# Patient Record
Sex: MALE | Race: WHITE | NOT HISPANIC OR LATINO | Employment: OTHER | URBAN - METROPOLITAN AREA
[De-identification: names, ages, dates, MRNs, and addresses within clinical notes are randomized per-mention and may not be internally consistent; named-entity substitution may affect disease eponyms.]

---

## 2017-01-03 ENCOUNTER — COMMUNICATION - HEALTHEAST (OUTPATIENT)
Dept: FAMILY MEDICINE | Facility: CLINIC | Age: 77
End: 2017-01-03

## 2017-01-11 ENCOUNTER — OFFICE VISIT - HEALTHEAST (OUTPATIENT)
Dept: FAMILY MEDICINE | Facility: CLINIC | Age: 77
End: 2017-01-11

## 2017-01-11 DIAGNOSIS — F02.818 LEWY BODY DEMENTIA WITH BEHAVIORAL DISTURBANCE (H): ICD-10-CM

## 2017-01-11 DIAGNOSIS — I25.10 CORONARY ARTERY DISEASE: ICD-10-CM

## 2017-01-11 DIAGNOSIS — G31.83 LEWY BODY DEMENTIA WITH BEHAVIORAL DISTURBANCE (H): ICD-10-CM

## 2017-01-11 DIAGNOSIS — M51.379 DEGENERATION OF LUMBAR OR LUMBOSACRAL INTERVERTEBRAL DISC: ICD-10-CM

## 2017-01-11 ASSESSMENT — MIFFLIN-ST. JEOR: SCORE: 1725.5

## 2017-02-07 ENCOUNTER — COMMUNICATION - HEALTHEAST (OUTPATIENT)
Dept: SCHEDULING | Facility: CLINIC | Age: 77
End: 2017-02-07

## 2017-02-07 DIAGNOSIS — M51.379 DEGENERATION OF LUMBAR OR LUMBOSACRAL INTERVERTEBRAL DISC: ICD-10-CM

## 2017-02-08 ENCOUNTER — COMMUNICATION - HEALTHEAST (OUTPATIENT)
Dept: FAMILY MEDICINE | Facility: CLINIC | Age: 77
End: 2017-02-08

## 2017-02-08 DIAGNOSIS — E11.9 TYPE 2 DIABETES MELLITUS (H): ICD-10-CM

## 2017-02-15 ENCOUNTER — COMMUNICATION - HEALTHEAST (OUTPATIENT)
Dept: NEUROLOGY | Facility: CLINIC | Age: 77
End: 2017-02-15

## 2017-02-15 DIAGNOSIS — G31.83 LEWY BODY DEMENTIA WITHOUT BEHAVIORAL DISTURBANCE (H): ICD-10-CM

## 2017-02-15 DIAGNOSIS — F02.80 LEWY BODY DEMENTIA WITHOUT BEHAVIORAL DISTURBANCE (H): ICD-10-CM

## 2017-03-06 ENCOUNTER — RECORDS - HEALTHEAST (OUTPATIENT)
Dept: ADMINISTRATIVE | Facility: OTHER | Age: 77
End: 2017-03-06

## 2017-03-07 ENCOUNTER — COMMUNICATION - HEALTHEAST (OUTPATIENT)
Dept: FAMILY MEDICINE | Facility: CLINIC | Age: 77
End: 2017-03-07

## 2017-03-07 DIAGNOSIS — M51.379 DEGENERATION OF LUMBAR OR LUMBOSACRAL INTERVERTEBRAL DISC: ICD-10-CM

## 2017-03-19 ENCOUNTER — COMMUNICATION - HEALTHEAST (OUTPATIENT)
Dept: FAMILY MEDICINE | Facility: CLINIC | Age: 77
End: 2017-03-19

## 2017-03-21 ENCOUNTER — COMMUNICATION - HEALTHEAST (OUTPATIENT)
Dept: FAMILY MEDICINE | Facility: CLINIC | Age: 77
End: 2017-03-21

## 2017-03-21 DIAGNOSIS — E11.9 DM2 (DIABETES MELLITUS, TYPE 2) (H): ICD-10-CM

## 2017-04-15 ENCOUNTER — COMMUNICATION - HEALTHEAST (OUTPATIENT)
Dept: FAMILY MEDICINE | Facility: CLINIC | Age: 77
End: 2017-04-15

## 2017-04-15 DIAGNOSIS — M79.18 MYOFASCIAL MUSCLE PAIN: ICD-10-CM

## 2017-04-15 DIAGNOSIS — M43.10 SPONDYLOLISTHESIS: ICD-10-CM

## 2017-04-15 DIAGNOSIS — M54.50 LUMBAR SPINE PAIN: ICD-10-CM

## 2017-04-15 DIAGNOSIS — R25.2 CRAMP OF BOTH LOWER EXTREMITIES: ICD-10-CM

## 2017-04-15 DIAGNOSIS — M48.061 FORAMINAL STENOSIS OF LUMBAR REGION: ICD-10-CM

## 2017-04-18 ENCOUNTER — COMMUNICATION - HEALTHEAST (OUTPATIENT)
Dept: FAMILY MEDICINE | Facility: CLINIC | Age: 77
End: 2017-04-18

## 2017-04-18 DIAGNOSIS — M51.379 DEGENERATION OF LUMBAR OR LUMBOSACRAL INTERVERTEBRAL DISC: ICD-10-CM

## 2017-05-15 ENCOUNTER — COMMUNICATION - HEALTHEAST (OUTPATIENT)
Dept: FAMILY MEDICINE | Facility: CLINIC | Age: 77
End: 2017-05-15

## 2017-05-15 DIAGNOSIS — E11.9 DM2 (DIABETES MELLITUS, TYPE 2) (H): ICD-10-CM

## 2017-05-20 ENCOUNTER — COMMUNICATION - HEALTHEAST (OUTPATIENT)
Dept: FAMILY MEDICINE | Facility: CLINIC | Age: 77
End: 2017-05-20

## 2017-05-20 DIAGNOSIS — N40.0 BPH (BENIGN PROSTATIC HYPERPLASIA): ICD-10-CM

## 2017-05-23 ENCOUNTER — COMMUNICATION - HEALTHEAST (OUTPATIENT)
Dept: FAMILY MEDICINE | Facility: CLINIC | Age: 77
End: 2017-05-23

## 2017-05-23 DIAGNOSIS — M51.379 DEGENERATION OF LUMBAR OR LUMBOSACRAL INTERVERTEBRAL DISC: ICD-10-CM

## 2017-05-27 ENCOUNTER — COMMUNICATION - HEALTHEAST (OUTPATIENT)
Dept: FAMILY MEDICINE | Facility: CLINIC | Age: 77
End: 2017-05-27

## 2017-05-27 DIAGNOSIS — E11.9 TYPE 2 DIABETES MELLITUS (H): ICD-10-CM

## 2017-05-31 ENCOUNTER — COMMUNICATION - HEALTHEAST (OUTPATIENT)
Dept: CARDIOLOGY | Facility: CLINIC | Age: 77
End: 2017-05-31

## 2017-05-31 DIAGNOSIS — E78.5 HYPERLIPEMIA: ICD-10-CM

## 2017-06-12 ENCOUNTER — COMMUNICATION - HEALTHEAST (OUTPATIENT)
Dept: NEUROLOGY | Facility: CLINIC | Age: 77
End: 2017-06-12

## 2017-06-12 DIAGNOSIS — F32.A DEPRESSION: ICD-10-CM

## 2017-06-19 ENCOUNTER — OFFICE VISIT - HEALTHEAST (OUTPATIENT)
Dept: FAMILY MEDICINE | Facility: CLINIC | Age: 77
End: 2017-06-19

## 2017-06-19 DIAGNOSIS — F03.90 DEMENTIA (H): ICD-10-CM

## 2017-06-19 DIAGNOSIS — F33.9 MAJOR DEPRESSIVE DISORDER, RECURRENT EPISODE (H): ICD-10-CM

## 2017-06-19 DIAGNOSIS — E11.9 TYPE II DIABETES MELLITUS (H): ICD-10-CM

## 2017-06-19 DIAGNOSIS — I25.10 CORONARY ARTERY DISEASE: ICD-10-CM

## 2017-06-19 DIAGNOSIS — M51.379 DEGENERATION OF LUMBAR OR LUMBOSACRAL INTERVERTEBRAL DISC: ICD-10-CM

## 2017-06-19 DIAGNOSIS — G47.00 INSOMNIA: ICD-10-CM

## 2017-06-19 LAB — HBA1C MFR BLD: 7 % (ref 3.5–6)

## 2017-06-19 ASSESSMENT — MIFFLIN-ST. JEOR: SCORE: 1707.36

## 2017-07-07 ENCOUNTER — COMMUNICATION - HEALTHEAST (OUTPATIENT)
Dept: FAMILY MEDICINE | Facility: CLINIC | Age: 77
End: 2017-07-07

## 2017-07-07 DIAGNOSIS — E11.9 TYPE 2 DIABETES MELLITUS (H): ICD-10-CM

## 2017-07-10 ENCOUNTER — COMMUNICATION - HEALTHEAST (OUTPATIENT)
Dept: CARDIOLOGY | Facility: CLINIC | Age: 77
End: 2017-07-10

## 2017-07-10 DIAGNOSIS — Z95.2 HEART VALVE REPLACED: ICD-10-CM

## 2017-07-17 ENCOUNTER — COMMUNICATION - HEALTHEAST (OUTPATIENT)
Dept: CARDIOLOGY | Facility: CLINIC | Age: 77
End: 2017-07-17

## 2017-07-17 DIAGNOSIS — Z95.2 S/P AVR: ICD-10-CM

## 2017-07-20 ENCOUNTER — COMMUNICATION - HEALTHEAST (OUTPATIENT)
Dept: FAMILY MEDICINE | Facility: CLINIC | Age: 77
End: 2017-07-20

## 2017-07-20 DIAGNOSIS — M51.379 DEGENERATION OF LUMBAR OR LUMBOSACRAL INTERVERTEBRAL DISC: ICD-10-CM

## 2017-07-21 ENCOUNTER — COMMUNICATION - HEALTHEAST (OUTPATIENT)
Dept: FAMILY MEDICINE | Facility: CLINIC | Age: 77
End: 2017-07-21

## 2017-07-21 DIAGNOSIS — E11.9 DM2 (DIABETES MELLITUS, TYPE 2) (H): ICD-10-CM

## 2017-08-24 ENCOUNTER — COMMUNICATION - HEALTHEAST (OUTPATIENT)
Dept: FAMILY MEDICINE | Facility: CLINIC | Age: 77
End: 2017-08-24

## 2017-08-24 DIAGNOSIS — E11.9 TYPE 2 DIABETES MELLITUS (H): ICD-10-CM

## 2017-08-28 ENCOUNTER — COMMUNICATION - HEALTHEAST (OUTPATIENT)
Dept: FAMILY MEDICINE | Facility: CLINIC | Age: 77
End: 2017-08-28

## 2017-08-28 ENCOUNTER — COMMUNICATION - HEALTHEAST (OUTPATIENT)
Dept: CARDIOLOGY | Facility: CLINIC | Age: 77
End: 2017-08-28

## 2017-08-28 DIAGNOSIS — M43.10 SPONDYLOLISTHESIS: ICD-10-CM

## 2017-08-28 DIAGNOSIS — M54.50 LUMBAR SPINE PAIN: ICD-10-CM

## 2017-08-28 DIAGNOSIS — E78.5 HYPERLIPEMIA: ICD-10-CM

## 2017-08-28 DIAGNOSIS — R25.2 CRAMP OF BOTH LOWER EXTREMITIES: ICD-10-CM

## 2017-08-28 DIAGNOSIS — M79.18 MYOFASCIAL MUSCLE PAIN: ICD-10-CM

## 2017-08-28 DIAGNOSIS — M48.061 FORAMINAL STENOSIS OF LUMBAR REGION: ICD-10-CM

## 2017-09-13 ENCOUNTER — COMMUNICATION - HEALTHEAST (OUTPATIENT)
Dept: FAMILY MEDICINE | Facility: CLINIC | Age: 77
End: 2017-09-13

## 2017-09-13 DIAGNOSIS — M51.379 DEGENERATION OF LUMBAR OR LUMBOSACRAL INTERVERTEBRAL DISC: ICD-10-CM

## 2017-09-27 ENCOUNTER — OFFICE VISIT - HEALTHEAST (OUTPATIENT)
Dept: CARDIOLOGY | Facility: CLINIC | Age: 77
End: 2017-09-27

## 2017-09-27 DIAGNOSIS — E78.2 MIXED HYPERLIPIDEMIA: ICD-10-CM

## 2017-09-27 DIAGNOSIS — Z95.2 HEART VALVE REPLACED: ICD-10-CM

## 2017-09-27 ASSESSMENT — MIFFLIN-ST. JEOR: SCORE: 1669.93

## 2017-11-01 ENCOUNTER — OFFICE VISIT - HEALTHEAST (OUTPATIENT)
Dept: FAMILY MEDICINE | Facility: CLINIC | Age: 77
End: 2017-11-01

## 2017-11-01 DIAGNOSIS — E11.9 TYPE II DIABETES MELLITUS (H): ICD-10-CM

## 2017-11-01 DIAGNOSIS — Z00.00 ROUTINE GENERAL MEDICAL EXAMINATION AT A HEALTH CARE FACILITY: ICD-10-CM

## 2017-11-01 DIAGNOSIS — L91.8 SKIN TAG: ICD-10-CM

## 2017-11-01 LAB — HBA1C MFR BLD: 6 % (ref 3.5–6)

## 2017-11-01 ASSESSMENT — MIFFLIN-ST. JEOR: SCORE: 1662

## 2017-12-01 ENCOUNTER — OFFICE VISIT - HEALTHEAST (OUTPATIENT)
Dept: FAMILY MEDICINE | Facility: CLINIC | Age: 77
End: 2017-12-01

## 2017-12-01 DIAGNOSIS — J01.90 SINUSITIS, ACUTE: ICD-10-CM

## 2017-12-09 ENCOUNTER — COMMUNICATION - HEALTHEAST (OUTPATIENT)
Dept: NEUROLOGY | Facility: CLINIC | Age: 77
End: 2017-12-09

## 2017-12-09 DIAGNOSIS — G31.83 LEWY BODY DEMENTIA (H): ICD-10-CM

## 2017-12-09 DIAGNOSIS — F02.80 LEWY BODY DEMENTIA (H): ICD-10-CM

## 2017-12-11 ENCOUNTER — COMMUNICATION - HEALTHEAST (OUTPATIENT)
Dept: NEUROLOGY | Facility: CLINIC | Age: 77
End: 2017-12-11

## 2017-12-11 DIAGNOSIS — F02.80 LEWY BODY DEMENTIA (H): ICD-10-CM

## 2017-12-11 DIAGNOSIS — G31.83 LEWY BODY DEMENTIA (H): ICD-10-CM

## 2017-12-19 ENCOUNTER — COMMUNICATION - HEALTHEAST (OUTPATIENT)
Dept: FAMILY MEDICINE | Facility: CLINIC | Age: 77
End: 2017-12-19

## 2017-12-20 ENCOUNTER — COMMUNICATION - HEALTHEAST (OUTPATIENT)
Dept: FAMILY MEDICINE | Facility: CLINIC | Age: 77
End: 2017-12-20

## 2017-12-20 DIAGNOSIS — F33.9 MAJOR DEPRESSIVE DISORDER, RECURRENT EPISODE (H): ICD-10-CM

## 2017-12-27 ENCOUNTER — AMBULATORY - HEALTHEAST (OUTPATIENT)
Dept: FAMILY MEDICINE | Facility: CLINIC | Age: 77
End: 2017-12-27

## 2017-12-27 DIAGNOSIS — F33.1 MODERATE EPISODE OF RECURRENT MAJOR DEPRESSIVE DISORDER (H): ICD-10-CM

## 2018-01-08 ENCOUNTER — COMMUNICATION - HEALTHEAST (OUTPATIENT)
Dept: NEUROLOGY | Facility: CLINIC | Age: 78
End: 2018-01-08

## 2018-01-08 DIAGNOSIS — G31.83 LEWY BODY DEMENTIA WITHOUT BEHAVIORAL DISTURBANCE (H): ICD-10-CM

## 2018-01-08 DIAGNOSIS — F02.80 LEWY BODY DEMENTIA WITHOUT BEHAVIORAL DISTURBANCE (H): ICD-10-CM

## 2018-01-15 ENCOUNTER — COMMUNICATION - HEALTHEAST (OUTPATIENT)
Dept: FAMILY MEDICINE | Facility: CLINIC | Age: 78
End: 2018-01-15

## 2018-01-15 DIAGNOSIS — M43.10 SPONDYLOLISTHESIS: ICD-10-CM

## 2018-01-15 DIAGNOSIS — M48.061 FORAMINAL STENOSIS OF LUMBAR REGION: ICD-10-CM

## 2018-01-15 DIAGNOSIS — E11.9 DM2 (DIABETES MELLITUS, TYPE 2) (H): ICD-10-CM

## 2018-01-15 DIAGNOSIS — M79.18 MYOFASCIAL MUSCLE PAIN: ICD-10-CM

## 2018-01-15 DIAGNOSIS — M54.50 LUMBAR SPINE PAIN: ICD-10-CM

## 2018-01-15 DIAGNOSIS — R25.2 CRAMP OF BOTH LOWER EXTREMITIES: ICD-10-CM

## 2018-01-30 ENCOUNTER — COMMUNICATION - HEALTHEAST (OUTPATIENT)
Dept: FAMILY MEDICINE | Facility: CLINIC | Age: 78
End: 2018-01-30

## 2018-02-14 ENCOUNTER — COMMUNICATION - HEALTHEAST (OUTPATIENT)
Dept: FAMILY MEDICINE | Facility: CLINIC | Age: 78
End: 2018-02-14

## 2018-02-14 DIAGNOSIS — N40.0 BPH (BENIGN PROSTATIC HYPERPLASIA): ICD-10-CM

## 2018-03-07 ENCOUNTER — COMMUNICATION - HEALTHEAST (OUTPATIENT)
Dept: FAMILY MEDICINE | Facility: CLINIC | Age: 78
End: 2018-03-07

## 2018-03-07 DIAGNOSIS — K21.9 ESOPHAGEAL REFLUX: ICD-10-CM

## 2018-03-13 ENCOUNTER — COMMUNICATION - HEALTHEAST (OUTPATIENT)
Dept: FAMILY MEDICINE | Facility: CLINIC | Age: 78
End: 2018-03-13

## 2018-03-21 ENCOUNTER — COMMUNICATION - HEALTHEAST (OUTPATIENT)
Dept: FAMILY MEDICINE | Facility: CLINIC | Age: 78
End: 2018-03-21

## 2018-03-22 ENCOUNTER — OFFICE VISIT - HEALTHEAST (OUTPATIENT)
Dept: FAMILY MEDICINE | Facility: CLINIC | Age: 78
End: 2018-03-22

## 2018-03-22 DIAGNOSIS — J31.0 RHINITIS: ICD-10-CM

## 2018-03-22 DIAGNOSIS — N40.0 BPH (BENIGN PROSTATIC HYPERPLASIA): ICD-10-CM

## 2018-03-22 ASSESSMENT — MIFFLIN-ST. JEOR: SCORE: 1711.9

## 2018-04-05 ENCOUNTER — OFFICE VISIT - HEALTHEAST (OUTPATIENT)
Dept: FAMILY MEDICINE | Facility: CLINIC | Age: 78
End: 2018-04-05

## 2018-04-05 ENCOUNTER — COMMUNICATION - HEALTHEAST (OUTPATIENT)
Dept: FAMILY MEDICINE | Facility: CLINIC | Age: 78
End: 2018-04-05

## 2018-04-05 DIAGNOSIS — R53.83 FATIGUE: ICD-10-CM

## 2018-04-05 DIAGNOSIS — G47.00 INSOMNIA: ICD-10-CM

## 2018-04-05 DIAGNOSIS — N18.30 CHRONIC KIDNEY DISEASE, STAGE III (MODERATE) (H): ICD-10-CM

## 2018-04-05 DIAGNOSIS — R32 URINARY INCONTINENCE: ICD-10-CM

## 2018-04-05 DIAGNOSIS — R41.3 MEMORY LOSS: ICD-10-CM

## 2018-04-05 DIAGNOSIS — E11.9 TYPE II DIABETES MELLITUS (H): ICD-10-CM

## 2018-04-05 LAB
ALBUMIN SERPL-MCNC: 3.5 G/DL (ref 3.5–5)
ALBUMIN UR-MCNC: NEGATIVE MG/DL
ALP SERPL-CCNC: 54 U/L (ref 45–120)
ALT SERPL W P-5'-P-CCNC: 23 U/L (ref 0–45)
ANION GAP SERPL CALCULATED.3IONS-SCNC: 11 MMOL/L (ref 5–18)
APPEARANCE UR: CLEAR
AST SERPL W P-5'-P-CCNC: 24 U/L (ref 0–40)
BACTERIA #/AREA URNS HPF: ABNORMAL HPF
BILIRUB SERPL-MCNC: 0.5 MG/DL (ref 0–1)
BILIRUB UR QL STRIP: NEGATIVE
BUN SERPL-MCNC: 20 MG/DL (ref 8–28)
CALCIUM SERPL-MCNC: 9.8 MG/DL (ref 8.5–10.5)
CHLORIDE BLD-SCNC: 103 MMOL/L (ref 98–107)
CO2 SERPL-SCNC: 25 MMOL/L (ref 22–31)
COLOR UR AUTO: YELLOW
CREAT SERPL-MCNC: 1.35 MG/DL (ref 0.7–1.3)
ERYTHROCYTE [DISTWIDTH] IN BLOOD BY AUTOMATED COUNT: 11.8 % (ref 11–14.5)
GFR SERPL CREATININE-BSD FRML MDRD: 51 ML/MIN/1.73M2
GLUCOSE BLD-MCNC: 136 MG/DL (ref 70–125)
GLUCOSE UR STRIP-MCNC: NEGATIVE MG/DL
HBA1C MFR BLD: 6.8 % (ref 3.5–6)
HCT VFR BLD AUTO: 48.2 % (ref 40–54)
HGB BLD-MCNC: 16.6 G/DL (ref 14–18)
HGB UR QL STRIP: NEGATIVE
KETONES UR STRIP-MCNC: NEGATIVE MG/DL
LEUKOCYTE ESTERASE UR QL STRIP: NEGATIVE
MCH RBC QN AUTO: 32.9 PG (ref 27–34)
MCHC RBC AUTO-ENTMCNC: 34.4 G/DL (ref 32–36)
MCV RBC AUTO: 96 FL (ref 80–100)
NITRATE UR QL: NEGATIVE
PH UR STRIP: 5 [PH] (ref 5–8)
PLATELET # BLD AUTO: 278 THOU/UL (ref 140–440)
PMV BLD AUTO: 7 FL (ref 7–10)
POTASSIUM BLD-SCNC: 5 MMOL/L (ref 3.5–5)
PROT SERPL-MCNC: 7.1 G/DL (ref 6–8)
RBC # BLD AUTO: 5.03 MILL/UL (ref 4.4–6.2)
RBC #/AREA URNS AUTO: ABNORMAL HPF
SODIUM SERPL-SCNC: 139 MMOL/L (ref 136–145)
SP GR UR STRIP: >=1.03 (ref 1–1.03)
SQUAMOUS #/AREA URNS AUTO: ABNORMAL LPF
UROBILINOGEN UR STRIP-ACNC: ABNORMAL
WBC #/AREA URNS AUTO: ABNORMAL HPF
WBC: 4.9 THOU/UL (ref 4–11)

## 2018-04-05 ASSESSMENT — MIFFLIN-ST. JEOR: SCORE: 1697.16

## 2018-04-16 ENCOUNTER — COMMUNICATION - HEALTHEAST (OUTPATIENT)
Dept: FAMILY MEDICINE | Facility: CLINIC | Age: 78
End: 2018-04-16

## 2018-04-16 DIAGNOSIS — F02.80 LEWY BODY DEMENTIA WITHOUT BEHAVIORAL DISTURBANCE (H): ICD-10-CM

## 2018-04-16 DIAGNOSIS — G31.83 LEWY BODY DEMENTIA WITHOUT BEHAVIORAL DISTURBANCE (H): ICD-10-CM

## 2018-05-09 ENCOUNTER — COMMUNICATION - HEALTHEAST (OUTPATIENT)
Dept: FAMILY MEDICINE | Facility: CLINIC | Age: 78
End: 2018-05-09

## 2018-05-09 DIAGNOSIS — M48.061 FORAMINAL STENOSIS OF LUMBAR REGION: ICD-10-CM

## 2018-05-09 DIAGNOSIS — M54.50 LUMBAR SPINE PAIN: ICD-10-CM

## 2018-05-09 DIAGNOSIS — R25.2 CRAMP OF BOTH LOWER EXTREMITIES: ICD-10-CM

## 2018-05-09 DIAGNOSIS — M79.18 MYOFASCIAL MUSCLE PAIN: ICD-10-CM

## 2018-05-09 DIAGNOSIS — M43.10 SPONDYLOLISTHESIS: ICD-10-CM

## 2018-05-22 ENCOUNTER — COMMUNICATION - HEALTHEAST (OUTPATIENT)
Dept: FAMILY MEDICINE | Facility: CLINIC | Age: 78
End: 2018-05-22

## 2018-06-07 ENCOUNTER — COMMUNICATION - HEALTHEAST (OUTPATIENT)
Dept: FAMILY MEDICINE | Facility: CLINIC | Age: 78
End: 2018-06-07

## 2018-06-07 DIAGNOSIS — K21.9 ESOPHAGEAL REFLUX: ICD-10-CM

## 2018-06-11 ENCOUNTER — OFFICE VISIT - HEALTHEAST (OUTPATIENT)
Dept: FAMILY MEDICINE | Facility: CLINIC | Age: 78
End: 2018-06-11

## 2018-06-11 DIAGNOSIS — M51.379 DEGENERATION OF LUMBAR OR LUMBOSACRAL INTERVERTEBRAL DISC: ICD-10-CM

## 2018-06-11 DIAGNOSIS — M54.41 CHRONIC RIGHT-SIDED LOW BACK PAIN WITH RIGHT-SIDED SCIATICA: ICD-10-CM

## 2018-06-11 DIAGNOSIS — G89.29 CHRONIC RIGHT-SIDED LOW BACK PAIN WITH RIGHT-SIDED SCIATICA: ICD-10-CM

## 2018-06-11 ASSESSMENT — MIFFLIN-ST. JEOR: SCORE: 1707.36

## 2018-06-25 ENCOUNTER — RECORDS - HEALTHEAST (OUTPATIENT)
Dept: ADMINISTRATIVE | Facility: OTHER | Age: 78
End: 2018-06-25

## 2018-07-02 ENCOUNTER — COMMUNICATION - HEALTHEAST (OUTPATIENT)
Dept: FAMILY MEDICINE | Facility: CLINIC | Age: 78
End: 2018-07-02

## 2018-07-02 DIAGNOSIS — G47.00 INSOMNIA: ICD-10-CM

## 2018-07-07 ENCOUNTER — COMMUNICATION - HEALTHEAST (OUTPATIENT)
Dept: FAMILY MEDICINE | Facility: CLINIC | Age: 78
End: 2018-07-07

## 2018-07-07 DIAGNOSIS — F33.1 MODERATE EPISODE OF RECURRENT MAJOR DEPRESSIVE DISORDER (H): ICD-10-CM

## 2018-07-11 ENCOUNTER — COMMUNICATION - HEALTHEAST (OUTPATIENT)
Dept: FAMILY MEDICINE | Facility: CLINIC | Age: 78
End: 2018-07-11

## 2018-07-11 ENCOUNTER — COMMUNICATION - HEALTHEAST (OUTPATIENT)
Dept: PHARMACY | Facility: CLINIC | Age: 78
End: 2018-07-11

## 2018-07-16 ENCOUNTER — COMMUNICATION - HEALTHEAST (OUTPATIENT)
Dept: FAMILY MEDICINE | Facility: CLINIC | Age: 78
End: 2018-07-16

## 2018-07-16 ENCOUNTER — OFFICE VISIT - HEALTHEAST (OUTPATIENT)
Dept: FAMILY MEDICINE | Facility: CLINIC | Age: 78
End: 2018-07-16

## 2018-07-16 DIAGNOSIS — M15.0 PRIMARY OSTEOARTHRITIS INVOLVING MULTIPLE JOINTS: ICD-10-CM

## 2018-07-16 DIAGNOSIS — M51.379 DEGENERATION OF LUMBAR OR LUMBOSACRAL INTERVERTEBRAL DISC: ICD-10-CM

## 2018-07-16 DIAGNOSIS — E11.9 DM2 (DIABETES MELLITUS, TYPE 2) (H): ICD-10-CM

## 2018-07-16 LAB
ANION GAP SERPL CALCULATED.3IONS-SCNC: 10 MMOL/L (ref 5–18)
BUN SERPL-MCNC: 32 MG/DL (ref 8–28)
CALCIUM SERPL-MCNC: 10 MG/DL (ref 8.5–10.5)
CHLORIDE BLD-SCNC: 108 MMOL/L (ref 98–107)
CO2 SERPL-SCNC: 24 MMOL/L (ref 22–31)
CREAT SERPL-MCNC: 1.47 MG/DL (ref 0.7–1.3)
GFR SERPL CREATININE-BSD FRML MDRD: 46 ML/MIN/1.73M2
GLUCOSE BLD-MCNC: 99 MG/DL (ref 70–125)
POTASSIUM BLD-SCNC: 4.7 MMOL/L (ref 3.5–5)
SODIUM SERPL-SCNC: 142 MMOL/L (ref 136–145)

## 2018-07-16 ASSESSMENT — MIFFLIN-ST. JEOR: SCORE: 1716.44

## 2018-07-17 ENCOUNTER — AMBULATORY - HEALTHEAST (OUTPATIENT)
Dept: FAMILY MEDICINE | Facility: CLINIC | Age: 78
End: 2018-07-17

## 2018-07-17 ENCOUNTER — COMMUNICATION - HEALTHEAST (OUTPATIENT)
Dept: FAMILY MEDICINE | Facility: CLINIC | Age: 78
End: 2018-07-17

## 2018-08-09 ENCOUNTER — COMMUNICATION - HEALTHEAST (OUTPATIENT)
Dept: FAMILY MEDICINE | Facility: CLINIC | Age: 78
End: 2018-08-09

## 2018-08-19 ENCOUNTER — COMMUNICATION - HEALTHEAST (OUTPATIENT)
Dept: FAMILY MEDICINE | Facility: CLINIC | Age: 78
End: 2018-08-19

## 2018-09-06 ENCOUNTER — COMMUNICATION - HEALTHEAST (OUTPATIENT)
Dept: FAMILY MEDICINE | Facility: CLINIC | Age: 78
End: 2018-09-06

## 2018-09-06 DIAGNOSIS — E11.9 TYPE 2 DIABETES MELLITUS (H): ICD-10-CM

## 2018-09-11 ENCOUNTER — COMMUNICATION - HEALTHEAST (OUTPATIENT)
Dept: FAMILY MEDICINE | Facility: CLINIC | Age: 78
End: 2018-09-11

## 2018-09-18 ENCOUNTER — COMMUNICATION - HEALTHEAST (OUTPATIENT)
Dept: FAMILY MEDICINE | Facility: CLINIC | Age: 78
End: 2018-09-18

## 2018-09-18 DIAGNOSIS — M43.10 SPONDYLOLISTHESIS: ICD-10-CM

## 2018-09-18 DIAGNOSIS — M54.50 LUMBAR SPINE PAIN: ICD-10-CM

## 2018-09-18 DIAGNOSIS — M48.061 FORAMINAL STENOSIS OF LUMBAR REGION: ICD-10-CM

## 2018-09-18 DIAGNOSIS — M79.18 MYOFASCIAL MUSCLE PAIN: ICD-10-CM

## 2018-09-18 DIAGNOSIS — R25.2 CRAMP OF BOTH LOWER EXTREMITIES: ICD-10-CM

## 2018-09-29 ENCOUNTER — COMMUNICATION - HEALTHEAST (OUTPATIENT)
Dept: CARDIOLOGY | Facility: CLINIC | Age: 78
End: 2018-09-29

## 2018-09-29 DIAGNOSIS — Z95.2 HEART VALVE REPLACED: ICD-10-CM

## 2018-10-01 ENCOUNTER — OFFICE VISIT - HEALTHEAST (OUTPATIENT)
Dept: CARDIOLOGY | Facility: CLINIC | Age: 78
End: 2018-10-01

## 2018-10-01 DIAGNOSIS — Z95.2 S/P AVR: ICD-10-CM

## 2018-10-03 ENCOUNTER — COMMUNICATION - HEALTHEAST (OUTPATIENT)
Dept: FAMILY MEDICINE | Facility: CLINIC | Age: 78
End: 2018-10-03

## 2018-10-03 DIAGNOSIS — R25.2 CRAMP OF BOTH LOWER EXTREMITIES: ICD-10-CM

## 2018-10-03 DIAGNOSIS — M43.10 SPONDYLOLISTHESIS: ICD-10-CM

## 2018-10-03 DIAGNOSIS — M54.50 LUMBAR SPINE PAIN: ICD-10-CM

## 2018-10-03 DIAGNOSIS — M79.18 MYOFASCIAL MUSCLE PAIN: ICD-10-CM

## 2018-10-03 DIAGNOSIS — M48.061 FORAMINAL STENOSIS OF LUMBAR REGION: ICD-10-CM

## 2018-10-08 ENCOUNTER — COMMUNICATION - HEALTHEAST (OUTPATIENT)
Dept: FAMILY MEDICINE | Facility: CLINIC | Age: 78
End: 2018-10-08

## 2018-10-12 ENCOUNTER — HOSPITAL ENCOUNTER (OUTPATIENT)
Dept: CARDIOLOGY | Facility: HOSPITAL | Age: 78
Discharge: HOME | End: 2018-10-12
Attending: INTERNAL MEDICINE

## 2018-10-12 LAB
AORTIC ROOT: 3.4 CM
AORTIC VALVE MEAN VELOCITY: 126 CM/S
AV DIMENSIONLESS INDEX VTI: 0.7
AV MEAN GRADIENT: 8 MMHG
AV PEAK GRADIENT: 15.4 MMHG
AV VALVE AREA: 2.1 CM2
BSA FOR ECHO PROCEDURE: 2.22 M2
CV BLOOD PRESSURE: NORMAL MMHG
CV ECHO HEIGHT: 71 IN
CV ECHO WEIGHT: 218 LBS
DOP CALC AO PEAK VEL: 196 CM/S
DOP CALC AO VTI: 32.2 CM
DOP CALC LVOT AREA: 3.14 CM2
DOP CALC LVOT DIAMETER: 2 CM
DOP CALC LVOT STROKE VOLUME: 67.8 CM3
DOP CALC MV VTI: 28.8 CM
DOP CALCLVOT PEAK VEL VTI: 21.6 CM
EJECTION FRACTION: 64 % (ref 55–75)
FRACTIONAL SHORTENING: 48.8 % (ref 28–44)
INTERVENTRICULAR SEPTUM IN END DIASTOLE: 1.7 CM (ref 0.6–1)
IVS/PW RATIO: 1.3
LA AREA 1: 18.5 CM2
LA AREA 2: 12.6 CM2
LEFT ATRIUM LENGTH: 4.19 CM
LEFT ATRIUM SIZE: 3.6 CM
LEFT ATRIUM VOLUME INDEX: 21.3 ML/M2
LEFT ATRIUM VOLUME: 47.3 ML
LEFT VENTRICLE CARDIAC INDEX: 2 L/MIN/M2
LEFT VENTRICLE CARDIAC OUTPUT: 4.5 L/MIN
LEFT VENTRICLE DIASTOLIC VOLUME INDEX: 48.6 CM3/M2 (ref 34–74)
LEFT VENTRICLE DIASTOLIC VOLUME: 108 CM3 (ref 62–150)
LEFT VENTRICLE HEART RATE: 66 BPM
LEFT VENTRICLE MASS INDEX: 108.6 G/M2
LEFT VENTRICLE SYSTOLIC VOLUME INDEX: 17.6 CM3/M2 (ref 11–31)
LEFT VENTRICLE SYSTOLIC VOLUME: 39 CM3 (ref 21–61)
LEFT VENTRICULAR INTERNAL DIMENSION IN DIASTOLE: 4.1 CM (ref 4.2–5.8)
LEFT VENTRICULAR INTERNAL DIMENSION IN SYSTOLE: 2.1 CM (ref 2.5–4)
LEFT VENTRICULAR MASS: 241 G
LEFT VENTRICULAR OUTFLOW TRACT MEAN GRADIENT: 2 MMHG
LEFT VENTRICULAR OUTFLOW TRACT MEAN VELOCITY: 67.4 CM/S
LEFT VENTRICULAR POSTERIOR WALL IN END DIASTOLE: 1.3 CM (ref 0.6–1)
LV STROKE VOLUME INDEX: 30.6 ML/M2
MITRAL VALVE DECELERATION SLOPE: 4190 MM/S2
MITRAL VALVE E/A RATIO: 0.7
MITRAL VALVE MEAN INFLOW VELOCITY: 76.3 CM/S
MITRAL VALVE PEAK VELOCITY: 117 CM/S
MITRAL VALVE PRESSURE HALF-TIME: 72 MS
MV AREA VTI: 2.36 CM2
MV AVERAGE E/E' RATIO: 12 CM/S
MV DECELERATION TIME: 278 MS
MV E'TISSUE VEL-LAT: 8.58 CM/S
MV E'TISSUE VEL-MED: 5.56 CM/S
MV LATERAL E/E' RATIO: 9.9
MV MEAN GRADIENT: 3 MMHG
MV MEDIAL E/E' RATIO: 15.3
MV PEAK A VELOCITY: 122 CM/S
MV PEAK E VELOCITY: 85 CM/S
MV PEAK GRADIENT: 5.5 MMHG
MV VALVE AREA BY CONTINUITY EQUATION: 2.4 CM2
MV VALVE AREA PRESSURE 1/2 METHOD: 3.1 CM2
NUC REST DIASTOLIC VOLUME INDEX: 3488 LBS
NUC REST SYSTOLIC VOLUME INDEX: 71 IN
PR MAX PG: 2 MMHG
PR PEAK VELOCITY: 70.4 CM/S

## 2018-10-12 ASSESSMENT — MIFFLIN-ST. JEOR: SCORE: 1720.97

## 2018-10-16 ENCOUNTER — OFFICE VISIT - HEALTHEAST (OUTPATIENT)
Dept: FAMILY MEDICINE | Facility: CLINIC | Age: 78
End: 2018-10-16

## 2018-10-16 DIAGNOSIS — N40.1 BENIGN PROSTATIC HYPERPLASIA WITH POST-VOID DRIBBLING: ICD-10-CM

## 2018-10-16 DIAGNOSIS — F03.90 DEMENTIA (H): ICD-10-CM

## 2018-10-16 DIAGNOSIS — N39.43 BENIGN PROSTATIC HYPERPLASIA WITH POST-VOID DRIBBLING: ICD-10-CM

## 2018-10-16 LAB
ALBUMIN UR-MCNC: NEGATIVE MG/DL
APPEARANCE UR: CLEAR
BACTERIA #/AREA URNS HPF: ABNORMAL HPF
BILIRUB UR QL STRIP: NEGATIVE
CAOX CRY #/AREA URNS HPF: PRESENT /[HPF]
COLOR UR AUTO: YELLOW
GLUCOSE UR STRIP-MCNC: NEGATIVE MG/DL
HGB UR QL STRIP: NEGATIVE
KETONES UR STRIP-MCNC: ABNORMAL MG/DL
LEUKOCYTE ESTERASE UR QL STRIP: ABNORMAL
NITRATE UR QL: NEGATIVE
PH UR STRIP: 5 [PH] (ref 5–8)
RBC #/AREA URNS AUTO: ABNORMAL HPF
SP GR UR STRIP: >=1.03 (ref 1–1.03)
SPERM #/AREA URNS HPF: PRESENT /[HPF]
SQUAMOUS #/AREA URNS AUTO: ABNORMAL LPF
UROBILINOGEN UR STRIP-ACNC: ABNORMAL
WBC #/AREA URNS AUTO: ABNORMAL HPF

## 2018-10-16 ASSESSMENT — MIFFLIN-ST. JEOR: SCORE: 1716.44

## 2018-10-17 LAB — BACTERIA SPEC CULT: NO GROWTH

## 2018-10-27 ENCOUNTER — COMMUNICATION - HEALTHEAST (OUTPATIENT)
Dept: FAMILY MEDICINE | Facility: CLINIC | Age: 78
End: 2018-10-27

## 2018-10-27 DIAGNOSIS — G31.83 LEWY BODY DEMENTIA WITHOUT BEHAVIORAL DISTURBANCE (H): ICD-10-CM

## 2018-10-27 DIAGNOSIS — F02.80 LEWY BODY DEMENTIA WITHOUT BEHAVIORAL DISTURBANCE (H): ICD-10-CM

## 2018-11-09 ENCOUNTER — COMMUNICATION - HEALTHEAST (OUTPATIENT)
Dept: FAMILY MEDICINE | Facility: CLINIC | Age: 78
End: 2018-11-09

## 2018-11-10 ENCOUNTER — COMMUNICATION - HEALTHEAST (OUTPATIENT)
Dept: NEUROLOGY | Facility: CLINIC | Age: 78
End: 2018-11-10

## 2018-11-10 ENCOUNTER — COMMUNICATION - HEALTHEAST (OUTPATIENT)
Dept: FAMILY MEDICINE | Facility: CLINIC | Age: 78
End: 2018-11-10

## 2018-11-10 DIAGNOSIS — F02.80 LEWY BODY DEMENTIA (H): ICD-10-CM

## 2018-11-10 DIAGNOSIS — G31.83 LEWY BODY DEMENTIA (H): ICD-10-CM

## 2018-12-10 ENCOUNTER — COMMUNICATION - HEALTHEAST (OUTPATIENT)
Dept: FAMILY MEDICINE | Facility: CLINIC | Age: 78
End: 2018-12-10

## 2019-01-09 ENCOUNTER — COMMUNICATION - HEALTHEAST (OUTPATIENT)
Dept: NEUROLOGY | Facility: CLINIC | Age: 79
End: 2019-01-09

## 2019-01-09 DIAGNOSIS — G31.83 LEWY BODY DEMENTIA (H): ICD-10-CM

## 2019-01-09 DIAGNOSIS — F02.80 LEWY BODY DEMENTIA (H): ICD-10-CM

## 2019-01-16 ENCOUNTER — COMMUNICATION - HEALTHEAST (OUTPATIENT)
Dept: FAMILY MEDICINE | Facility: CLINIC | Age: 79
End: 2019-01-16

## 2019-01-16 DIAGNOSIS — R25.2 CRAMP OF BOTH LOWER EXTREMITIES: ICD-10-CM

## 2019-01-16 DIAGNOSIS — M54.50 LUMBAR SPINE PAIN: ICD-10-CM

## 2019-01-16 DIAGNOSIS — M48.061 FORAMINAL STENOSIS OF LUMBAR REGION: ICD-10-CM

## 2019-01-16 DIAGNOSIS — M79.18 MYOFASCIAL MUSCLE PAIN: ICD-10-CM

## 2019-01-16 DIAGNOSIS — M43.10 SPONDYLOLISTHESIS: ICD-10-CM

## 2019-01-18 ENCOUNTER — COMMUNICATION - HEALTHEAST (OUTPATIENT)
Dept: CARDIOLOGY | Facility: CLINIC | Age: 79
End: 2019-01-18

## 2019-01-18 DIAGNOSIS — E78.2 MIXED HYPERLIPIDEMIA: ICD-10-CM

## 2019-01-20 ENCOUNTER — COMMUNICATION - HEALTHEAST (OUTPATIENT)
Dept: FAMILY MEDICINE | Facility: CLINIC | Age: 79
End: 2019-01-20

## 2019-01-20 DIAGNOSIS — G31.83 LEWY BODY DEMENTIA (H): ICD-10-CM

## 2019-01-20 DIAGNOSIS — F02.80 LEWY BODY DEMENTIA (H): ICD-10-CM

## 2019-02-13 ENCOUNTER — OFFICE VISIT - HEALTHEAST (OUTPATIENT)
Dept: FAMILY MEDICINE | Facility: CLINIC | Age: 79
End: 2019-02-13

## 2019-02-13 ENCOUNTER — HOSPITAL ENCOUNTER (OUTPATIENT)
Dept: LAB | Age: 79
Setting detail: SPECIMEN
Discharge: HOME | End: 2019-02-13

## 2019-02-13 DIAGNOSIS — R19.7 DIARRHEA, UNSPECIFIED TYPE: ICD-10-CM

## 2019-02-13 DIAGNOSIS — Z79.4 TYPE 2 DIABETES MELLITUS WITH STAGE 3 CHRONIC KIDNEY DISEASE, WITH LONG-TERM CURRENT USE OF INSULIN (H): ICD-10-CM

## 2019-02-13 DIAGNOSIS — N18.30 TYPE 2 DIABETES MELLITUS WITH STAGE 3 CHRONIC KIDNEY DISEASE, WITH LONG-TERM CURRENT USE OF INSULIN (H): ICD-10-CM

## 2019-02-13 DIAGNOSIS — E11.22 TYPE 2 DIABETES MELLITUS WITH STAGE 3 CHRONIC KIDNEY DISEASE, WITH LONG-TERM CURRENT USE OF INSULIN (H): ICD-10-CM

## 2019-02-13 DIAGNOSIS — N18.30 CHRONIC KIDNEY DISEASE, STAGE III (MODERATE) (H): ICD-10-CM

## 2019-02-13 LAB
ALBUMIN SERPL-MCNC: 4.1 G/DL (ref 3.5–5)
ALP SERPL-CCNC: 42 U/L (ref 45–120)
ALT SERPL W P-5'-P-CCNC: 19 U/L (ref 0–45)
ANION GAP SERPL CALCULATED.3IONS-SCNC: 9 MMOL/L (ref 5–18)
AST SERPL W P-5'-P-CCNC: 27 U/L (ref 0–40)
BILIRUB SERPL-MCNC: 0.8 MG/DL (ref 0–1)
BUN SERPL-MCNC: 18 MG/DL (ref 8–28)
CALCIUM SERPL-MCNC: 10.4 MG/DL (ref 8.5–10.5)
CHLORIDE BLD-SCNC: 103 MMOL/L (ref 98–107)
CO2 SERPL-SCNC: 27 MMOL/L (ref 22–31)
CREAT SERPL-MCNC: 1.54 MG/DL (ref 0.7–1.3)
GFR SERPL CREATININE-BSD FRML MDRD: 44 ML/MIN/1.73M2
GLUCOSE BLD-MCNC: 104 MG/DL (ref 70–125)
HBA1C MFR BLD: 6.6 % (ref 3.5–6)
POTASSIUM BLD-SCNC: 4.9 MMOL/L (ref 3.5–5)
PROT SERPL-MCNC: 7.4 G/DL (ref 6–8)
SODIUM SERPL-SCNC: 139 MMOL/L (ref 136–145)

## 2019-02-13 ASSESSMENT — MIFFLIN-ST. JEOR: SCORE: 1720.97

## 2019-02-14 ENCOUNTER — AMBULATORY - HEALTHEAST (OUTPATIENT)
Dept: FAMILY MEDICINE | Facility: CLINIC | Age: 79
End: 2019-02-14

## 2019-03-11 ENCOUNTER — COMMUNICATION - HEALTHEAST (OUTPATIENT)
Dept: FAMILY MEDICINE | Facility: CLINIC | Age: 79
End: 2019-03-11

## 2019-03-11 DIAGNOSIS — K21.9 ESOPHAGEAL REFLUX: ICD-10-CM

## 2019-03-20 ENCOUNTER — COMMUNICATION - HEALTHEAST (OUTPATIENT)
Dept: FAMILY MEDICINE | Facility: CLINIC | Age: 79
End: 2019-03-20

## 2019-03-20 DIAGNOSIS — G31.83 LEWY BODY DEMENTIA (H): ICD-10-CM

## 2019-03-20 DIAGNOSIS — F02.80 LEWY BODY DEMENTIA (H): ICD-10-CM

## 2019-04-18 ENCOUNTER — COMMUNICATION - HEALTHEAST (OUTPATIENT)
Dept: FAMILY MEDICINE | Facility: CLINIC | Age: 79
End: 2019-04-18

## 2019-04-18 DIAGNOSIS — E11.9 DM2 (DIABETES MELLITUS, TYPE 2) (H): ICD-10-CM

## 2019-05-10 ENCOUNTER — COMMUNICATION - HEALTHEAST (OUTPATIENT)
Dept: FAMILY MEDICINE | Facility: CLINIC | Age: 79
End: 2019-05-10

## 2019-05-10 DIAGNOSIS — N40.0 BPH (BENIGN PROSTATIC HYPERPLASIA): ICD-10-CM

## 2019-05-21 ENCOUNTER — AMBULATORY - HEALTHEAST (OUTPATIENT)
Dept: FAMILY MEDICINE | Facility: CLINIC | Age: 79
End: 2019-05-21

## 2019-05-21 DIAGNOSIS — G47.00 INSOMNIA: ICD-10-CM

## 2019-06-05 ENCOUNTER — COMMUNICATION - HEALTHEAST (OUTPATIENT)
Dept: FAMILY MEDICINE | Facility: CLINIC | Age: 79
End: 2019-06-05

## 2019-06-05 DIAGNOSIS — G47.00 INSOMNIA: ICD-10-CM

## 2019-06-29 ENCOUNTER — COMMUNICATION - HEALTHEAST (OUTPATIENT)
Dept: FAMILY MEDICINE | Facility: CLINIC | Age: 79
End: 2019-06-29

## 2019-06-29 DIAGNOSIS — F33.1 MODERATE EPISODE OF RECURRENT MAJOR DEPRESSIVE DISORDER (H): ICD-10-CM

## 2019-07-03 ENCOUNTER — COMMUNICATION - HEALTHEAST (OUTPATIENT)
Dept: FAMILY MEDICINE | Facility: CLINIC | Age: 79
End: 2019-07-03

## 2019-09-17 ENCOUNTER — COMMUNICATION - HEALTHEAST (OUTPATIENT)
Dept: PHARMACY | Facility: CLINIC | Age: 79
End: 2019-09-17

## 2019-09-23 ENCOUNTER — COMMUNICATION - HEALTHEAST (OUTPATIENT)
Dept: FAMILY MEDICINE | Facility: CLINIC | Age: 79
End: 2019-09-23

## 2019-09-23 DIAGNOSIS — E11.9 TYPE 2 DIABETES MELLITUS (H): ICD-10-CM

## 2019-09-24 ENCOUNTER — COMMUNICATION - HEALTHEAST (OUTPATIENT)
Dept: FAMILY MEDICINE | Facility: CLINIC | Age: 79
End: 2019-09-24

## 2019-09-24 DIAGNOSIS — G31.83 LEWY BODY DEMENTIA (H): ICD-10-CM

## 2019-09-24 DIAGNOSIS — F02.80 LEWY BODY DEMENTIA (H): ICD-10-CM

## 2019-10-04 ENCOUNTER — COMMUNICATION - HEALTHEAST (OUTPATIENT)
Dept: CARDIOLOGY | Facility: CLINIC | Age: 79
End: 2019-10-04

## 2019-10-04 DIAGNOSIS — Z95.2 HEART VALVE REPLACED: ICD-10-CM

## 2019-10-17 ENCOUNTER — COMMUNICATION - HEALTHEAST (OUTPATIENT)
Dept: CARDIOLOGY | Facility: CLINIC | Age: 79
End: 2019-10-17

## 2019-10-17 ENCOUNTER — COMMUNICATION - HEALTHEAST (OUTPATIENT)
Dept: FAMILY MEDICINE | Facility: CLINIC | Age: 79
End: 2019-10-17

## 2019-10-17 DIAGNOSIS — E78.2 MIXED HYPERLIPIDEMIA: ICD-10-CM

## 2019-10-17 DIAGNOSIS — N40.1 BENIGN PROSTATIC HYPERPLASIA WITH POST-VOID DRIBBLING: ICD-10-CM

## 2019-10-17 DIAGNOSIS — N39.43 BENIGN PROSTATIC HYPERPLASIA WITH POST-VOID DRIBBLING: ICD-10-CM

## 2019-10-21 ENCOUNTER — AMBULATORY - HEALTHEAST (OUTPATIENT)
Dept: PHARMACY | Facility: CLINIC | Age: 79
End: 2019-10-21

## 2019-10-21 DIAGNOSIS — N40.1 BENIGN PROSTATIC HYPERPLASIA WITH POST-VOID DRIBBLING: ICD-10-CM

## 2019-10-21 DIAGNOSIS — I10 ESSENTIAL HYPERTENSION, BENIGN: ICD-10-CM

## 2019-10-21 DIAGNOSIS — E11.22 TYPE 2 DIABETES MELLITUS WITH STAGE 3 CHRONIC KIDNEY DISEASE, WITH LONG-TERM CURRENT USE OF INSULIN (H): ICD-10-CM

## 2019-10-21 DIAGNOSIS — F33.9 EPISODE OF RECURRENT MAJOR DEPRESSIVE DISORDER, UNSPECIFIED DEPRESSION EPISODE SEVERITY (H): ICD-10-CM

## 2019-10-21 DIAGNOSIS — N18.30 TYPE 2 DIABETES MELLITUS WITH STAGE 3 CHRONIC KIDNEY DISEASE, WITH LONG-TERM CURRENT USE OF INSULIN (H): ICD-10-CM

## 2019-10-21 DIAGNOSIS — J30.1 SEASONAL ALLERGIC RHINITIS DUE TO POLLEN: ICD-10-CM

## 2019-10-21 DIAGNOSIS — E78.49 OTHER HYPERLIPIDEMIA: ICD-10-CM

## 2019-10-21 DIAGNOSIS — Z79.4 TYPE 2 DIABETES MELLITUS WITH STAGE 3 CHRONIC KIDNEY DISEASE, WITH LONG-TERM CURRENT USE OF INSULIN (H): ICD-10-CM

## 2019-10-21 DIAGNOSIS — K59.00 CONSTIPATION, UNSPECIFIED CONSTIPATION TYPE: ICD-10-CM

## 2019-10-21 DIAGNOSIS — F03.90 DEMENTIA WITHOUT BEHAVIORAL DISTURBANCE, UNSPECIFIED DEMENTIA TYPE: ICD-10-CM

## 2019-10-21 DIAGNOSIS — Z71.89 ENCOUNTER FOR HERB AND VITAMIN SUPPLEMENT MANAGEMENT: ICD-10-CM

## 2019-10-21 DIAGNOSIS — M15.0 PRIMARY OSTEOARTHRITIS INVOLVING MULTIPLE JOINTS: ICD-10-CM

## 2019-10-21 DIAGNOSIS — I35.9 AORTIC VALVE DISORDER: ICD-10-CM

## 2019-10-21 DIAGNOSIS — G47.00 INSOMNIA, UNSPECIFIED TYPE: ICD-10-CM

## 2019-10-21 DIAGNOSIS — K21.9 GASTROESOPHAGEAL REFLUX DISEASE, ESOPHAGITIS PRESENCE NOT SPECIFIED: ICD-10-CM

## 2019-10-21 DIAGNOSIS — N39.43 BENIGN PROSTATIC HYPERPLASIA WITH POST-VOID DRIBBLING: ICD-10-CM

## 2019-10-29 ENCOUNTER — COMMUNICATION - HEALTHEAST (OUTPATIENT)
Dept: FAMILY MEDICINE | Facility: CLINIC | Age: 79
End: 2019-10-29

## 2019-10-29 DIAGNOSIS — E11.9 TYPE 2 DIABETES MELLITUS WITHOUT COMPLICATION, WITHOUT LONG-TERM CURRENT USE OF INSULIN (H): ICD-10-CM

## 2019-11-01 ENCOUNTER — COMMUNICATION - HEALTHEAST (OUTPATIENT)
Dept: FAMILY MEDICINE | Facility: CLINIC | Age: 79
End: 2019-11-01

## 2019-11-01 DIAGNOSIS — F02.80 LEWY BODY DEMENTIA WITHOUT BEHAVIORAL DISTURBANCE (H): ICD-10-CM

## 2019-11-01 DIAGNOSIS — G31.83 LEWY BODY DEMENTIA WITHOUT BEHAVIORAL DISTURBANCE (H): ICD-10-CM

## 2019-11-04 ENCOUNTER — OFFICE VISIT - HEALTHEAST (OUTPATIENT)
Dept: FAMILY MEDICINE | Facility: CLINIC | Age: 79
End: 2019-11-04

## 2019-11-04 DIAGNOSIS — F33.0 MILD EPISODE OF RECURRENT MAJOR DEPRESSIVE DISORDER (H): ICD-10-CM

## 2019-11-04 DIAGNOSIS — I73.9 PERIPHERAL VASCULAR DISEASE, UNSPECIFIED (H): ICD-10-CM

## 2019-11-04 DIAGNOSIS — Z00.00 ROUTINE GENERAL MEDICAL EXAMINATION AT A HEALTH CARE FACILITY: ICD-10-CM

## 2019-11-04 DIAGNOSIS — E11.9 DIABETES MELLITUS, TYPE 2 (H): ICD-10-CM

## 2019-11-04 LAB
CREAT UR-MCNC: 236.5 MG/DL
HBA1C MFR BLD: 6.7 % (ref 3.5–6)
MICROALBUMIN UR-MCNC: 5.13 MG/DL (ref 0–1.99)
MICROALBUMIN/CREAT UR: 21.7 MG/G

## 2019-11-04 ASSESSMENT — ANXIETY QUESTIONNAIRES
1. FEELING NERVOUS, ANXIOUS, OR ON EDGE: SEVERAL DAYS
2. NOT BEING ABLE TO STOP OR CONTROL WORRYING: SEVERAL DAYS

## 2019-11-04 ASSESSMENT — PATIENT HEALTH QUESTIONNAIRE - PHQ9: SUM OF ALL RESPONSES TO PHQ QUESTIONS 1-9: 5

## 2019-11-04 ASSESSMENT — MIFFLIN-ST. JEOR: SCORE: 1660.3

## 2019-12-22 ENCOUNTER — COMMUNICATION - HEALTHEAST (OUTPATIENT)
Dept: FAMILY MEDICINE | Facility: CLINIC | Age: 79
End: 2019-12-22

## 2019-12-22 DIAGNOSIS — E11.9 TYPE 2 DIABETES MELLITUS (H): ICD-10-CM

## 2019-12-24 RX ORDER — BLOOD SUGAR DIAGNOSTIC
STRIP MISCELLANEOUS
Qty: 100 STRIP | Refills: 3 | Status: SHIPPED | OUTPATIENT
Start: 2019-12-24 | End: 2022-08-17

## 2020-01-06 ENCOUNTER — COMMUNICATION - HEALTHEAST (OUTPATIENT)
Dept: CARDIOLOGY | Facility: CLINIC | Age: 80
End: 2020-01-06

## 2020-01-06 DIAGNOSIS — Z95.2 HEART VALVE REPLACED: ICD-10-CM

## 2020-01-14 ENCOUNTER — OFFICE VISIT - HEALTHEAST (OUTPATIENT)
Dept: CARDIOLOGY | Facility: CLINIC | Age: 80
End: 2020-01-14

## 2020-01-14 ENCOUNTER — COMMUNICATION - HEALTHEAST (OUTPATIENT)
Dept: CARDIOLOGY | Facility: CLINIC | Age: 80
End: 2020-01-14

## 2020-01-14 DIAGNOSIS — E78.5 DYSLIPIDEMIA: ICD-10-CM

## 2020-01-14 DIAGNOSIS — E78.2 MIXED HYPERLIPIDEMIA: ICD-10-CM

## 2020-01-14 DIAGNOSIS — N18.30 CHRONIC KIDNEY DISEASE, STAGE III (MODERATE) (H): ICD-10-CM

## 2020-01-14 LAB
ALBUMIN SERPL-MCNC: 3.9 G/DL (ref 3.5–5)
ALP SERPL-CCNC: 52 U/L (ref 45–120)
ALT SERPL W P-5'-P-CCNC: 18 U/L (ref 0–45)
ANION GAP SERPL CALCULATED.3IONS-SCNC: 10 MMOL/L (ref 5–18)
AST SERPL W P-5'-P-CCNC: 21 U/L (ref 0–40)
BILIRUB SERPL-MCNC: 0.5 MG/DL (ref 0–1)
BUN SERPL-MCNC: 16 MG/DL (ref 8–28)
CALCIUM SERPL-MCNC: 10.1 MG/DL (ref 8.5–10.5)
CHLORIDE BLD-SCNC: 106 MMOL/L (ref 98–107)
CO2 SERPL-SCNC: 23 MMOL/L (ref 22–31)
CREAT SERPL-MCNC: 1.19 MG/DL (ref 0.7–1.3)
GFR SERPL CREATININE-BSD FRML MDRD: 59 ML/MIN/1.73M2
GLUCOSE BLD-MCNC: 151 MG/DL (ref 70–125)
LDLC SERPL CALC-MCNC: 126 MG/DL
POTASSIUM BLD-SCNC: 4.4 MMOL/L (ref 3.5–5)
PROT SERPL-MCNC: 7.2 G/DL (ref 6–8)
SODIUM SERPL-SCNC: 139 MMOL/L (ref 136–145)

## 2020-01-14 ASSESSMENT — MIFFLIN-ST. JEOR: SCORE: 1662

## 2020-01-16 ENCOUNTER — AMBULATORY - HEALTHEAST (OUTPATIENT)
Dept: CARDIOLOGY | Facility: CLINIC | Age: 80
End: 2020-01-16

## 2020-01-16 DIAGNOSIS — E78.49 OTHER HYPERLIPIDEMIA: ICD-10-CM

## 2020-01-16 DIAGNOSIS — E78.5 DYSLIPIDEMIA: ICD-10-CM

## 2020-01-17 ENCOUNTER — COMMUNICATION - HEALTHEAST (OUTPATIENT)
Dept: FAMILY MEDICINE | Facility: CLINIC | Age: 80
End: 2020-01-17

## 2020-01-17 DIAGNOSIS — E11.9 DM2 (DIABETES MELLITUS, TYPE 2) (H): ICD-10-CM

## 2020-01-24 ENCOUNTER — AMBULATORY - HEALTHEAST (OUTPATIENT)
Dept: CARDIOLOGY | Facility: CLINIC | Age: 80
End: 2020-01-24

## 2020-01-24 DIAGNOSIS — E78.49 OTHER HYPERLIPIDEMIA: ICD-10-CM

## 2020-03-31 ENCOUNTER — COMMUNICATION - HEALTHEAST (OUTPATIENT)
Dept: CARDIOLOGY | Facility: CLINIC | Age: 80
End: 2020-03-31

## 2020-03-31 DIAGNOSIS — Z95.2 HEART VALVE REPLACED: ICD-10-CM

## 2020-04-01 ENCOUNTER — COMMUNICATION - HEALTHEAST (OUTPATIENT)
Dept: FAMILY MEDICINE | Facility: CLINIC | Age: 80
End: 2020-04-01

## 2020-04-01 DIAGNOSIS — F33.1 MODERATE EPISODE OF RECURRENT MAJOR DEPRESSIVE DISORDER (H): ICD-10-CM

## 2020-04-09 ENCOUNTER — COMMUNICATION - HEALTHEAST (OUTPATIENT)
Dept: CARDIOLOGY | Facility: CLINIC | Age: 80
End: 2020-04-09

## 2020-04-09 DIAGNOSIS — E78.2 MIXED HYPERLIPIDEMIA: ICD-10-CM

## 2020-04-16 ENCOUNTER — COMMUNICATION - HEALTHEAST (OUTPATIENT)
Dept: FAMILY MEDICINE | Facility: CLINIC | Age: 80
End: 2020-04-16

## 2020-04-16 DIAGNOSIS — K21.9 ESOPHAGEAL REFLUX: ICD-10-CM

## 2020-04-20 ENCOUNTER — COMMUNICATION - HEALTHEAST (OUTPATIENT)
Dept: FAMILY MEDICINE | Facility: CLINIC | Age: 80
End: 2020-04-20

## 2020-04-24 ENCOUNTER — COMMUNICATION - HEALTHEAST (OUTPATIENT)
Dept: FAMILY MEDICINE | Facility: CLINIC | Age: 80
End: 2020-04-24

## 2020-04-24 DIAGNOSIS — N40.0 BPH (BENIGN PROSTATIC HYPERPLASIA): ICD-10-CM

## 2020-05-05 ENCOUNTER — COMMUNICATION - HEALTHEAST (OUTPATIENT)
Dept: PHARMACY | Facility: CLINIC | Age: 80
End: 2020-05-05

## 2020-05-12 ENCOUNTER — COMMUNICATION - HEALTHEAST (OUTPATIENT)
Dept: FAMILY MEDICINE | Facility: CLINIC | Age: 80
End: 2020-05-12

## 2020-05-12 DIAGNOSIS — G47.00 INSOMNIA: ICD-10-CM

## 2020-09-20 ENCOUNTER — COMMUNICATION - HEALTHEAST (OUTPATIENT)
Dept: FAMILY MEDICINE | Facility: CLINIC | Age: 80
End: 2020-09-20

## 2020-09-20 DIAGNOSIS — F02.80 LEWY BODY DEMENTIA (H): ICD-10-CM

## 2020-09-20 DIAGNOSIS — G31.83 LEWY BODY DEMENTIA (H): ICD-10-CM

## 2020-09-23 ENCOUNTER — COMMUNICATION - HEALTHEAST (OUTPATIENT)
Dept: CARDIOLOGY | Facility: CLINIC | Age: 80
End: 2020-09-23

## 2020-09-23 DIAGNOSIS — Z95.2 HEART VALVE REPLACED: ICD-10-CM

## 2020-09-23 RX ORDER — METOPROLOL SUCCINATE 25 MG/1
TABLET, EXTENDED RELEASE ORAL
Qty: 90 TABLET | Refills: 1 | Status: SHIPPED | OUTPATIENT
Start: 2020-09-23 | End: 2021-07-23

## 2020-10-13 ENCOUNTER — OFFICE VISIT - HEALTHEAST (OUTPATIENT)
Dept: FAMILY MEDICINE | Facility: CLINIC | Age: 80
End: 2020-10-13

## 2020-10-13 DIAGNOSIS — N18.30 TYPE 2 DIABETES MELLITUS WITH STAGE 3 CHRONIC KIDNEY DISEASE, WITH LONG-TERM CURRENT USE OF INSULIN, UNSPECIFIED WHETHER STAGE 3A OR 3B CKD (H): ICD-10-CM

## 2020-10-13 DIAGNOSIS — G31.83 LEWY BODY DEMENTIA (H): ICD-10-CM

## 2020-10-13 DIAGNOSIS — M79.2 NEUROPATHIC PAIN: ICD-10-CM

## 2020-10-13 DIAGNOSIS — F33.0 MILD EPISODE OF RECURRENT MAJOR DEPRESSIVE DISORDER (H): ICD-10-CM

## 2020-10-13 DIAGNOSIS — E11.22 TYPE 2 DIABETES MELLITUS WITH STAGE 3 CHRONIC KIDNEY DISEASE, WITH LONG-TERM CURRENT USE OF INSULIN, UNSPECIFIED WHETHER STAGE 3A OR 3B CKD (H): ICD-10-CM

## 2020-10-13 DIAGNOSIS — H91.93 BILATERAL HEARING LOSS, UNSPECIFIED HEARING LOSS TYPE: ICD-10-CM

## 2020-10-13 DIAGNOSIS — I73.9 PERIPHERAL VASCULAR DISEASE, UNSPECIFIED (H): ICD-10-CM

## 2020-10-13 DIAGNOSIS — F02.80 LEWY BODY DEMENTIA (H): ICD-10-CM

## 2020-10-13 DIAGNOSIS — Z79.4 TYPE 2 DIABETES MELLITUS WITH STAGE 3 CHRONIC KIDNEY DISEASE, WITH LONG-TERM CURRENT USE OF INSULIN, UNSPECIFIED WHETHER STAGE 3A OR 3B CKD (H): ICD-10-CM

## 2020-10-13 DIAGNOSIS — Z12.5 SCREENING FOR PROSTATE CANCER: ICD-10-CM

## 2020-10-13 LAB
HBA1C MFR BLD: 6.7 %
LDLC SERPL CALC-MCNC: 122 MG/DL
PSA SERPL-MCNC: 2 NG/ML (ref 0–6.5)

## 2020-10-13 RX ORDER — PREGABALIN 50 MG/1
50 CAPSULE ORAL AT BEDTIME
Qty: 90 CAPSULE | Refills: 3 | Status: SHIPPED | OUTPATIENT
Start: 2020-10-13 | End: 2021-11-29

## 2020-10-13 RX ORDER — DONEPEZIL HYDROCHLORIDE 5 MG/1
TABLET, FILM COATED ORAL
Qty: 90 TABLET | Refills: 3 | Status: SHIPPED | OUTPATIENT
Start: 2020-10-13 | End: 2022-02-07

## 2020-10-13 ASSESSMENT — MIFFLIN-ST. JEOR: SCORE: 1620.92

## 2020-10-14 ENCOUNTER — COMMUNICATION - HEALTHEAST (OUTPATIENT)
Dept: FAMILY MEDICINE | Facility: CLINIC | Age: 80
End: 2020-10-14

## 2020-10-22 ENCOUNTER — COMMUNICATION - HEALTHEAST (OUTPATIENT)
Dept: FAMILY MEDICINE | Facility: CLINIC | Age: 80
End: 2020-10-22

## 2020-10-22 DIAGNOSIS — E11.9 TYPE 2 DIABETES MELLITUS WITHOUT COMPLICATION, WITHOUT LONG-TERM CURRENT USE OF INSULIN (H): ICD-10-CM

## 2020-10-29 ENCOUNTER — COMMUNICATION - HEALTHEAST (OUTPATIENT)
Dept: FAMILY MEDICINE | Facility: CLINIC | Age: 80
End: 2020-10-29

## 2020-10-29 DIAGNOSIS — G47.00 INSOMNIA: ICD-10-CM

## 2020-11-02 RX ORDER — TRAZODONE HYDROCHLORIDE 100 MG/1
TABLET ORAL
Qty: 90 TABLET | Refills: 3 | Status: SHIPPED | OUTPATIENT
Start: 2020-11-02 | End: 2021-11-02

## 2020-11-09 ENCOUNTER — COMMUNICATION - HEALTHEAST (OUTPATIENT)
Dept: CARDIOLOGY | Facility: CLINIC | Age: 80
End: 2020-11-09

## 2020-11-09 DIAGNOSIS — E78.5 DYSLIPIDEMIA: ICD-10-CM

## 2020-11-10 ENCOUNTER — OFFICE VISIT - HEALTHEAST (OUTPATIENT)
Dept: FAMILY MEDICINE | Facility: CLINIC | Age: 80
End: 2020-11-10

## 2020-11-10 DIAGNOSIS — Z79.4 TYPE 2 DIABETES MELLITUS WITH STAGE 3 CHRONIC KIDNEY DISEASE, WITH LONG-TERM CURRENT USE OF INSULIN, UNSPECIFIED WHETHER STAGE 3A OR 3B CKD (H): ICD-10-CM

## 2020-11-10 DIAGNOSIS — N18.30 TYPE 2 DIABETES MELLITUS WITH STAGE 3 CHRONIC KIDNEY DISEASE, WITH LONG-TERM CURRENT USE OF INSULIN, UNSPECIFIED WHETHER STAGE 3A OR 3B CKD (H): ICD-10-CM

## 2020-11-10 DIAGNOSIS — E11.22 TYPE 2 DIABETES MELLITUS WITH STAGE 3 CHRONIC KIDNEY DISEASE, WITH LONG-TERM CURRENT USE OF INSULIN, UNSPECIFIED WHETHER STAGE 3A OR 3B CKD (H): ICD-10-CM

## 2020-11-10 DIAGNOSIS — M15.0 PRIMARY OSTEOARTHRITIS INVOLVING MULTIPLE JOINTS: ICD-10-CM

## 2020-11-10 ASSESSMENT — PATIENT HEALTH QUESTIONNAIRE - PHQ9: SUM OF ALL RESPONSES TO PHQ QUESTIONS 1-9: 9

## 2020-11-10 ASSESSMENT — MIFFLIN-ST. JEOR: SCORE: 1614.37

## 2020-12-17 ENCOUNTER — COMMUNICATION - HEALTHEAST (OUTPATIENT)
Dept: FAMILY MEDICINE | Facility: CLINIC | Age: 80
End: 2020-12-17

## 2020-12-17 DIAGNOSIS — F33.1 MODERATE EPISODE OF RECURRENT MAJOR DEPRESSIVE DISORDER (H): ICD-10-CM

## 2020-12-18 RX ORDER — FLUOXETINE 10 MG/1
CAPSULE ORAL
Qty: 90 CAPSULE | Refills: 3 | Status: SHIPPED | OUTPATIENT
Start: 2020-12-18 | End: 2022-01-19

## 2021-01-02 ENCOUNTER — COMMUNICATION - HEALTHEAST (OUTPATIENT)
Dept: FAMILY MEDICINE | Facility: CLINIC | Age: 81
End: 2021-01-02

## 2021-01-02 DIAGNOSIS — N39.43 BENIGN PROSTATIC HYPERPLASIA WITH POST-VOID DRIBBLING: ICD-10-CM

## 2021-01-02 DIAGNOSIS — N40.1 BENIGN PROSTATIC HYPERPLASIA WITH POST-VOID DRIBBLING: ICD-10-CM

## 2021-01-04 RX ORDER — FINASTERIDE 5 MG/1
TABLET, FILM COATED ORAL
Qty: 90 TABLET | Refills: 3 | Status: SHIPPED | OUTPATIENT
Start: 2021-01-04 | End: 2021-11-11

## 2021-01-17 ENCOUNTER — COMMUNICATION - HEALTHEAST (OUTPATIENT)
Dept: FAMILY MEDICINE | Facility: CLINIC | Age: 81
End: 2021-01-17

## 2021-01-17 DIAGNOSIS — E11.9 TYPE 2 DIABETES MELLITUS WITHOUT COMPLICATION, WITHOUT LONG-TERM CURRENT USE OF INSULIN (H): ICD-10-CM

## 2021-01-17 DIAGNOSIS — N40.0 BPH (BENIGN PROSTATIC HYPERPLASIA): ICD-10-CM

## 2021-01-18 ENCOUNTER — COMMUNICATION - HEALTHEAST (OUTPATIENT)
Dept: CARDIOLOGY | Facility: CLINIC | Age: 81
End: 2021-01-18

## 2021-01-18 DIAGNOSIS — E78.2 MIXED HYPERLIPIDEMIA: ICD-10-CM

## 2021-01-18 RX ORDER — TAMSULOSIN HYDROCHLORIDE 0.4 MG/1
CAPSULE ORAL
Qty: 90 CAPSULE | Refills: 2 | Status: SHIPPED | OUTPATIENT
Start: 2021-01-18 | End: 2022-02-07

## 2021-01-22 ENCOUNTER — AMBULATORY - HEALTHEAST (OUTPATIENT)
Dept: OTHER | Facility: CLINIC | Age: 81
End: 2021-01-22

## 2021-02-16 ENCOUNTER — COMMUNICATION - HEALTHEAST (OUTPATIENT)
Dept: FAMILY MEDICINE | Facility: CLINIC | Age: 81
End: 2021-02-16

## 2021-02-17 ENCOUNTER — COMMUNICATION - HEALTHEAST (OUTPATIENT)
Dept: FAMILY MEDICINE | Facility: CLINIC | Age: 81
End: 2021-02-17

## 2021-02-17 DIAGNOSIS — E11.9 DM2 (DIABETES MELLITUS, TYPE 2) (H): ICD-10-CM

## 2021-02-26 ENCOUNTER — RECORDS - HEALTHEAST (OUTPATIENT)
Dept: ADMINISTRATIVE | Facility: OTHER | Age: 81
End: 2021-02-26

## 2021-03-02 ENCOUNTER — AMBULATORY - HEALTHEAST (OUTPATIENT)
Dept: NURSING | Facility: CLINIC | Age: 81
End: 2021-03-02

## 2021-03-08 ENCOUNTER — OFFICE VISIT - HEALTHEAST (OUTPATIENT)
Dept: FAMILY MEDICINE | Facility: CLINIC | Age: 81
End: 2021-03-08

## 2021-03-08 DIAGNOSIS — I25.10 CORONARY ARTERY DISEASE INVOLVING NATIVE HEART WITHOUT ANGINA PECTORIS, UNSPECIFIED VESSEL OR LESION TYPE: ICD-10-CM

## 2021-03-08 DIAGNOSIS — H91.93 BILATERAL HEARING LOSS, UNSPECIFIED HEARING LOSS TYPE: ICD-10-CM

## 2021-03-08 DIAGNOSIS — Z79.4 TYPE 2 DIABETES MELLITUS WITH STAGE 3 CHRONIC KIDNEY DISEASE, WITH LONG-TERM CURRENT USE OF INSULIN, UNSPECIFIED WHETHER STAGE 3A OR 3B CKD (H): ICD-10-CM

## 2021-03-08 DIAGNOSIS — G31.83 LEWY BODY DEMENTIA WITH BEHAVIORAL DISTURBANCE (H): ICD-10-CM

## 2021-03-08 DIAGNOSIS — N18.30 TYPE 2 DIABETES MELLITUS WITH STAGE 3 CHRONIC KIDNEY DISEASE, WITH LONG-TERM CURRENT USE OF INSULIN, UNSPECIFIED WHETHER STAGE 3A OR 3B CKD (H): ICD-10-CM

## 2021-03-08 DIAGNOSIS — F02.818 LEWY BODY DEMENTIA WITH BEHAVIORAL DISTURBANCE (H): ICD-10-CM

## 2021-03-08 DIAGNOSIS — E11.22 TYPE 2 DIABETES MELLITUS WITH STAGE 3 CHRONIC KIDNEY DISEASE, WITH LONG-TERM CURRENT USE OF INSULIN, UNSPECIFIED WHETHER STAGE 3A OR 3B CKD (H): ICD-10-CM

## 2021-03-08 DIAGNOSIS — Z00.00 ROUTINE GENERAL MEDICAL EXAMINATION AT A HEALTH CARE FACILITY: ICD-10-CM

## 2021-03-08 LAB
ANION GAP SERPL CALCULATED.3IONS-SCNC: 8 MMOL/L (ref 5–18)
BUN SERPL-MCNC: 22 MG/DL (ref 8–28)
CALCIUM SERPL-MCNC: 9 MG/DL (ref 8.5–10.5)
CHLORIDE BLD-SCNC: 104 MMOL/L (ref 98–107)
CO2 SERPL-SCNC: 27 MMOL/L (ref 22–31)
CREAT SERPL-MCNC: 1.43 MG/DL (ref 0.7–1.3)
GFR SERPL CREATININE-BSD FRML MDRD: 48 ML/MIN/1.73M2
GLUCOSE BLD-MCNC: 161 MG/DL (ref 70–125)
HBA1C MFR BLD: 6.8 %
POTASSIUM BLD-SCNC: 4 MMOL/L (ref 3.5–5)
SODIUM SERPL-SCNC: 139 MMOL/L (ref 136–145)

## 2021-03-08 ASSESSMENT — MIFFLIN-ST. JEOR: SCORE: 1627.98

## 2021-03-08 ASSESSMENT — PATIENT HEALTH QUESTIONNAIRE - PHQ9: SUM OF ALL RESPONSES TO PHQ QUESTIONS 1-9: 11

## 2021-03-23 ENCOUNTER — AMBULATORY - HEALTHEAST (OUTPATIENT)
Dept: NURSING | Facility: CLINIC | Age: 81
End: 2021-03-23

## 2021-03-24 ENCOUNTER — OFFICE VISIT - HEALTHEAST (OUTPATIENT)
Dept: AUDIOLOGY | Facility: CLINIC | Age: 81
End: 2021-03-24

## 2021-03-24 ENCOUNTER — OFFICE VISIT - HEALTHEAST (OUTPATIENT)
Dept: OTOLARYNGOLOGY | Facility: CLINIC | Age: 81
End: 2021-03-24

## 2021-03-24 DIAGNOSIS — H90.3 SENSORINEURAL HEARING LOSS, BILATERAL: ICD-10-CM

## 2021-03-24 DIAGNOSIS — H90.3 SENSORINEURAL HEARING LOSS (SNHL) OF BOTH EARS: ICD-10-CM

## 2021-03-24 DIAGNOSIS — H61.23 BILATERAL IMPACTED CERUMEN: ICD-10-CM

## 2021-03-25 ENCOUNTER — COMMUNICATION - HEALTHEAST (OUTPATIENT)
Dept: CARDIOLOGY | Facility: CLINIC | Age: 81
End: 2021-03-25

## 2021-03-26 ENCOUNTER — OFFICE VISIT - HEALTHEAST (OUTPATIENT)
Dept: CARDIOLOGY | Facility: CLINIC | Age: 81
End: 2021-03-26

## 2021-03-26 DIAGNOSIS — Z95.2 S/P AVR: ICD-10-CM

## 2021-04-13 ENCOUNTER — COMMUNICATION - HEALTHEAST (OUTPATIENT)
Dept: FAMILY MEDICINE | Facility: CLINIC | Age: 81
End: 2021-04-13

## 2021-04-13 DIAGNOSIS — E11.9 TYPE 2 DIABETES MELLITUS WITHOUT COMPLICATION, WITHOUT LONG-TERM CURRENT USE OF INSULIN (H): ICD-10-CM

## 2021-04-14 RX ORDER — GLIPIZIDE 2.5 MG/1
TABLET, EXTENDED RELEASE ORAL
Qty: 90 TABLET | Refills: 3 | Status: SHIPPED | OUTPATIENT
Start: 2021-04-14 | End: 2022-05-17

## 2021-04-20 ENCOUNTER — HOSPITAL ENCOUNTER (OUTPATIENT)
Dept: CARDIOLOGY | Facility: HOSPITAL | Age: 81
Discharge: HOME | End: 2021-04-20
Attending: INTERNAL MEDICINE

## 2021-04-20 LAB
AORTIC VALVE MEAN VELOCITY: 128 CM/S
AV DIMENSIONLESS INDEX VTI: 0.5
AV MEAN GRADIENT: 7 MMHG
AV PEAK GRADIENT: 13.1 MMHG
AV VALVE AREA: 1.7 CM2
AV VELOCITY RATIO: 0.6
BSA FOR ECHO PROCEDURE: 2.11 M2
CV BLOOD PRESSURE: ABNORMAL MMHG
CV ECHO HEIGHT: 70 IN
CV ECHO WEIGHT: 200 LBS
DOP CALC AO PEAK VEL: 181 CM/S
DOP CALC AO VTI: 42.1 CM
DOP CALC LVOT AREA: 3.14 CM2
DOP CALC LVOT DIAMETER: 2 CM
DOP CALC LVOT PEAK VEL: 109 CM/S
DOP CALC LVOT STROKE VOLUME: 71.3 CM3
DOP CALC MV VTI: 49.5 CM
DOP CALCLVOT PEAK VEL VTI: 22.7 CM
EJECTION FRACTION: 63 % (ref 55–75)
FRACTIONAL SHORTENING: 33.3 % (ref 28–44)
INTERVENTRICULAR SEPTUM IN END DIASTOLE: 1.8 CM (ref 0.6–1)
IVS/PW RATIO: 1.1
LA AREA 1: 20.1 CM2
LA AREA 2: 21.5 CM2
LEFT ATRIUM LENGTH: 5.49 CM
LEFT ATRIUM SIZE: 3.4 CM
LEFT ATRIUM VOLUME INDEX: 31.7 ML/M2
LEFT ATRIUM VOLUME: 66.9 ML
LEFT VENTRICLE CARDIAC INDEX: 2.2 L/MIN/M2
LEFT VENTRICLE CARDIAC OUTPUT: 4.6 L/MIN
LEFT VENTRICLE DIASTOLIC VOLUME INDEX: 46.9 CM3/M2 (ref 34–74)
LEFT VENTRICLE DIASTOLIC VOLUME: 99 CM3 (ref 62–150)
LEFT VENTRICLE HEART RATE: 65 BPM
LEFT VENTRICLE MASS INDEX: 93 G/M2
LEFT VENTRICLE SYSTOLIC VOLUME INDEX: 17.5 CM3/M2 (ref 11–31)
LEFT VENTRICLE SYSTOLIC VOLUME: 37 CM3 (ref 21–61)
LEFT VENTRICULAR INTERNAL DIMENSION IN DIASTOLE: 3 CM (ref 4.2–5.8)
LEFT VENTRICULAR INTERNAL DIMENSION IN SYSTOLE: 2 CM (ref 2.5–4)
LEFT VENTRICULAR MASS: 196.2 G
LEFT VENTRICULAR OUTFLOW TRACT MEAN GRADIENT: 2 MMHG
LEFT VENTRICULAR OUTFLOW TRACT MEAN VELOCITY: 70.9 CM/S
LEFT VENTRICULAR OUTFLOW TRACT PEAK GRADIENT: 5 MMHG
LEFT VENTRICULAR POSTERIOR WALL IN END DIASTOLE: 1.6 CM (ref 0.6–1)
LV STROKE VOLUME INDEX: 33.8 ML/M2
MITRAL VALVE E/A RATIO: 0.9
MITRAL VALVE MEAN INFLOW VELOCITY: 73 CM/S
MITRAL VALVE PEAK VELOCITY: 119 CM/S
MV AREA VTI: 1.44 CM2
MV AVERAGE E/E' RATIO: 20.3 CM/S
MV DECELERATION TIME: 363 MS
MV E'TISSUE VEL-LAT: 7.02 CM/S
MV E'TISSUE VEL-MED: 3.51 CM/S
MV LATERAL E/E' RATIO: 15.2
MV MEAN GRADIENT: 2 MMHG
MV MEDIAL E/E' RATIO: 30.5
MV PEAK A VELOCITY: 118 CM/S
MV PEAK E VELOCITY: 107 CM/S
MV PEAK GRADIENT: 5.7 MMHG
MV VALVE AREA BY CONTINUITY EQUATION: 1.4 CM2
NUC REST DIASTOLIC VOLUME INDEX: 3200 LBS
NUC REST SYSTOLIC VOLUME INDEX: 70 IN
TRICUSPID VALVE ANULAR PLANE SYSTOLIC EXCURSION: 0.9 CM

## 2021-04-20 ASSESSMENT — MIFFLIN-ST. JEOR: SCORE: 1623.44

## 2021-04-27 ENCOUNTER — COMMUNICATION - HEALTHEAST (OUTPATIENT)
Dept: CARDIOLOGY | Facility: CLINIC | Age: 81
End: 2021-04-27

## 2021-04-27 ENCOUNTER — AMBULATORY - HEALTHEAST (OUTPATIENT)
Dept: CARDIOLOGY | Facility: CLINIC | Age: 81
End: 2021-04-27

## 2021-04-27 DIAGNOSIS — G47.33 OSA (OBSTRUCTIVE SLEEP APNEA): ICD-10-CM

## 2021-05-06 ENCOUNTER — OFFICE VISIT - HEALTHEAST (OUTPATIENT)
Dept: AUDIOLOGY | Facility: CLINIC | Age: 81
End: 2021-05-06

## 2021-05-06 DIAGNOSIS — H90.3 SENSORINEURAL HEARING LOSS, BILATERAL: ICD-10-CM

## 2021-05-25 ENCOUNTER — RECORDS - HEALTHEAST (OUTPATIENT)
Dept: ADMINISTRATIVE | Facility: CLINIC | Age: 81
End: 2021-05-25

## 2021-05-26 ENCOUNTER — RECORDS - HEALTHEAST (OUTPATIENT)
Dept: ADMINISTRATIVE | Facility: CLINIC | Age: 81
End: 2021-05-26

## 2021-05-26 ASSESSMENT — PATIENT HEALTH QUESTIONNAIRE - PHQ9: SUM OF ALL RESPONSES TO PHQ QUESTIONS 1-9: 5

## 2021-05-27 ASSESSMENT — PATIENT HEALTH QUESTIONNAIRE - PHQ9
SUM OF ALL RESPONSES TO PHQ QUESTIONS 1-9: 11
SUM OF ALL RESPONSES TO PHQ QUESTIONS 1-9: 9

## 2021-05-27 NOTE — TELEPHONE ENCOUNTER
Refill Request  Did you contact pharmacy: No  Medication name:   Requested Prescriptions     Pending Prescriptions Disp Refills     metFORMIN (GLUCOPHAGE) 1000 MG tablet 180 tablet 2     Sig: Take 1 tablet (1,000 mg total) by mouth 2 (two) times a day with meals.     Who prescribed the medication: Anastasiia Hu MD  Pharmacy Name and Location: [facility] [id number] [facility] - [place], [place]   Is patient out of medication: Yes  Patient notified refills processed in 72 hours:  no  Okay to leave a detailed message: no

## 2021-05-27 NOTE — TELEPHONE ENCOUNTER
RN cannot approve Refill Request    RN can NOT refill this medication overdue for office visits and/or labs.    Gene Aceves, [facility] Triage/Med Refill 4/18/2019    Requested Prescriptions   Pending Prescriptions Disp Refills     metFORMIN (GLUCOPHAGE) 1000 MG tablet 180 tablet 2     Sig: Take 1 tablet (1,000 mg total) by mouth 2 (two) times a day with meals.       Metformin Refill Protocol Failed - 4/18/2019  1:51 PM        Failed - Microalbumin in last year      Microalbumin, Random Urine   Date Value Ref Range Status   09/06/2013 1.15 <2.00 mg/dL Final     Comment:     <2.0 mg/dL . . . . . . . .  Normal                  3.0-30.0 mg/dL  . . . . .  Microalbuminuria                 >30.0 mg/dL  . . . . . . .  Clinical Proteinuria                  Passed - Blood pressure in last 12 months     BP Readings from Last 1 Encounters:   02/13/19 138/70             Passed - LFT or AST or ALT in last 12 months     Albumin   Date Value Ref Range Status   02/13/2019 4.1 3.5 - 5.0 g/dL Final     Bilirubin, Total   Date Value Ref Range Status   02/13/2019 0.8 0.0 - 1.0 mg/dL Final     Alkaline Phosphatase   Date Value Ref Range Status   02/13/2019 42 (L) 45 - 120 U/L Final     AST   Date Value Ref Range Status   02/13/2019 27 0 - 40 U/L Final     ALT   Date Value Ref Range Status   02/13/2019 19 0 - 45 U/L Final     Protein, Total   Date Value Ref Range Status   02/13/2019 7.4 6.0 - 8.0 g/dL Final                Passed - GFR or Serum Creatinine in last 6 months     GFR MDRD Non Af Amer   Date Value Ref Range Status   02/13/2019 44 (L) >60 mL/min/1.73m2 Final     GFR MDRD Af Amer   Date Value Ref Range Status   02/13/2019 53 (L) >60 mL/min/1.73m2 Final             Passed - Visit with PCP or prescribing provider visit in last 6 months or next 3 months     Last office visit with prescriber/PCP: 2/13/2019 OR same dept: 2/13/2019 Francisco Javier Rojas MD OR same specialty: 2/13/2019 Francisco Javier Rojas MD Last physical: Visit date  not found Last MTM visit: Visit date not found         Next appt within 3 mo: Visit date not found  Next physical within 3 mo: Visit date not found  Prescriber OR PCP: Francisco Javier Rojas MD  Last diagnosis associated with med order: 1. DM2 (diabetes mellitus, type 2) (H)  - metFORMIN (GLUCOPHAGE) 1000 MG tablet; Take 1 tablet (1,000 mg total) by mouth 2 (two) times a day with meals.  Dispense: 180 tablet; Refill: 2     If protocol passes may refill for 12 months if within 3 months of last provider visit (or a total of 15 months).           Passed - A1C in last 6 months     Hemoglobin A1c   Date Value Ref Range Status   02/13/2019 6.6 (H) 3.5 - 6.0 % Final

## 2021-05-28 NOTE — TELEPHONE ENCOUNTER
Refill Approved    Rx renewed per Medication Renewal Policy. Medication was last renewed on 3/23/18.    Lucy Zamudio, [facility] Connection Triage/Med Refill 5/10/2019     Requested Prescriptions   Pending Prescriptions Disp Refills     tamsulosin (FLOMAX) 0.4 mg cap 90 capsule 2     Sig: Take 1 capsule (0.4 mg total) by mouth daily.       Alfuzosin/Tamsulosin/Silodosin Refill Protocol  Passed - 5/10/2019 11:57 AM        Passed - PCP or prescribing provider visit in past 12 months       Last office visit with prescriber/PCP: 2/13/2019 Francisco Javier Rojas MD OR same dept: 2/13/2019 Francisco Javier Rojas MD OR same specialty: 2/13/2019 Francisco Javier Rojas MD  Last physical: 11/1/2017 Last MTM visit: Visit date not found   Next visit within 3 mo: Visit date not found  Next physical within 3 mo: Visit date not found  Prescriber OR PCP: Francisco Javier Rojas MD  Last diagnosis associated with med order: 1. BPH (benign prostatic hyperplasia)  - tamsulosin (FLOMAX) 0.4 mg cap; Take 1 capsule (0.4 mg total) by mouth daily.  Dispense: 90 capsule; Refill: 2    If protocol passes may refill for 12 months if within 3 months of last provider visit (or a total of 15 months).

## 2021-05-29 ENCOUNTER — RECORDS - HEALTHEAST (OUTPATIENT)
Dept: ADMINISTRATIVE | Facility: CLINIC | Age: 81
End: 2021-05-29

## 2021-05-29 NOTE — PROGRESS NOTES
Patient's wife reports that he has not been sleeping very well with his current 75 mg dose of trazodone.  They would like to increase the dose.  Reviewed the risks and benefits, increased to 100 mg at bedtime, follow-up if problems.

## 2021-05-30 ENCOUNTER — RECORDS - HEALTHEAST (OUTPATIENT)
Dept: ADMINISTRATIVE | Facility: CLINIC | Age: 81
End: 2021-05-30

## 2021-05-30 VITALS — HEIGHT: 72 IN | BODY MASS INDEX: 29.42 KG/M2 | WEIGHT: 217.25 LBS

## 2021-05-30 NOTE — TELEPHONE ENCOUNTER
Refill Approved    Rx renewed per Medication Renewal Policy. Medication was last renewed on 7/8/18.    Judi Raymundo, [facility] Connection Triage/Med Refill 6/29/2019     Requested Prescriptions   Pending Prescriptions Disp Refills     FLUoxetine (PROZAC) 10 MG capsule [Pharmacy Med Name: FLUOXETINE HCL 10 MG CAPSULE] 90 capsule 3     Sig: TAKE 1 CAPSULE BY MOUTH EVERY DAY       SSRI Refill Protocol  Passed - 6/29/2019  8:36 AM        Passed - PCP or prescribing provider visit in last year     Last office visit with prescriber/PCP: Visit date not found OR same dept: 2/13/2019 Francisco Javier Rojas MD OR same specialty: 2/13/2019 Francisco Javier Rojas MD  Last physical: Visit date not found Last MTM visit: Visit date not found   Next visit within 3 mo: Visit date not found  Next physical within 3 mo: Visit date not found  Prescriber OR PCP: Frieda Eisenberg MD  Last diagnosis associated with med order: 1. Moderate episode of recurrent major depressive disorder (H)  - FLUoxetine (PROZAC) 10 MG capsule [Pharmacy Med Name: FLUOXETINE HCL 10 MG CAPSULE]; TAKE 1 CAPSULE BY MOUTH EVERY DAY  Dispense: 90 capsule; Refill: 3    If protocol passes may refill for 12 months if within 3 months of last provider visit (or a total of 15 months).

## 2021-05-31 ENCOUNTER — RECORDS - HEALTHEAST (OUTPATIENT)
Dept: ADMINISTRATIVE | Facility: CLINIC | Age: 81
End: 2021-05-31

## 2021-05-31 VITALS — BODY MASS INDEX: 28.88 KG/M2 | HEIGHT: 72 IN | WEIGHT: 213.25 LBS

## 2021-05-31 VITALS — BODY MASS INDEX: 27.77 KG/M2 | WEIGHT: 205 LBS | HEIGHT: 72 IN

## 2021-05-31 VITALS — BODY MASS INDEX: 28.7 KG/M2 | HEIGHT: 71 IN | WEIGHT: 205 LBS

## 2021-05-31 VITALS — BODY MASS INDEX: 29.43 KG/M2 | WEIGHT: 211 LBS

## 2021-06-01 VITALS — HEIGHT: 71 IN | WEIGHT: 212.75 LBS | BODY MASS INDEX: 29.78 KG/M2

## 2021-06-01 VITALS — WEIGHT: 217 LBS | BODY MASS INDEX: 30.38 KG/M2 | HEIGHT: 71 IN

## 2021-06-01 VITALS — HEIGHT: 71 IN | WEIGHT: 216 LBS | BODY MASS INDEX: 30.24 KG/M2

## 2021-06-01 VITALS — WEIGHT: 215 LBS | HEIGHT: 71 IN | BODY MASS INDEX: 30.1 KG/M2

## 2021-06-01 NOTE — TELEPHONE ENCOUNTER
RN cannot approve Refill Request    RN can NOT refill this medication Protocol failed and RN gave 3 month refill.         Judi Raymundo, [facility] Triage/Med Refill 9/23/2019    Requested Prescriptions   Pending Prescriptions Disp Refills     ACCU-CHEK DAISHA PLUS TEST STRP strips [Pharmacy Med Name: ACCU-CHEK DAISHA PLUS TEST STRP] 50 strip 7     Sig: USE TO TEST BLOOD SUGAR ONCE A DAY AS DIRECTED       Diabetic Supplies Refill Protocol Failed - 9/23/2019  2:04 AM        Failed - Visit with PCP or prescribing provider visit in last 6 months     Last office visit with prescriber/PCP: 2/13/2019 Francisco Javier Rojas MD OR same dept: 2/13/2019 Francisco Javier Rojas MD OR same specialty: 2/13/2019 Francisco Javier Rojas MD  Last physical: 11/1/2017 Last MTM visit: Visit date not found   Next visit within 3 mo: Visit date not found  Next physical within 3 mo: Visit date not found  Prescriber OR PCP: Francisco Javier Rojas MD  Last diagnosis associated with med order: 1. Type 2 diabetes mellitus (H)  - ACCU-CHEK DAISHA PLUS TEST STRP strips [Pharmacy Med Name: ACCU-CHEK DAISHA PLUS TEST STRP]; USE TO TEST BLOOD SUGAR ONCE A DAY AS DIRECTED  Dispense: 50 strip; Refill: 7    If protocol passes may refill for 12 months if within 3 months of last provider visit (or a total of 15 months).             Failed - A1C in last 6 months     Hemoglobin A1c   Date Value Ref Range Status   02/13/2019 6.6 (H) 3.5 - 6.0 % Final

## 2021-06-01 NOTE — TELEPHONE ENCOUNTER
Unable to reach today x 2 attempts for MTM visit.     Janette Honeycutt, PharmD  Medication Therapy Management Pharmacist  [facility]

## 2021-06-01 NOTE — TELEPHONE ENCOUNTER
RN cannot approve Refill Request    RN can NOT refill this medication Protocol failed and NO refill given        Judi Raymundo, [facility] Triage/Med Refill 9/24/2019    Requested Prescriptions   Pending Prescriptions Disp Refills     donepezil (ARICEPT) 5 MG tablet [Pharmacy Med Name: DONEPEZIL HCL 5 MG TABLET] 90 tablet 3     Sig: TAKE 1 TABLET BY MOUTH EVERYDAY AT BEDTIME. DUE FOR AN APPOINTMENT.       Cholinesterase Inhibitor/Anti-Alzheimer Agent Refill Protocol Failed - 9/24/2019  1:52 AM        Failed - Visit with PCP or prescribing provider visit in last 6 months or next 3 months     Last office visit with prescriber/PCP: Visit date not found OR same dept: 2/13/2019 Francisco Javier Rojas MD OR same specialty: 2/13/2019 Francisco Javier Rojas MD Last physical: Visit date not found Last MTM visit: Visit date not found     Next appt within 3 mo: Visit date not found  Next physical within 3 mo: Visit date not found  Prescriber OR PCP: Francisco Javier Rojas MD  Last diagnosis associated with med order: 1. Lewy body dementia  - donepezil (ARICEPT) 5 MG tablet [Pharmacy Med Name: DONEPEZIL HCL 5 MG TABLET]; TAKE 1 TABLET BY MOUTH EVERYDAY AT BEDTIME. DUE FOR AN APPOINTMENT.  Dispense: 90 tablet; Refill: 1    If protocol passes may refill for 6 months if within 3 months of last provider visit (or a total of 9 months).

## 2021-06-02 ENCOUNTER — RECORDS - HEALTHEAST (OUTPATIENT)
Dept: ADMINISTRATIVE | Facility: CLINIC | Age: 81
End: 2021-06-02

## 2021-06-02 VITALS — WEIGHT: 218 LBS | HEIGHT: 71 IN | BODY MASS INDEX: 30.52 KG/M2

## 2021-06-02 VITALS — HEIGHT: 71 IN | WEIGHT: 217 LBS | BODY MASS INDEX: 30.38 KG/M2

## 2021-06-02 VITALS — WEIGHT: 218 LBS | BODY MASS INDEX: 30.4 KG/M2

## 2021-06-02 VITALS — HEIGHT: 71 IN | BODY MASS INDEX: 30.52 KG/M2 | WEIGHT: 218 LBS

## 2021-06-02 NOTE — TELEPHONE ENCOUNTER
RN cannot approve Refill Request    RN can NOT refill this medication PCP messaged that patient is overdue for Office Visit. Last office visit: 2/13/2019 Francisc oJavier Rojas MD Last Physical: 11/1/2017 Last MTM visit: Visit date not found Last visit same specialty: 2/13/2019 Francisco Javier Rojas MD.  Next visit within 3 mo: Visit date not found  Next physical within 3 mo: Visit date not found      Janeen Reynolds, [facility] Connection Triage/Med Refill 10/29/2019    Requested Prescriptions   Pending Prescriptions Disp Refills     glipiZIDE (GLUCOTROL XL) 2.5 MG 24 hr tablet [Pharmacy Med Name: GLIPIZIDE ER 2.5 MG TABLET] 90 tablet 3     Sig: TAKE 1 TABLET (2.5 MG TOTAL) BY MOUTH DAILY.       Oral Hypoglycemics Refill Protocol Failed - 10/29/2019  1:44 AM        Failed - Visit with PCP or prescribing provider visit in last 6 months       Last office visit with prescriber/PCP: Visit date not found OR same dept: 2/13/2019 Francisco Javier Rojas MD OR same specialty: 2/13/2019 Francisco Javier Rojas MD Last physical: Visit date not found Last MTM visit: Visit date not found         Next appt within 3 mo: Visit date not found  Next physical within 3 mo: Visit date not found  Prescriber OR PCP: Francisco Javier Rojas MD  Last diagnosis associated with med order: There are no diagnoses linked to this encounter.   If protocol passes may refill for 12 months if within 3 months of last provider visit (or a total of 15 months).           Failed - A1C in last 6 months     Hemoglobin A1c   Date Value Ref Range Status   02/13/2019 6.6 (H) 3.5 - 6.0 % Final               Failed - Microalbumin in last year      Microalbumin, Random Urine   Date Value Ref Range Status   09/06/2013 1.15 <2.00 mg/dL Final     Comment:     <2.0 mg/dL . . . . . . . .  Normal                  3.0-30.0 mg/dL  . . . . .  Microalbuminuria                 >30.0 mg/dL  . . . . . . .  Clinical Proteinuria                  Passed - Blood pressure in last year     BP Readings from Last 1  Encounters:   02/13/19 138/70             Passed - Serum creatinine in last year     Creatinine   Date Value Ref Range Status   02/13/2019 1.54 (H) 0.70 - 1.30 mg/dL Final

## 2021-06-02 NOTE — PATIENT INSTRUCTIONS - [facility]
Recommendations from today's PharmD medication management visit                                                       1. At your upcoming appointment with Dr. Rojas, I would recommend having your A1c, cholesterol, and urine (microalbuminuria) checked.     2. Try Miralax 17 G once daily for hard stools. Back off if you start to experience diarrhea.     3. The pharmacist at [facility]'s name is Willi -- reach out to her if you have any future questions or concerns about medications!     It was great to speak with you today.  I value your experience and would be very thankful for your time with providing feedback on our clinic survey. You may receive a survey via email or text message in the next few days.     My Pharmacist's contact information:                                                       It was a pleasure seeing you today to discuss your medications!  Please feel free to contact me with any questions or concerns you have. I look forward to seeing you again to help you get the most benefit from your medications!    To reschedule your PharmD appointment, please call the clinic directly or you may call the [facility] scheduling line at [phone] or toll-free at [phone]:   [place] (available for appointments Mondays and Thursdays)  [facility] (available for appointments Tuesday, Wednesday & Friday)     Jacqueline Pugh, Tito, BCACP  Medication Therapy Management Pharmacist  [facility]

## 2021-06-02 NOTE — PROGRESS NOTES
MTM Initial Encounter  Assessment & Plan                                                     Type 2 Diabetes:  well controlled. A1C at goal of <7%. FBG not at goal  mg/dL, but due to risk of hypoglycemia would not recommend being too aggressive. Overdue for A1c and microalbuminuria -- recommend checking at [facility].   PLAN:   1. Future A1c and microalbuminuria    Osteoarthritis/Back Pain: Patient feels pain is stable at this time. Should avoid NSAIDs due to CKD.     CAD: Patient to continue to follow up with cardiology. They refuse statin. Unclear if benefit from fenofibrate, but recommend future lipid panel.     BPH:  Still having some BPH symptoms, recommended discussing with PCP on 11/4. Possibly needs higher dose of tamsulosin.     Constipation: Has had harder stools lately. Recommended a trial of Miralax and to back off if diarrhea.   PLAN:   1. Try Miralax until stools softer    Hypertension: BP has overall been well controlled. Will recheck at upcoming appointment with PCP.     Aortic stenosis: patient to continue to follow up with cardiology.     Dementia: Used to follow with Dr. Watson. Wife feels he is doing well. Consider follow up in the future.     Depression: Per wife, controlled at this time. Patient is on a low dose. Appears that they are no longer taking bupropion, therefore removed from med list.     Insomnia: Well controlled. Recommended to continue current regimen.     Allergies: Stable. Recommended to continue current regimen.     GERD: Stable. Recommended to continue current regimen.     Supplements: Ok to continue supplements. Last Vitamin D level WNL.     Follow Up  As needed with MTM     Subjective & Objective                                                     Nestor Peterson is a 79 y.o. male called for an initial visit for Medication Therapy Management. He was referred to me from  [facility] program. Spoke to wife Pamela on the phone as well.     Chief Complaint: No concerns    Medication  "Adherence/Access: Pamela manages medication, sets up in pill box two times a day.     Type 2 Diabetes: Currently taking glipizide XL 2.5 mg daily and metformin 1000 mg twice daily. No diarrhea currently.   Tests BG 0-1 times daily -- tries to check in the morning.  Reports blood sugars fasting AM: 10/17 136, 10/18 117, 10/21 150  Last A1c checked 2/13/2019 =6.6%.   Hypoglycemia none -- never <70  Microalbumin checked 2013    Osteoarthritis/Back Pain: Currently taking Lyrica 50 mg 3 times daily. Does not currently have hydrocodone acetaminophen 5-325 mg, but reports he would request in the future if needed. Reports back pain has been ok.     CAD: Hx CABG. No longer on statin because patient and wife were concerned about dementia. Continues aspirin 81 mg daily and fenofibrate 160 mg daily. Follows with [facility].   Last LDL checked in 2016    BPH:  Currently taking finasteride 5 mg daily, tamsulosin 0.4 mg, and saw palmetto daily. Finasteride started 10/16/18. Reports nocturia x 0, urinary frequency, weak stream, has some accidents.     Constipation: Reports he usually has diarrhea and accidents, but last few days more like \"pushing out a brick.\" Has not tried anything.     Hypertension: Currently taking metoprolol succinate 25 mg daily. Patient does not monitor BP at home, but has a machine at home. Denies lightheadedness/dizziness.     Aortic stenosis: Status post bioprosthetic aortic valve replacement 7/2014.  Has prophylaxis amox prior to dental procedures. Last ECHO 10/12/18    Dementia: Currently taking donepezil 10 mg AM 5 mg PM. Wife thinks he is doing very well on donepezil.     Depression: Currently taking fluoxetine 10 mg daily. Reports they are not taking bupropion at all. Reports mood has been fine lately.     Insomnia: Currently taking trazodone 100 mg HS -- dose increased from 75 mg on 5/21/19. Reports sleeping good and no hangover.     Allergies: Currently taking cetirizine 10 mg daily and Flonase " nasal spray 1 spray twice daily. Allergies are now controlled since he is in the house. Uses yearround.     GERD: Currently taking ranitidine 150 mg twice daily. No symptoms right now.     Supplements: Currently taking vitamin D 1000 units daily, ginseng daily, and multivitamin daily.  Last vitamin D level = 46 in 2016        PMH: reviewed in EPIC   Allergies/ADRs: reviewed in EPIC   Alcohol: reviewed in EPIC  Tobacco:   Social History     Tobacco Use   Smoking Status Never Smoker   Smokeless Tobacco Never Used     Today's Vitals: There were no vitals filed for this visit.  ----------------    Much or all of the text in this note was generated through the use of Dragon Dictate voice-to-text software. Errors in spelling or words which seem out of context are unintentional. Sound alike errors, in particular, may have escaped editing.    The patient was given a CMS standardized format medication action plan    I spent 30 minutes with this patient today.   All changes were made via collaborative practice agreement with Francisco Javier Rojas MD. A copy of the visit note was provided to the patient's provider.     Jacqueline Pugh, Pharm.D., BCACP  Medication Therapy Management Pharmacist  [facility]     Current Outpatient Medications   Medication Sig Dispense Refill     ACCU-CHEK DAISHA PLUS TEST STRP strips USE TO TEST BLOOD SUGAR ONCE A DAY AS DIRECTED 100 strip 0     amoxicillin (AMOXIL) 500 MG capsule TAKE 4 CAPSULES BY MOUTH 1 HOUR BEFORE DENTAL APPOINTMENT 4 capsule 1     aspirin 81 MG tablet Take 81 mg by mouth daily.       buPROPion (WELLBUTRIN XL) 150 MG 24 hr tablet Take 1 tablet (150 mg total) by mouth daily. 30 tablet 11     cetirizine (ZYRTEC) 10 MG tablet Take 10 mg by mouth daily as needed for allergies.        cholecalciferol, vitamin D3, 1,000 unit tablet Take 1,000 Units by mouth daily.       diclofenac (VOLTAREN) 75 MG EC tablet TAKE 1 TABLET BY MOUTH TWICE A DAY 60 tablet 1     donepezil  (ARICEPT) 10 MG tablet TAKE 1 TABLET (10 MG) BY MOUTH DAILY. 90 tablet 3     donepezil (ARICEPT) 5 MG tablet TAKE 1 TABLET BY MOUTH EVERYDAY AT BEDTIME. DUE FOR AN APPOINTMENT. 90 tablet 3     fenofibrate (TRIGLIDE) 160 MG tablet TAKE 1 TABLET (160 MG TOTAL) BY MOUTH DAILY. 90 tablet 0     finasteride (PROSCAR) 5 mg tablet Take 1 tablet (5 mg total) by mouth daily. 90 tablet 3     FLUoxetine (PROZAC) 10 MG capsule TAKE 1 CAPSULE BY MOUTH EVERY DAY 90 capsule 2     fluticasone (FLONASE) 50 mcg/actuation nasal spray 1 spray into each nostril 2 times a day at 6:00 am and 4:00 pm. 16 g 6     FOLIC ACID/MULTIVIT-MIN/LUTEIN (CENTRUM SILVER ORAL) Take 1 tablet by mouth daily.       ginseng 100 mg cap Take 1 tablet by mouth daily.       glipiZIDE (GLUCOTROL XL) 2.5 MG 24 hr tablet TAKE 1 TABLET (2.5 MG TOTAL) BY MOUTH DAILY. 90 tablet 3     HYDROcodone-acetaminophen 5-325 mg per tablet Take 1 tablet by mouth every 6 (six) hours as needed for pain. 60 tablet 0     LANCETS MISC Use As Directed.       LYRICA 50 mg capsule TAKE 1 CAPSULE (50 MG) BY MOUTH THREE TIMES DAILY 90 capsule 2     LYRICA 50 mg capsule TAKE 1 CAPSULE (50 MG) BY MOUTH THREE TIMES DAILY 90 capsule 2     metFORMIN (GLUCOPHAGE) 1000 MG tablet Take 1 tablet (1,000 mg total) by mouth 2 (two) times a day with meals. 180 tablet 2     metoprolol succinate (TOPROL-XL) 25 MG TAKE 1 TABLET (25 MG TOTAL) BY MOUTH DAILY. 90 tablet 0     mupirocin (BACTROBAN) 2 % ointment Apply topically 2 (two) times a day. 22 g 1     OMEGA-3/DHA/EPA/FISH OIL (FISH OIL-OMEGA-3 FATTY ACIDS) 300-1,000 mg capsule Take 2 g by mouth daily.       ranitidine (ZANTAC) 150 MG tablet TAKE ONE TABLET BY MOUTH TWICE DAILY 180 tablet 3     SAW PALMETTO ORAL Take 1 capsule by mouth daily.       tamsulosin (FLOMAX) 0.4 mg cap Take 1 capsule (0.4 mg total) by mouth daily. 90 capsule 3     traZODone (DESYREL) 100 MG tablet Take 1 tablet (100 mg total) by mouth at bedtime. 90 tablet 3     white  petrolatum-mineral oil (ARTIFICIAL TEARS, CARISSA/MIN,) 83-15 % Oint Use 1cm ribbon on lower lids nightly and in the am. 3.5 g 12     No current facility-administered medications for this visit.

## 2021-06-03 VITALS
DIASTOLIC BLOOD PRESSURE: 62 MMHG | BODY MASS INDEX: 27.36 KG/M2 | HEART RATE: 63 BPM | WEIGHT: 202 LBS | RESPIRATION RATE: 14 BRPM | SYSTOLIC BLOOD PRESSURE: 126 MMHG | HEIGHT: 72 IN | OXYGEN SATURATION: 93 % | TEMPERATURE: 98 F

## 2021-06-03 NOTE — PROGRESS NOTES
Assessment and Plan:     1.  Annual wellness visit   Counseling done with the patient and wife, they do not have any updates for his current advanced directive.  Age-appropriate inspiratory guidance and counseling done.    2.  diabetes mellitus, type 2 (H)  Well-controlled.  Congratulated on his good control, continue plan with walking and medications and dietary modifications.  - Glycosylated Hemoglobin A1c  - Microalbumin, Random Urine    2. Mild episode of recurrent major depressive disorder (H)  Improving.  PHQ 9 score done today is 5.  Continue current plan and recheck again in 6 months.  Sooner if worsening or problems.    3. Peripheral vascular disease, unspecified (H)  Stable.  Continue medical management.    The patient's current medical problems were reviewed.    I have had an Advance Directives discussion with the patient.  The following health maintenance schedule was reviewed with the patient and provided in printed form in the after visit summary:   Health Maintenance   Topic Date Due     ZOSTER VACCINES (2 of 3) 02/29/2008     DIABETES URINE MICROALBUMIN  09/06/2014     MEDICARE ANNUAL WELLNESS VISIT  11/01/2018     DIABETES OPHTHALMOLOGY EXAM  06/25/2019     INFLUENZA VACCINE RULE BASED (1) 08/01/2019     DIABETES HEMOGLOBIN A1C  08/13/2019     DIABETES FOLLOW-UP  08/13/2019     DIABETES FOOT EXAM  02/13/2020     FALL RISK ASSESSMENT  02/13/2020     ADVANCE CARE PLANNING  11/01/2022     TD 18+ HE  09/06/2023     PNEUMOCOCCAL IMMUNIZATION 65+ HIGH/HIGHEST RISK  Completed     DEPRESSION FOLLOW UP  Discontinued        Subjective:   Chief Complaint: Nestor Peterson is an 79 y.o. male here for an Annual Wellness visit.   HPI: 79-year-old male here for an annual wellness visit.  He does not have any other concerns.  Since last visit, he is been sleeping much better with the increased dose of trazodone.  Blood sugars have been well controlled.  No hypoglycemia.    Review of Systems: No chest pain, no  shortness of breath, no blood in the urine, no blood in the stool, no major changes or progressions in his underlying memory loss or behaviors.  Please see above.  The rest of the review of systems are negative for all systems.    Patient Care Team:  Francisco Javier Rojas MD as PCP - General (Family Medicine)  Francisco Javier Rojas MD as Assigned PCP  Janette Honeycutt, PharmD as Pharmacist (Pharmacist)  Jacqueline Pugh, PharmD as Pharmacist (Pharmacist)     Patient Active Problem List   Diagnosis     Male Erectile Disorder     Cerumen Impaction In The Right Ear     Tinea Cruris     Tinea Pedis     Obesity     Left Ventricular Hypertrophy     Constipation     Benign prostatic hyperplasia with post-void dribbling     Seasonal allergic rhinitis due to pollen     Abnormal Electrocardiogram     Memory Lapses Or Loss     Aortic Stenosis     Type II diabetes mellitus (H)     Chronic Major Depression     Chronic Kidney Disease, Stage 3     Acute Sinusitis     Depression     Intermittent Claudication     Benign Adenomatous Polyp Of The Large Intestine     Lumbar Radiculopathy     Esophageal Reflux     Benign Essential Hypertension     Insomnia     Other hyperlipidemia     Localized Osteoarthritis Of The Knee     Lumbar Disc Degeneration     Vitamin D Deficiency     Actinic Keratosis     S/P AVR     Neurocognitive disorder     Coronary artery disease     Lewy body disease (H)     Dementia (H)     Primary osteoarthritis involving multiple joints     Past Medical History:   Diagnosis Date     Aortic stenosis      BPH (benign prostatic hyperplasia)      Chronic back pain      Chronic kidney disease      Degenerative joint disease      Dementia (H)      Depression      Diabetes mellitus (H)     type 2     Esophageal reflux      Fatigue      Hyperlipidemia      Hypertension      Left ventricular hypertrophy      Lumbar radiculopathy      Male erectile disorder      Obesity      ZAKIA on CPAP     machine broke at present     Short-term  memory loss       Past Surgical History:   Procedure Laterality Date     APPENDECTOMY       CARDIAC CATHETERIZATION  6/26/14     CATARACT EXTRACTION Bilateral      CHOLECYSTECTOMY       VA APPENDECTOMY      Description: Appendectomy;  Recorded: 12/17/2009;     VA KNEE SCOPE,DIAGNOSTIC      Description: Arthroscopy Knee Left;  Proc Date: 08/12/2010;  Comments: Dr Daily     VA REMOVAL GALLBLADDER      Description: Cholecystectomy;  Recorded: 12/17/2009;      Family History   Problem Relation Age of Onset     Diabetes Brother       Social History     Socioeconomic History     Marital status:      Spouse name: Not on file     Number of children: Not on file     Years of education: Not on file     Highest education level: Not on file   Occupational History     Not on file   Social Needs     Financial resource strain: Not on file     Food insecurity:     Worry: Not on file     Inability: Not on file     Transportation needs:     Medical: Not on file     Non-medical: Not on file   Tobacco Use     Smoking status: Never Smoker     Smokeless tobacco: Never Used   Substance and Sexual Activity     Alcohol use: No     Drug use: No     Sexual activity: Never   Lifestyle     Physical activity:     Days per week: Not on file     Minutes per session: Not on file     Stress: Not on file   Relationships     Social connections:     Talks on phone: Not on file     Gets together: Not on file     Attends Tenriism service: Not on file     Active member of club or organization: Not on file     Attends meetings of clubs or organizations: Not on file     Relationship status: Not on file     Intimate partner violence:     Fear of current or ex partner: Not on file     Emotionally abused: Not on file     Physically abused: Not on file     Forced sexual activity: Not on file   Other Topics Concern     Not on file   Social History Narrative     Not on file      Current Outpatient Medications   Medication Sig Dispense Refill     ACCU-CHEK  DAISHA PLUS TEST STRP strips USE TO TEST BLOOD SUGAR ONCE A DAY AS DIRECTED 100 strip 0     amoxicillin (AMOXIL) 500 MG capsule TAKE 4 CAPSULES BY MOUTH 1 HOUR BEFORE DENTAL APPOINTMENT 4 capsule 1     aspirin 81 MG tablet Take 81 mg by mouth daily.       cetirizine (ZYRTEC) 10 MG tablet Take 10 mg by mouth daily as needed for allergies.        cholecalciferol, vitamin D3, 1,000 unit tablet Take 1,000 Units by mouth daily.       donepezil (ARICEPT) 10 MG tablet TAKE 1 TABLET BY MOUTH EVERY DAY 90 tablet 3     donepezil (ARICEPT) 5 MG tablet TAKE 1 TABLET BY MOUTH EVERYDAY AT BEDTIME. DUE FOR AN APPOINTMENT. 90 tablet 3     fenofibrate (TRIGLIDE) 160 MG tablet TAKE 1 TABLET (160 MG TOTAL) BY MOUTH DAILY. 90 tablet 0     finasteride (PROSCAR) 5 mg tablet TAKE 1 TABLET BY MOUTH EVERY DAY 90 tablet 3     FLUoxetine (PROZAC) 10 MG capsule TAKE 1 CAPSULE BY MOUTH EVERY DAY 90 capsule 2     fluticasone (FLONASE) 50 mcg/actuation nasal spray 1 spray into each nostril 2 times a day at 6:00 am and 4:00 pm. 16 g 6     FOLIC ACID/MULTIVIT-MIN/LUTEIN (CENTRUM SILVER ORAL) Take 1 tablet by mouth daily.       ginseng 100 mg cap Take 1 tablet by mouth daily.       glipiZIDE (GLUCOTROL XL) 2.5 MG 24 hr tablet TAKE 1 TABLET (2.5 MG TOTAL) BY MOUTH DAILY. 90 tablet 3     HYDROcodone-acetaminophen 5-325 mg per tablet Take 1 tablet by mouth every 6 (six) hours as needed for pain. 60 tablet 0     LANCETS MISC Use As Directed.       LYRICA 50 mg capsule TAKE 1 CAPSULE (50 MG) BY MOUTH THREE TIMES DAILY 90 capsule 2     metFORMIN (GLUCOPHAGE) 1000 MG tablet Take 1 tablet (1,000 mg total) by mouth 2 (two) times a day with meals. 180 tablet 2     metoprolol succinate (TOPROL-XL) 25 MG TAKE 1 TABLET (25 MG TOTAL) BY MOUTH DAILY. 90 tablet 0     mupirocin (BACTROBAN) 2 % ointment Apply topically 2 (two) times a day. 22 g 1     ranitidine (ZANTAC) 150 MG tablet TAKE ONE TABLET BY MOUTH TWICE DAILY 180 tablet 3     SAW PALMETTO ORAL Take 1  "capsule by mouth daily.       tamsulosin (FLOMAX) 0.4 mg cap Take 1 capsule (0.4 mg total) by mouth daily. 90 capsule 3     traZODone (DESYREL) 100 MG tablet Take 1 tablet (100 mg total) by mouth at bedtime. 90 tablet 3     white petrolatum-mineral oil (ARTIFICIAL TEARS, CARISSA/MIN,) 83-15 % Oint Use 1cm ribbon on lower lids nightly and in the am. 3.5 g 12     No current facility-administered medications for this visit.       Objective:   Vital Signs:   Visit Vitals  /62 (Patient Site: Right Arm)   Pulse 63   Temp 98  F (36.7  C) (Oral)   Resp 14   Ht 5' 11.75\" (1.822 m)   Wt 202 lb (91.6 kg)   SpO2 93%   BMI 27.59 kg/m         VisionScreening:  No exam data present     PHYSICAL EXAM  Gen - alert, orientated, NAD  Eyes - fundascopic exam limited by the undialated pupil but looks symmetric  ENT - oropharynx clear, left external auditory canal and tympanic membrane appear normal.  Right side shows wax occlusion which was treated today with irrigation.  Neck - supple, no palpable mass or lymphadenopathy  CV - RRR, no murmur  Resp - lungs CTA  Ab - soft, nontender, no palpable mass or organomegaly  Extrem - warm, no edema  Neuro - CN II-XII intact, strength, sensation, reflexes intact and symmetric  Skin - no rash, no atypical appearing lesions seen.       Assessment Results 11/4/2019   Activities of Daily Living No help needed   Instrumental Activities of Daily Living No help needed   Mini Cog Total Score 5   Some recent data might be hidden     A Mini-Cog score of 0-2 suggests the possibility of dementia, score of 3-5 suggests no dementia    Identified Health Risks:     He is at risk for lack of exercise and has been provided with information to increase physical activity for the benefit of his well-being.  The patient was counseled and encouraged to consider modifying their diet and eating habits. He was provided with information on recommended healthy diet options.  The patient was provided with written " information regarding signs of hearing loss.  Information on urinary incontinence and treatment options given to patient.  The patient's PHQ-9 score is consistent with mild depression. He was provided with information regarding depression and was advised to schedule a follow up appointment in (patient has no open but improving mild depression)weeks to further address this issue.  Routine follow-up in 6 months, sooner if problems or worsening.  Patient's advanced directive was discussed and I am comfortable with the patient's wishes.

## 2021-06-04 VITALS
HEART RATE: 60 BPM | RESPIRATION RATE: 16 BRPM | HEIGHT: 71 IN | WEIGHT: 205 LBS | SYSTOLIC BLOOD PRESSURE: 128 MMHG | DIASTOLIC BLOOD PRESSURE: 68 MMHG | BODY MASS INDEX: 28.7 KG/M2

## 2021-06-04 NOTE — TELEPHONE ENCOUNTER
Refill Approved    Rx renewed per Medication Renewal Policy. Medication was last renewed on 9/23/19 .    Lucy Zamudio, [facility] Triage/Med Refill 12/24/2019     Requested Prescriptions   Pending Prescriptions Disp Refills     ACCU-CHEK DAISHA PLUS TEST STRP strips [Pharmacy Med Name: ACCU-CHEK DAISHA PLUS TEST STRP] 100 strip 0     Sig: USE TO TEST BLOOD SUGAR ONCE A DAY AS DIRECTED       Diabetic Supplies Refill Protocol Passed - 12/22/2019 10:15 AM        Passed - Visit with PCP or prescribing provider visit in last 6 months     Last office visit with prescriber/PCP: 2/13/2019 Francisco Javier Rojas MD OR same dept: 2/13/2019 Francisco Javier Rojas MD OR same specialty: 2/13/2019 Francisco Javier Rojas MD  Last physical: 11/4/2019 Last MTM visit: Visit date not found   Next visit within 3 mo: Visit date not found  Next physical within 3 mo: Visit date not found  Prescriber OR PCP: Francisco Javier Rojas MD  Last diagnosis associated with med order: 1. Type 2 diabetes mellitus (H)  - ACCU-CHEK DAISHA PLUS TEST STRP strips [Pharmacy Med Name: ACCU-CHEK DAISHA PLUS TEST STRP]; USE TO TEST BLOOD SUGAR ONCE A DAY AS DIRECTED  Dispense: 100 strip; Refill: 0    If protocol passes may refill for 12 months if within 3 months of last provider visit (or a total of 15 months).             Passed - A1C in last 6 months     Hemoglobin A1c   Date Value Ref Range Status   11/04/2019 6.7 (H) 3.5 - 6.0 % Final

## 2021-06-05 VITALS
OXYGEN SATURATION: 94 % | TEMPERATURE: 98.3 F | DIASTOLIC BLOOD PRESSURE: 70 MMHG | BODY MASS INDEX: 27.44 KG/M2 | WEIGHT: 196 LBS | HEART RATE: 69 BPM | HEIGHT: 71 IN | SYSTOLIC BLOOD PRESSURE: 120 MMHG | RESPIRATION RATE: 18 BRPM

## 2021-06-05 VITALS
WEIGHT: 198 LBS | DIASTOLIC BLOOD PRESSURE: 60 MMHG | BODY MASS INDEX: 28.35 KG/M2 | HEART RATE: 60 BPM | HEIGHT: 70 IN | TEMPERATURE: 98.6 F | RESPIRATION RATE: 20 BRPM | SYSTOLIC BLOOD PRESSURE: 118 MMHG

## 2021-06-05 VITALS
SYSTOLIC BLOOD PRESSURE: 126 MMHG | WEIGHT: 201 LBS | BODY MASS INDEX: 28.77 KG/M2 | HEART RATE: 62 BPM | OXYGEN SATURATION: 95 % | DIASTOLIC BLOOD PRESSURE: 56 MMHG | TEMPERATURE: 97.7 F | HEIGHT: 70 IN

## 2021-06-05 VITALS
WEIGHT: 200 LBS | SYSTOLIC BLOOD PRESSURE: 132 MMHG | OXYGEN SATURATION: 97 % | RESPIRATION RATE: 20 BRPM | BODY MASS INDEX: 28.7 KG/M2 | DIASTOLIC BLOOD PRESSURE: 68 MMHG | HEART RATE: 57 BPM

## 2021-06-05 VITALS — WEIGHT: 200 LBS | BODY MASS INDEX: 28.63 KG/M2 | HEIGHT: 70 IN

## 2021-06-05 NOTE — TELEPHONE ENCOUNTER
Refill Approved    Rx renewed per Medication Renewal Policy. Medication was last renewed on 4/18/19.    Judi Raymundo, [facility] Triage/Med Refill 1/18/2020     Requested Prescriptions   Pending Prescriptions Disp Refills     metFORMIN (GLUCOPHAGE) 1000 MG tablet [Pharmacy Med Name: METFORMIN HCL 1,000 MG TABLET] 180 tablet 2     Sig: TAKE 1 TABLET BY MOUTH TWICE A DAY WITH MEALS       Metformin Refill Protocol Passed - 1/17/2020  1:56 AM        Passed - Blood pressure in last 12 months     BP Readings from Last 1 Encounters:   01/14/20 128/68             Passed - LFT or AST or ALT in last 12 months     Albumin   Date Value Ref Range Status   01/14/2020 3.9 3.5 - 5.0 g/dL Final     Bilirubin, Total   Date Value Ref Range Status   01/14/2020 0.5 0.0 - 1.0 mg/dL Final     Alkaline Phosphatase   Date Value Ref Range Status   01/14/2020 52 45 - 120 U/L Final     AST   Date Value Ref Range Status   01/14/2020 21 0 - 40 U/L Final     ALT   Date Value Ref Range Status   01/14/2020 18 0 - 45 U/L Final     Protein, Total   Date Value Ref Range Status   01/14/2020 7.2 6.0 - 8.0 g/dL Final                Passed - GFR or Serum Creatinine in last 6 months     GFR MDRD Non Af Amer   Date Value Ref Range Status   01/14/2020 59 (L) >60 mL/min/1.73m2 Final     GFR MDRD Af Amer   Date Value Ref Range Status   01/14/2020 >60 >60 mL/min/1.73m2 Final             Passed - Visit with PCP or prescribing provider visit in last 6 months or next 3 months     Last office visit with prescriber/PCP: Visit date not found OR same dept: 2/13/2019 Francisco Javier Rojas MD OR same specialty: 2/13/2019 Francisco Javier Rojas MD Last physical: 11/4/2019 Last MTM visit: Visit date not found         Next appt within 3 mo: Visit date not found  Next physical within 3 mo: Visit date not found  Prescriber OR PCP: Francisco Javier Rojas MD  Last diagnosis associated with med order: 1. DM2 (diabetes mellitus, type 2) (H)  - metFORMIN (GLUCOPHAGE) 1000 MG tablet [Pharmacy  Med Name: METFORMIN HCL 1,000 MG TABLET]; TAKE 1 TABLET BY MOUTH TWICE A DAY WITH MEALS  Dispense: 180 tablet; Refill: 2     If protocol passes may refill for 12 months if within 3 months of last provider visit (or a total of 15 months).           Passed - A1C in last 6 months     Hemoglobin A1c   Date Value Ref Range Status   11/04/2019 6.7 (H) 3.5 - 6.0 % Final               Passed - Microalbumin in last year      Microalbumin, Random Urine   Date Value Ref Range Status   11/04/2019 5.13 (H) 0.00 - 1.99 mg/dL Final

## 2021-06-07 NOTE — TELEPHONE ENCOUNTER
Refill Approved    Rx renewed per Medication Renewal Policy. Medication was last renewed on 5/10/19.    Judi Raymundo, [facility] Triage/Med Refill 4/24/2020     Requested Prescriptions   Pending Prescriptions Disp Refills     tamsulosin (FLOMAX) 0.4 mg cap [Pharmacy Med Name: TAMSULOSIN HCL 0.4 MG CAPSULE] 90 capsule 3     Sig: TAKE 1 CAPSULE BY MOUTH EVERY DAY       Alfuzosin/Tamsulosin/Silodosin Refill Protocol  Passed - 4/24/2020 12:19 AM        Passed - PCP or prescribing provider visit in past 12 months       Last office visit with prescriber/PCP: 2/13/2019 Francisco Javier Rojas MD OR same dept: Visit date not found OR same specialty: 2/13/2019 Francisco Javier Rojas MD  Last physical: 11/4/2019 Last MTM visit: Visit date not found   Next visit within 3 mo: Visit date not found  Next physical within 3 mo: Visit date not found  Prescriber OR PCP: Francisco Javier Rojas MD  Last diagnosis associated with med order: 1. BPH (benign prostatic hyperplasia)  - tamsulosin (FLOMAX) 0.4 mg cap [Pharmacy Med Name: TAMSULOSIN HCL 0.4 MG CAPSULE]; TAKE 1 CAPSULE BY MOUTH EVERY DAY  Dispense: 90 capsule; Refill: 3    If protocol passes may refill for 12 months if within 3 months of last provider visit (or a total of 15 months).

## 2021-06-07 NOTE — TELEPHONE ENCOUNTER
RN cannot approve Refill Request    RN can NOT refill this medication patient reported taking differently (PRN) . Last office visit: 2/13/2019 Francisco Javier Rojas MD Last Physical: 11/4/2019 Last MTM visit: Visit date not found Last visit same specialty: 2/13/2019 Francisco Javier Rojas MD.  Next visit within 3 mo: Visit date not found  Next physical within 3 mo: Visit date not found      Janette Gongora, [facility] Triage/Med Refill 4/2/2020    Requested Prescriptions   Pending Prescriptions Disp Refills     FLUoxetine (PROZAC) 10 MG capsule [Pharmacy Med Name: FLUOXETINE HCL 10 MG CAPSULE] 90 capsule 2     Sig: TAKE 1 CAPSULE BY MOUTH EVERY DAY       [facility] Refill Protocol  Passed - 4/1/2020  1:32 AM        Passed - PCP or prescribing provider visit in last year     Last office visit with prescriber/PCP: 2/13/2019 Francisco Javier Rojas MD OR same dept: Visit date not found OR same specialty: 2/13/2019 Francisco Javier Rojas MD  Last physical: 11/4/2019 Last MTM visit: Visit date not found   Next visit within 3 mo: Visit date not found  Next physical within 3 mo: Visit date not found  Prescriber OR PCP: Francisco Javier Rojas MD  Last diagnosis associated with med order: 1. Moderate episode of recurrent major depressive disorder (H)  - FLUoxetine (PROZAC) 10 MG capsule [Pharmacy Med Name: FLUOXETINE HCL 10 MG CAPSULE]; TAKE 1 CAPSULE BY MOUTH EVERY DAY  Dispense: 90 capsule; Refill: 2    If protocol passes may refill for 12 months if within 3 months of last provider visit (or a total of 15 months).

## 2021-06-07 NOTE — TELEPHONE ENCOUNTER
Refill Approved    Rx renewed per Medication Renewal Policy. Medication was last renewed on 3/14/19.    Judi Raymundo, [facility] Triage/Med Refill 4/16/2020     Requested Prescriptions   Pending Prescriptions Disp Refills     ranitidine (ZANTAC) 150 MG tablet [Pharmacy Med Name: RANITIDINE 150 MG TABLET] 180 tablet 3     Sig: TAKE ONE TABLET BY MOUTH TWICE DAILY       GI Medications Refill Protocol Passed - 4/16/2020 12:22 AM        Passed - PCP or prescribing provider visit in last 12 or next 3 months.     Last office visit with prescriber/PCP: 2/13/2019 Francisco Javier Rojas MD OR same dept: Visit date not found OR same specialty: 2/13/2019 Francisco Javier Rojas MD  Last physical: 11/4/2019 Last MTM visit: Visit date not found   Next visit within 3 mo: Visit date not found  Next physical within 3 mo: Visit date not found  Prescriber OR PCP: Francisco Javier Rojas MD  Last diagnosis associated with med order: 1. Esophageal reflux  - ranitidine (ZANTAC) 150 MG tablet [Pharmacy Med Name: RANITIDINE 150 MG TABLET]; TAKE ONE TABLET BY MOUTH TWICE DAILY  Dispense: 180 tablet; Refill: 3    If protocol passes may refill for 12 months if within 3 months of last provider visit (or a total of 15 months).

## 2021-06-07 NOTE — TELEPHONE ENCOUNTER
No applicable notes in chart. Last fill below, but need Famotidine order.     ranitidine (ZANTAC) 150 MG tablet  180 tablet  2  4/16/2020   No    Sig: TAKE ONE TABLET BY MOUTH TWICE DAILY    Sent to pharmacy as: raNITIdine 150 mg tablet (ZANTAC) on recall   E-Prescribing Status: Receipt confirmed by pharmacy (4/16/2020  5:41 PM CDT)

## 2021-06-07 NOTE — TELEPHONE ENCOUNTER
Famotidine not recommended because of his age and memory loss.   Ok to wait for ranatidine - can check other pharmacies too.

## 2021-06-07 NOTE — TELEPHONE ENCOUNTER
Drug Change Request  Who is calling?:  [facility]  What is the current medication?:  Ranitidine  What alternative is being requested?: Famotidine  Why the request to change?:  Recall on ranitidine  Requested Pharmacy?: [facility]  Okay to leave a detailed message?:  No

## 2021-06-08 NOTE — PROGRESS NOTES
ASSESMENT AND PLAN:  Diagnoses and all orders for this visit:    Coronary artery disease  Currently asymptomatic.  Medical management.  Medication review and counseling done to the patient and his wife.  Restart simvastatin as prescribed below.  -     simvastatin (ZOCOR) 40 MG tablet; Take 1 tablet (40 mg total) by mouth bedtime.  Dispense: 90 tablet; Refill: 3    Lewy body dementia with behavioral disturbance  Detailed counseling done today with the patient and his wife.  We discussed some strategies for increasing engagement in daily life and getting him out of the house.  We talked about the possibility of a day program with physical and mental activities for the patient.  We also talked about monitoring his striving for safety and the possibility of doing a formal driving assessment at [place].  We also talked about medication adjustments, the wife had wondered about fluoxetine as discussed below.  At this time, the patient and his wife want to wait to discuss any medication changes with Dr Watson at their appointment coming up next month.    Lumbar Disc Degeneration  -     oxyCODONE-acetaminophen (PERCOCET) 5-325 mg per tablet; Take 1 tablet by mouth 2 (two) times a day.  Dispense: 60 tablet; Refill: 0  Reviewed the risks and benefits of the medication with the patient, reviewed its appropriate use and that 60 tablet seems to last 1 month.  Early refill done today because we verified with the pharmacy that the previous film was a partial fill because the pharmacy was low on supply they were only able to give 35 tablets instead of 60.        SUBJECTIVE: 76-year-old male comes in today with his wife with several concerns.  Patient continues to get pain across his low back.  It is worse with bending and positional changes and is moderate in severity but at times becomes more severe for which the patient uses oxycodone acetaminophen twice per day.  He gets good relief of the severe pain with this medication.  The  patient had some left groin pain over the last 2 weeks it has been on and off.  He has not noticed any associated testicular or scrotal swelling.  The pain comes and goes and seems to be improving and is actually not present at all today.  He cannot think of any specific injury.  His wife is concerned about the progression of his dementia.  He gets really frustrated and seems upset frequently.  Patient reports that his frustration is mainly around feel like he is trapped in his house and the only thing he can do is watch TV or read a book.  The wife thinks he might benefit from fluoxetine because this worked well for her.  On questioning of the patient today, he does not endorse any depressive symptoms.  He reports he is able to laugh and look forward to things and try things in his life.  He doesn't feel like there is much of a problem.    No chest pain, no shortness of breath, no daytime sedation, no fevers, no pain with urination.    Past Medical History   Diagnosis Date     Aortic stenosis      BPH (benign prostatic hyperplasia)      Chronic back pain      Chronic kidney disease      Degenerative joint disease      Dementia      Depression      Diabetes mellitus      type 2     Esophageal reflux      Fatigue      Hyperlipidemia      Hypertension      Left ventricular hypertrophy      Lumbar radiculopathy      Male erectile disorder      Obesity      ZAKIA on CPAP      machine broke at present     Short-term memory loss      Patient Active Problem List   Diagnosis     Male Erectile Disorder     Cerumen Impaction In The Right Ear     Tinea Cruris     Tinea Pedis     Obesity     Left Ventricular Hypertrophy     Constipation     Benign Prostatic Hypertrophy     Allergies     Abnormal Electrocardiogram     Memory Lapses Or Loss     Aortic Stenosis     Type 2 Diabetes Mellitus     Chronic Major Depression     Chronic Kidney Disease, Stage 3     Acute Sinusitis     Depression     Intermittent Claudication     Benign  Adenomatous Polyp Of The Large Intestine     Lumbar Radiculopathy     Esophageal Reflux     Benign Essential Hypertension     Insomnia     Hyperlipidemia     Localized Osteoarthritis Of The Knee     Lumbar Disc Degeneration     Vitamin D Deficiency     Actinic Keratosis     S/P AVR     Neurocognitive disorder     Coronary artery disease     Lewy body disease     Current Outpatient Prescriptions   Medication Sig Dispense Refill     acetaminophen 500 mg TbEF Take 1-2 tablets by mouth every 6 (six) hours as needed.       amoxicillin (AMOXIL) 500 MG capsule take 4 capsules by mouth 1 hour before dental appointment 4 capsule 1     ascorbic acid (ASCORBIC ACID WITH LISBET HIPS) 500 MG tablet Take 500 mg by mouth daily.       aspirin 81 MG tablet Take 81 mg by mouth daily.       blood glucose test (ACCU-CHEK DAISHA PLUS TEST STRP) strips Test once daily. 100 strip 11     buPROPion (WELLBUTRIN XL) 150 MG 24 hr tablet Take 1 tablet (150 mg total) by mouth daily. 30 tablet 11     cetirizine (ZYRTEC) 10 MG tablet Take 10 mg by mouth daily as needed for allergies.        cholecalciferol, vitamin D3, 1,000 unit tablet Take 1,000 Units by mouth daily.       diclofenac (VOLTAREN) 75 MG EC tablet Take 75 mg by mouth daily.        donepezil (ARICEPT) 10 MG tablet TAKE ONE TABLET BY MOUTH NIGHTLY AT BEDTIME 30 tablet 3     donepezil (ARICEPT) 5 MG tablet Take 1 tablet (5 mg total) by mouth bedtime. 30 tablet 11     fenofibrate (TRIGLIDE) 160 MG tablet Take 1 tablet (160 mg total) by mouth daily. 90 tablet 1     fluticasone (FLONASE) 50 mcg/actuation nasal spray 1 spray into each nostril 2 times a day at 6:00 am and 4:00 pm. 16 g 6     FOLIC ACID/MULTIVIT-MIN/LUTEIN (CENTRUM SILVER ORAL) Take 1 tablet by mouth daily.       FOLIC ACID/MV,FE,OTHER MIN (CENTRUM ORAL) Take 1 tablet by mouth daily.       ginseng 100 mg cap Take 1 tablet by mouth daily.       glipiZIDE (GLUCOTROL) 2.5 MG 24 hr tablet TAKE ONE TABLET BY MOUTH ONE TIME DAILY 90  tablet 3     glipiZIDE (GLUCOTROL) 2.5 MG 24 hr tablet TAKE ONE TABLET BY MOUTH ONE TIME DAILY  30 tablet 5     LANCETS MISC Use As Directed.       magnesium oxide 500 mg Tab Take 500 mg by mouth daily.       metFORMIN (GLUCOPHAGE) 1000 MG tablet TAKE ONE TABLET BY MOUTH TWICE DAILY WITH MEALS 60 tablet 9     metoprolol succinate (TOPROL-XL) 25 MG Take 1 tablet (25 mg total) by mouth daily. 90 tablet 2     mupirocin (BACTROBAN) 2 % ointment Apply topically 2 (two) times a day. 22 g 1     OMEGA-3/DHA/EPA/FISH OIL (FISH OIL-OMEGA-3 FATTY ACIDS) 300-1,000 mg capsule Take 2 g by mouth daily.       oxyCODONE-acetaminophen (PERCOCET) 5-325 mg per tablet Take 1 tablet by mouth 2 (two) times a day. 60 tablet 0     pregabalin (LYRICA) 50 MG capsule Take 1 capsule (50 mg total) by mouth 3 (three) times a day. 90 capsule 2     promethazine-dextromethorphan (PROMETHAZINE-DM) 6.25-15 mg/5 mL syrup Take 5 mL by mouth every 6 (six) hours as needed for cough. 118 mL 0     ranitidine (ZANTAC) 150 MG tablet TAKE ONE TABLET BY MOUTH TWICE DAILY  180 tablet 3     SAW PALMETTO ORAL Take 1 capsule by mouth daily.       simvastatin (ZOCOR) 40 MG tablet Take 1 tablet (40 mg total) by mouth bedtime. 90 tablet 3     tamsulosin (FLOMAX) 0.4 mg Cp24 Take 1 capsule (0.4 mg total) by mouth daily. 90 capsule 3     traZODone (DESYREL) 150 MG tablet TAKE ONE TABLET BY MOUTH NIGHTLY AT BEDTIME 90 tablet 1     white petrolatum-mineral oil (ARTIFICIAL TEARS, CARISSA/MIN,) 83-15 % Oint Use 1cm ribbon on lower lids nightly and in the am. 3.5 g 12     mirtazapine (REMERON) 15 MG tablet Take 1 tablet (15 mg total) by mouth bedtime. 30 tablet 5     mirtazapine (REMERON) 15 MG tablet Take 1 tablet (15 mg total) by mouth bedtime. 30 tablet 0     No current facility-administered medications for this visit.      History   Smoking Status     Never Smoker   Smokeless Tobacco     Never Used       OBJECTICE:   Visit Vitals     /52 (Patient Site: Right Arm,  "Patient Position: Sitting, Cuff Size: Adult Large)     Pulse 72     Temp 97.7  F (36.5  C) (Oral)     Resp 16     Ht 5' 11.5\" (1.816 m)     Wt 217 lb 4 oz (98.5 kg)     BMI 29.88 kg/m2        No results found for this or any previous visit (from the past 24 hour(s)).     GEN-alert, appropriate, in no apparent distress   Genitourinary-normal testicular exam bilaterally, no hernia, normal appearance to the external genitalia.   CV-regular rate and rhythm   RESP-lungs clear to auscultation   ABDOMINAL-soft and nontender to palpation   EXTREM-warm with no edema   Psychiatric-appearance is well groomed, speech was normal fluency and rate, mood is not depressed, affect is normal, thought content negative for suicidal or homicidal ideation, thought processing negative for paranoid or delusional thinking, judgment and insight slightly limited by his dementia.      Francisco Javier Rojas          "

## 2021-06-11 NOTE — TELEPHONE ENCOUNTER
Refill Approved    Rx renewed per Medication Renewal Policy. Medication was last renewed on 9/24/19, last OV 11/4/19.    Ania Treadwell, [facility] Triage/Med Refill 9/20/2020     Requested Prescriptions   Pending Prescriptions Disp Refills     donepeziL (ARICEPT) 5 MG tablet [Pharmacy Med Name: DONEPEZIL HCL 5 MG TABLET] 90 tablet 3     Sig: TAKE 1 TABLET BY MOUTH EVERYDAY AT BEDTIME. DUE FOR AN APPOINTMENT.       Cholinesterase Inhibitor/Anti-Alzheimer Agent Refill Protocol Failed - 9/20/2020  2:51 AM        Failed - Visit with PCP or prescribing provider visit in last 6 months or next 3 months     Last office visit with prescriber/PCP: Visit date not found OR same dept: Visit date not found OR same specialty: 2/13/2019 Francisco Javier Rojas MD Last physical: Visit date not found Last MTM visit: Visit date not found     Next appt within 3 mo: Visit date not found  Next physical within 3 mo: Visit date not found  Prescriber OR PCP: Francisco Javier Rojas MD  Last diagnosis associated with med order: 1. Lewy body dementia (H)  - donepeziL (ARICEPT) 5 MG tablet [Pharmacy Med Name: DONEPEZIL HCL 5 MG TABLET]; TAKE 1 TABLET BY MOUTH EVERYDAY AT BEDTIME. DUE FOR AN APPOINTMENT.  Dispense: 90 tablet; Refill: 3    If protocol passes may refill for 6 months if within 3 months of last provider visit (or a total of 9 months).

## 2021-06-11 NOTE — PROGRESS NOTES
ASSESMENT AND PLAN:  Diagnoses and all orders for this visit:    Dementia  Progressive.  Likely Alzheimer's type.  The patient and his wife do not want to follow-up with the dementia clinic, they prefer to follow-up here at his primary care clinic.  I did  the wife on options for support for herself and her family and she was given the phone number for the [employer] as well as [facility] mental health to consider individual counseling.  For the patient, will continue his current medications, I did review his most recent consultation from the dementia specialist and will continue with his current dosing of Aricept 10 mg in the morning and 5 mg in the evening.  We also talked about his long-term planning including living situation and CODE STATUS and the wife is going to lead further consideration of this with the family and let me know about any changes they decide on.    Chronic Major Depression located by his underlying dementia  Given the worsening as described below, we are going to use a new medication.  We reviewed the risks and benefits of fluoxetine as prescribed below.  Follow-up either in person or by phone in 4-6 weeks for reevaluation, sooner if problems.  Mirtazapine is being discontinued.  -     FLUoxetine (PROZAC) 10 MG capsule; Take 1 capsule (10 mg total) by mouth daily.  Dispense: 30 capsule; Refill: 6    Insomnia  -     traZODone (DESYREL) 150 MG tablet; Take 0.5 tablets (75 mg total) by mouth at bedtime.  Dispense: 90 tablet; Refill: 1    Lumbar Disc Degeneration  He has used this medication for breakthrough pain successfully in the past and has good control of his pain overall using this medication, we did review the risks and benefits of the medication.  -     HYDROcodone-acetaminophen 5-325 mg per tablet; Take 1 tablet by mouth every 6 (six) hours as needed for pain.  Dispense: 60 tablet; Refill: 0    Coronary artery disease  Comanagement.  Continue current treatments.  -      Comprehensive Metabolic Panel    Type II diabetes mellitus  -     Glycosylated Hemoglobin A1c done today comes back showing good control.  Continue current treatments and recheck again in 6 months.          SUBJECTIVE: 77-year-old male comes in today with his wife.  He is here for follow-up on his heart disease and diabetes and chronic back pain.  These things have all been stable.  He does have daily pain in his low back, does not radiate down below the knees, worse with twisting and bending motions and improved with his medication.  What has been worsening slowly over time has been the patient's memory and function at home.  He lives at home with his wife.  He has a known history of dementia which has been slowly progressive.  He takes Aricept and mirtazapine.  For sleep he takes trazodone which gives him good results, he does not sleep well if he does not take the trazodone.  Nighttime can be difficult for the patient and his wife if he does not sleep well.  He can become confused and agitated at night at times.  There is been no sudden change in his behavior but slowly over time over this last several months his wife has noticed increased irritability and low mood and occasional outbursts of anger.  The patient and family have also noticed slow continued worsening of his short-term memory function.    Review of systems: No chest pain, no shortness of breath, no fever, no vomiting, remainder of review systems as above or negative or unable to complete because of mental status.    Past Medical History:   Diagnosis Date     Aortic stenosis      BPH (benign prostatic hyperplasia)      Chronic back pain      Chronic kidney disease      Degenerative joint disease      Dementia      Depression      Diabetes mellitus     type 2     Esophageal reflux      Fatigue      Hyperlipidemia      Hypertension      Left ventricular hypertrophy      Lumbar radiculopathy      Male erectile disorder      Obesity      ZAKIA on CPAP      machine broke at present     Short-term memory loss      Patient Active Problem List   Diagnosis     Male Erectile Disorder     Cerumen Impaction In The Right Ear     Tinea Cruris     Tinea Pedis     Obesity     Left Ventricular Hypertrophy     Constipation     Benign Prostatic Hypertrophy     Allergies     Abnormal Electrocardiogram     Memory Lapses Or Loss     Aortic Stenosis     Type II diabetes mellitus     Chronic Major Depression     Chronic Kidney Disease, Stage 3     Acute Sinusitis     Depression     Intermittent Claudication     Benign Adenomatous Polyp Of The Large Intestine     Lumbar Radiculopathy     Esophageal Reflux     Benign Essential Hypertension     Insomnia     Hyperlipidemia     Localized Osteoarthritis Of The Knee     Lumbar Disc Degeneration     Vitamin D Deficiency     Actinic Keratosis     S/P AVR     Neurocognitive disorder     Coronary artery disease     Lewy body disease     Dementia     Current Outpatient Prescriptions   Medication Sig Dispense Refill     acetaminophen 500 mg TbEF Take 1-2 tablets by mouth every 6 (six) hours as needed.       amoxicillin (AMOXIL) 500 MG capsule take 4 capsules by mouth 1 hour before dental appointment 4 capsule 1     ascorbic acid (ASCORBIC ACID WITH LISBET HIPS) 500 MG tablet Take 500 mg by mouth daily.       aspirin 81 MG tablet Take 81 mg by mouth daily.       blood glucose test (ACCU-CHEK DAISHA PLUS TEST STRP) strips Test once daily. 100 strip 11     buPROPion (WELLBUTRIN XL) 150 MG 24 hr tablet Take 1 tablet (150 mg total) by mouth daily. 30 tablet 11     cetirizine (ZYRTEC) 10 MG tablet Take 10 mg by mouth daily as needed for allergies.        cholecalciferol, vitamin D3, 1,000 unit tablet Take 1,000 Units by mouth daily.       diclofenac (VOLTAREN) 75 MG EC tablet Take 75 mg by mouth daily.        donepezil (ARICEPT) 10 MG tablet Take 1 tablet (10 mg total) by mouth daily. 30 tablet 11     donepezil (ARICEPT) 5 MG tablet Take 1 tablet (5 mg  total) by mouth bedtime. 30 tablet 11     fenofibrate (TRIGLIDE) 160 MG tablet Take 1 tablet (160 mg total) by mouth daily. 90 tablet 0     fluticasone (FLONASE) 50 mcg/actuation nasal spray 1 spray into each nostril 2 times a day at 6:00 am and 4:00 pm. 16 g 6     FOLIC ACID/MULTIVIT-MIN/LUTEIN (CENTRUM SILVER ORAL) Take 1 tablet by mouth daily.       FOLIC ACID/MV,FE,OTHER MIN (CENTRUM ORAL) Take 1 tablet by mouth daily.       ginseng 100 mg cap Take 1 tablet by mouth daily.       glipiZIDE (GLUCOTROL) 2.5 MG 24 hr tablet TAKE ONE TABLET BY MOUTH ONE TIME DAILY 90 tablet 3     glipiZIDE (GLUCOTROL) 2.5 MG 24 hr tablet TAKE ONE TABLET BY MOUTH ONE TIME DAILY  30 tablet 5     glipiZIDE (GLUCOTROL) 2.5 MG 24 hr tablet TAKE ONE TABLET BY MOUTH ONE TIME DAILY 90 tablet 0     LANCETS MISC Use As Directed.       LYRICA 50 mg capsule TAKE 1 CAPSULE (50 MG TOTAL) BY MOUTH 3 (THREE) TIMES A DAY. 90 capsule 2     magnesium oxide 500 mg Tab Take 500 mg by mouth daily.       metFORMIN (GLUCOPHAGE) 1000 MG tablet TAKE ONE TABLET BY MOUTH TWICE DAILY WITH MEALS 60 tablet 1     metoprolol succinate (TOPROL-XL) 25 MG Take 1 tablet (25 mg total) by mouth daily. 90 tablet 2     mupirocin (BACTROBAN) 2 % ointment Apply topically 2 (two) times a day. 22 g 1     OMEGA-3/DHA/EPA/FISH OIL (FISH OIL-OMEGA-3 FATTY ACIDS) 300-1,000 mg capsule Take 2 g by mouth daily.       promethazine-dextromethorphan (PROMETHAZINE-DM) 6.25-15 mg/5 mL syrup Take 5 mL by mouth every 6 (six) hours as needed for cough. 118 mL 0     ranitidine (ZANTAC) 150 MG tablet TAKE ONE TABLET BY MOUTH TWICE DAILY 180 tablet 3     SAW PALMETTO ORAL Take 1 capsule by mouth daily.       tamsulosin (FLOMAX) 0.4 mg Cp24 TAKE 1 CAPSULE (0.4 MG TOTAL) BY MOUTH DAILY. 90 capsule 2     traZODone (DESYREL) 150 MG tablet Take 0.5 tablets (75 mg total) by mouth at bedtime. 90 tablet 1     white petrolatum-mineral oil (ARTIFICIAL TEARS, CARISSA/MIN,) 83-15 % Oint Use 1cm ribbon on  "lower lids nightly and in the am. 3.5 g 12     FLUoxetine (PROZAC) 10 MG capsule Take 1 capsule (10 mg total) by mouth daily. 30 capsule 6     HYDROcodone-acetaminophen 5-325 mg per tablet Take 1 tablet by mouth every 6 (six) hours as needed for pain. 60 tablet 0     No current facility-administered medications for this visit.      History   Smoking Status     Never Smoker   Smokeless Tobacco     Never Used       OBJECTICE: /64 (Patient Site: Left Arm, Patient Position: Sitting, Cuff Size: Adult Large)  Pulse 72  Temp 98.3  F (36.8  C) (Oral)   Resp 20  Ht 5' 11.5\" (1.816 m)  Wt 213 lb 4 oz (96.7 kg)  BMI 29.33 kg/m2     No results found for this or any previous visit (from the past 24 hour(s)).     GEN-alert, appropriate, in no apparent distress   HEENT-mucous membranes are moist, sclera are clear, neck is supple   CV-regular rate and rhythm   RESP-lungs clear to auscultation   ABDOMINAL-soft and nontender   EXTREM-warm with no edema   SKIN-no acute rash or vesicles, no foot ulcers   Psychiatric-appearance is well-groomed, speech of normal fluency and rate, mood is depressed, affect is normal, thought content negative for suicidal or homicidal ideation, thought processing negative for paranoid or delusional thinking but he does have significant memory loss.   Neurologic -strength and sensation are intact and symmetric.      Francisco Javier Rojas"

## 2021-06-12 NOTE — TELEPHONE ENCOUNTER
Refill Approved    Rx renewed per Medication Renewal Policy. Medication was last renewed on 10/29/19.    Judi Raymundo, [facility] Triage/Med Refill 10/23/2020     Requested Prescriptions   Pending Prescriptions Disp Refills     glipiZIDE (GLUCOTROL XL) 2.5 MG 24 hr tablet [Pharmacy Med Name: GLIPIZIDE ER 2.5 MG TABLET] 90 tablet 3     Sig: TAKE 1 TABLET (2.5 MG TOTAL) BY MOUTH DAILY.       Oral Hypoglycemics Refill Protocol Passed - 10/22/2020 12:28 AM        Passed - Visit with PCP or prescribing provider visit in last 6 months       Last office visit with prescriber/PCP: 10/13/2020 OR same dept: 10/13/2020 Francisco Javier Rojas MD OR same specialty: 10/13/2020 Francisco Javier Rojas MD Last physical: Visit date not found Last MTM visit: Visit date not found         Next appt within 3 mo: Visit date not found  Next physical within 3 mo: Visit date not found  Prescriber OR PCP: Francisco Javier Rojas MD  Last diagnosis associated with med order: 1. Type 2 diabetes mellitus without complication, without long-term current use of insulin (H)  - glipiZIDE (GLUCOTROL XL) 2.5 MG 24 hr tablet [Pharmacy Med Name: GLIPIZIDE ER 2.5 MG TABLET]; TAKE 1 TABLET (2.5 MG TOTAL) BY MOUTH DAILY.  Dispense: 90 tablet; Refill: 3     If protocol passes may refill for 12 months if within 3 months of last provider visit (or a total of 15 months).           Passed - A1C in last 6 months     Hemoglobin A1c   Date Value Ref Range Status   10/13/2020 6.7 (H) <=5.6 % Final     Comment:     Normal <5.7% Prediabete 5.7-6.4% Diabletes 6.5% or higher - adopted from ADA consensus guidelines               Passed - Microalbumin in last year      Microalbumin, Random Urine   Date Value Ref Range Status   11/04/2019 5.13 (H) 0.00 - 1.99 mg/dL Final                  Passed - Blood pressure in last year     BP Readings from Last 1 Encounters:   10/13/20 120/70             Passed - Serum creatinine in last year     Creatinine   Date Value Ref Range Status   01/14/2020  1.19 0.70 - 1.30 mg/dL Final

## 2021-06-12 NOTE — PROGRESS NOTES
ASSESMENT AND PLAN:  Diagnoses and all orders for this visit:    Neuropathic pain  Patient has tolerated this medication well in the past, will restart as prescribed below.  -     pregabalin (LYRICA) 50 MG capsule; Take 1 capsule (50 mg total) by mouth at bedtime.  Dispense: 90 capsule; Refill: 3    Lewy body dementia (H)  Stable, refill-     donepeziL (ARICEPT) 5 MG tablet; TAKE 1 TABLET BY MOUTH EVERYDAY AT BEDTIME.  Dispense: 90 tablet; Refill: 3    Bilateral hearing loss, unspecified hearing loss type  Counseling done with the patient and his wife on options including an over-the-counter hearing aid device versus formal audiology evaluation and treatment.  They are going to consider their options and let me know if they want referral.    Type 2 diabetes mellitus with stage 3 chronic kidney disease, with long-term current use of insulin, unspecified whether stage 3a or 3b CKD (H)  -     Glycosylated Hemoglobin A1c done today shows good control.  Recheck again in 6 months, continue current treatment plan.  -     LDL Cholesterol, Direct    Screening for prostate cancer  Extensive counseling with the patient and his wife and I recommended that he not do prostate cancer screening because of his advanced age and medical comorbidities.  However, the patient and his wife are very anxious because of the recent family history detailed below and wanted to proceed with PSA testing despite my recommendation.  -     PSA, Annual Screen (Prostatic-Specific Antigen)    Mild episode of recurrent major depressive disorder (H)  Stable.  Continue fluoxetine, recheck in 6 months.    Peripheral vascular disease, unspecified (H)  Stable.  Continue current medical management.  Checking LDL as detailed above.      Reviewed the risks and benefits of the treatment plan with the patient and/or caregiver and we discussed indications for routine and emergent follow-up.        SUBJECTIVE: 80-year-old male whose brother was recently diagnosed  "with prostate cancer.  He also has family history in his father.  This has made the patient anxious and worried about prostate cancer, his wife has the same concerns, and they would like him to be tested for prostate cancer.  Patient's memory loss has been stable.  His hearing loss seems to be getting worse, he is having severe difficulty hearing out of both ears, everything \"sounds muffled\" in day-to-day conversation.  He is past due for his diabetes follow-up.  Blood sugars have been well controlled.  Patient has been having some burning pain on both feet, mild, bothers him some at night and makes it hard for him sometimes to fall asleep.  Patient experiences this on the plantar aspect of both feet.  Mild to moderate in severity.  Patient has a history of lots of chronic back problems and in the past had been on Lyrica for his back pain and reports that it seemed to work pretty well for him.  Patient and his wife do not recall any side effects or problems from the medication.  Mood has been stable.    Past Medical History:   Diagnosis Date     Aortic stenosis      BPH (benign prostatic hyperplasia)      Chronic back pain      Chronic kidney disease      Degenerative joint disease      Dementia (H)      Depression      Diabetes mellitus (H)     type 2     Esophageal reflux      Fatigue      Hyperlipidemia      Hypertension      Left ventricular hypertrophy      Lumbar radiculopathy      Male erectile disorder      Obesity      ZAKIA on CPAP     machine broke at present     Short-term memory loss      Patient Active Problem List   Diagnosis     Male Erectile Disorder     Cerumen Impaction In The Right Ear     Tinea Cruris     Tinea Pedis     Obesity     Left Ventricular Hypertrophy     Constipation     Benign prostatic hyperplasia with post-void dribbling     Seasonal allergic rhinitis due to pollen     Abnormal Electrocardiogram     Memory Lapses Or Loss     Aortic Stenosis     Type II diabetes mellitus (H)     " Chronic Major Depression     Chronic Kidney Disease, Stage 3     Acute Sinusitis     Depression     Intermittent Claudication     Benign Adenomatous Polyp Of The Large Intestine     Lumbar Radiculopathy     Esophageal Reflux     Benign Essential Hypertension     Insomnia     Other hyperlipidemia     Localized Osteoarthritis Of The Knee     Lumbar Disc Degeneration     Vitamin D Deficiency     Actinic Keratosis     S/P AVR     Neurocognitive disorder     Coronary artery disease     Lewy body disease (H)     Dementia (H)     Primary osteoarthritis involving multiple joints     Current Outpatient Medications   Medication Sig Dispense Refill     ACCU-CHEK DAISHA PLUS TEST STRP strips USE TO TEST BLOOD SUGAR ONCE A DAY AS DIRECTED 100 strip 3     amoxicillin (AMOXIL) 500 MG capsule TAKE 4 CAPSULES BY MOUTH 1 HOUR BEFORE DENTAL APPOINTMENT 4 capsule 1     aspirin 81 MG tablet Take 81 mg by mouth daily.       cetirizine (ZYRTEC) 10 MG tablet Take 10 mg by mouth daily as needed for allergies.        cholecalciferol, vitamin D3, 1,000 unit tablet Take 1,000 Units by mouth daily.       donepezil (ARICEPT) 10 MG tablet TAKE 1 TABLET BY MOUTH EVERY DAY 90 tablet 3     donepeziL (ARICEPT) 5 MG tablet TAKE 1 TABLET BY MOUTH EVERYDAY AT BEDTIME. 90 tablet 3     ezetimibe (ZETIA) 10 mg tablet Take 1 tablet (10 mg total) by mouth daily. 30 tablet 11     fenofibrate (TRIGLIDE) 160 MG tablet Take 1 tablet (160 mg total) by mouth daily. 90 tablet 2     finasteride (PROSCAR) 5 mg tablet TAKE 1 TABLET BY MOUTH EVERY DAY 90 tablet 3     FLUoxetine (PROZAC) 10 MG capsule TAKE 1 CAPSULE BY MOUTH EVERY DAY 90 capsule 2     fluticasone (FLONASE) 50 mcg/actuation nasal spray 1 spray into each nostril 2 times a day at 6:00 am and 4:00 pm. 16 g 6     FOLIC ACID/MULTIVIT-MIN/LUTEIN (CENTRUM SILVER ORAL) Take 1 tablet by mouth daily.       glipiZIDE (GLUCOTROL XL) 2.5 MG 24 hr tablet TAKE 1 TABLET (2.5 MG TOTAL) BY MOUTH DAILY. 90 tablet 3      "HYDROcodone-acetaminophen 5-325 mg per tablet Take 1 tablet by mouth every 6 (six) hours as needed for pain. 60 tablet 0     LANCETS MISC Use As Directed.       metFORMIN (GLUCOPHAGE) 1000 MG tablet TAKE 1 TABLET BY MOUTH TWICE A DAY WITH MEALS 180 tablet 3     metoprolol succinate (TOPROL-XL) 25 MG TAKE 1 TABLET BY MOUTH EVERY DAY 90 tablet 1     pregabalin (LYRICA) 50 MG capsule Take 1 capsule (50 mg total) by mouth at bedtime. 90 capsule 3     ranitidine (ZANTAC) 150 MG tablet TAKE ONE TABLET BY MOUTH TWICE DAILY 180 tablet 2     tamsulosin (FLOMAX) 0.4 mg cap TAKE 1 CAPSULE BY MOUTH EVERY DAY 90 capsule 2     traZODone (DESYREL) 100 MG tablet TAKE 1 TABLET BY MOUTH EVERYDAY AT BEDTIME 90 tablet 1     white petrolatum-mineral oil (ARTIFICIAL TEARS, CARISSA/MIN,) 83-15 % Oint Use 1cm ribbon on lower lids nightly and in the am. 3.5 g 12     No current facility-administered medications for this visit.      Social History     Tobacco Use   Smoking Status Never Smoker   Smokeless Tobacco Never Used       OBJECTICE: /70   Pulse 69   Temp 98.3  F (36.8  C) (Oral)   Resp 18   Ht 5' 10.98\" (1.803 m)   Wt 196 lb (88.9 kg)   SpO2 94%   BMI 27.35 kg/m       Recent Results (from the past 24 hour(s))   Glycosylated Hemoglobin A1c    Collection Time: 10/13/20  2:36 PM   Result Value Ref Range    Hemoglobin A1c 6.7 (H) <=5.6 %        GEN-alert, appropriate, in no apparent distress   HEENT-some cerumen in both ear canals but no complete occlusion of the ear canals.   CV-regular rate and rhythm   RESP-lungs clear to auscultation   Neurologic-strength is symmetric, sensation to light touch diminished on the plantar aspect of both feet.   Foot exam-palpable pedal pulses bilaterally, no ulcers, decreased sensation as detailed above.   Psychiatric-appearance is well-groomed, speech of normal fluency and rate, mood mildly depressed, affect normal, thought content negative for suicidal or homicidal ideation, thought " processing negative for paranoid or delusional thinking.    Francisco Javeir Rojas

## 2021-06-12 NOTE — TELEPHONE ENCOUNTER
Refill Approved    Rx renewed per Medication Renewal Policy. Medication was last renewed on 5/13/20.    Judi Raymundo, [facility] Triage/Med Refill 11/2/2020     Requested Prescriptions   Pending Prescriptions Disp Refills     traZODone (DESYREL) 100 MG tablet [Pharmacy Med Name: TRAZODONE 100 MG TABLET] 90 tablet 1     Sig: TAKE 1 TABLET BY MOUTH EVERYDAY AT BEDTIME       Tricyclics/Misc Antidepressant/Antianxiety Meds Refill Protocol Passed - 10/29/2020  2:12 PM        Passed - PCP or prescribing provider visit in last year     Last office visit with prescriber/PCP: 10/13/2020 Francisco Javier Rojas MD OR same dept: 10/13/2020 Francisco Javier Rojas MD OR same specialty: 10/13/2020 Francisco Javier Rojas MD  Last physical: 11/4/2019 Last MTM visit: Visit date not found   Next visit within 3 mo: Visit date not found  Next physical within 3 mo: Visit date not found  Prescriber OR PCP: Francisco Javier Rojas MD  Last diagnosis associated with med order: 1. Insomnia  - traZODone (DESYREL) 100 MG tablet [Pharmacy Med Name: TRAZODONE 100 MG TABLET]; TAKE 1 TABLET BY MOUTH EVERYDAY AT BEDTIME  Dispense: 90 tablet; Refill: 1    If protocol passes may refill for 12 months if within 3 months of last provider visit (or a total of 15 months).

## 2021-06-13 NOTE — PROGRESS NOTES
Assessment:      Annual Wellness Visit    Type 2 diabetes, well controlled  Symptomatic skin tag of the right posterior trunk  Dementia, stable.     Plan:      A1c done today.  Skin tag removal done today after discussion of risks and benefits and aftercare instructions with the patient and his wife.  No complications at the time of procedure.  Benign skin tags so no pathology specimen.  No blood loss, patient tolerated the procedure well.  Continue current treatment plan and recheck in 6 months.    Subjective:      Nestor Peterson is a 77 y.o. male who presents for a Initial Annual Wellness Visit.  He has been sleeping better since the medication changes were made at his last visit.  He continues to struggle with memory loss but things have not had any significant worsening.  He also has type 2 diabetes.  Most of his blood sugars have been between 89 and 145.  Patient reports a skin tag on his right posterior trunk that gets itchy and irritated and he would like it removed.  He has had skin tags removed in the past without difficulty.    Healthy Habits:   Regular Exercise: Yes  Sunscreen Use: No  Healthy Diet: Yes  Dental Visits Regularly: Yes  Seat Belt: Yes  Sexually active: No  Monthly Self Testicular Exams:  No  Hemoccults: N/A  Flex Sig: N/A  Colonoscopy: Yes  Lipid Profile: No  Glucose Screen: No  Prevention of Osteoporosis: No  Last Dexa: No  Guns at Home:  No      Immunization History   Administered Date(s) Administered     DT (pediatric) 11/04/1998, 11/14/2007     Hep A, historic 01/04/2008, 07/10/2008     Influenza L2c6-55, 12/17/2009     Influenza high dose, seasonal 10/13/2015, 10/18/2016     Influenza, Seasonal, Inj PF 01/01/1900     Influenza, inj, historic 11/01/2007, 11/03/2008, 10/01/2009, 10/01/2010, 10/01/2011     Influenza,seasonal quad, PF 09/23/2014     Pneumo Conj 13-V (2010&after) 10/13/2015     Pneumo Polysac 23-V 11/12/2002, 09/21/2006     Td, historic 11/14/2007     Tdap 09/06/2013      ZOSTER 01/04/2008     Immunization status: up to date and documented.    Current Outpatient Prescriptions   Medication Sig Dispense Refill     acetaminophen 500 mg TbEF Take 1-2 tablets by mouth every 6 (six) hours as needed.       amoxicillin (AMOXIL) 500 MG capsule TAKE 4 CAPSULES BY MOUTH 1 HOUR BEFORE DENTAL APPOINTMENT 4 capsule 1     ascorbic acid (ASCORBIC ACID WITH LISBET HIPS) 500 MG tablet Take 500 mg by mouth daily.       aspirin 81 MG tablet Take 81 mg by mouth daily.       blood glucose test (ACCU-CHEK DAISHA PLUS TEST STRP) strips Test once daily. 100 strip 11     cetirizine (ZYRTEC) 10 MG tablet Take 10 mg by mouth daily as needed for allergies.        cholecalciferol, vitamin D3, 1,000 unit tablet Take 1,000 Units by mouth daily.       diclofenac (VOLTAREN) 75 MG EC tablet Take 75 mg by mouth daily.        donepezil (ARICEPT) 10 MG tablet Take 1 tablet (10 mg total) by mouth daily. 30 tablet 11     donepezil (ARICEPT) 5 MG tablet Take 1 tablet (5 mg total) by mouth bedtime. 30 tablet 11     fenofibrate (TRIGLIDE) 160 MG tablet Take 1 tablet (160 mg total) by mouth daily. 90 tablet 3     FLUoxetine (PROZAC) 10 MG capsule Take 1 capsule (10 mg total) by mouth daily. 30 capsule 6     fluticasone (FLONASE) 50 mcg/actuation nasal spray 1 spray into each nostril 2 times a day at 6:00 am and 4:00 pm. 16 g 6     FOLIC ACID/MULTIVIT-MIN/LUTEIN (CENTRUM SILVER ORAL) Take 1 tablet by mouth daily.       FOLIC ACID/MV,FE,OTHER MIN (CENTRUM ORAL) Take 1 tablet by mouth daily.       ginseng 100 mg cap Take 1 tablet by mouth daily.       glipiZIDE (GLUCOTROL XL) 2.5 MG 24 hr tablet TAKE ONE TABLET BY MOUTH ONE TIME DAILY 90 tablet 0     glipiZIDE (GLUCOTROL) 2.5 MG 24 hr tablet TAKE ONE TABLET BY MOUTH ONE TIME DAILY 90 tablet 3     glipiZIDE (GLUCOTROL) 2.5 MG 24 hr tablet TAKE ONE TABLET BY MOUTH ONE TIME DAILY  30 tablet 5     HYDROcodone-acetaminophen 5-325 mg per tablet Take 1 tablet by mouth every 6 (six) hours  as needed for pain. 60 tablet 0     LANCETS MISC Use As Directed.       LYRICA 50 mg capsule TAKE 1 CAPSULE (50 MG TOTAL) BY MOUTH 3 (THREE) TIMES A DAY. 90 capsule 2     magnesium oxide 500 mg Tab Take 500 mg by mouth daily.       metFORMIN (GLUCOPHAGE) 1000 MG tablet TAKE ONE TABLET BY MOUTH TWICE DAILY WITH MEALS 60 tablet 4     metoprolol succinate (TOPROL-XL) 25 MG Take 1 tablet (25 mg total) by mouth daily. 90 tablet 3     mupirocin (BACTROBAN) 2 % ointment Apply topically 2 (two) times a day. 22 g 1     OMEGA-3/DHA/EPA/FISH OIL (FISH OIL-OMEGA-3 FATTY ACIDS) 300-1,000 mg capsule Take 2 g by mouth daily.       promethazine-dextromethorphan (PROMETHAZINE-DM) 6.25-15 mg/5 mL syrup Take 5 mL by mouth every 6 (six) hours as needed for cough. 118 mL 0     ranitidine (ZANTAC) 150 MG tablet TAKE ONE TABLET BY MOUTH TWICE DAILY 180 tablet 3     SAW PALMETTO ORAL Take 1 capsule by mouth daily.       tamsulosin (FLOMAX) 0.4 mg Cp24 TAKE 1 CAPSULE (0.4 MG TOTAL) BY MOUTH DAILY. 90 capsule 2     traZODone (DESYREL) 150 MG tablet Take 0.5 tablets (75 mg total) by mouth at bedtime. 90 tablet 1     white petrolatum-mineral oil (ARTIFICIAL TEARS, CARISSA/MIN,) 83-15 % Oint Use 1cm ribbon on lower lids nightly and in the am. 3.5 g 12     buPROPion (WELLBUTRIN XL) 150 MG 24 hr tablet Take 1 tablet (150 mg total) by mouth daily. 30 tablet 11     No current facility-administered medications for this visit.      Past Medical History:   Diagnosis Date     Aortic stenosis      BPH (benign prostatic hyperplasia)      Chronic back pain      Chronic kidney disease      Degenerative joint disease      Dementia      Depression      Diabetes mellitus     type 2     Esophageal reflux      Fatigue      Hyperlipidemia      Hypertension      Left ventricular hypertrophy      Lumbar radiculopathy      Male erectile disorder      Obesity      ZAKIA on CPAP     machine broke at present     Short-term memory loss      Past Surgical History:    Procedure Laterality Date     APPENDECTOMY       CARDIAC CATHETERIZATION  6/26/14     CATARACT EXTRACTION Bilateral      CHOLECYSTECTOMY       UT APPENDECTOMY      Description: Appendectomy;  Recorded: 12/17/2009;     UT KNEE SCOPE,DIAGNOSTIC      Description: Arthroscopy Knee Left;  Proc Date: 08/12/2010;  Comments: Dr Daily     UT REMOVAL GALLBLADDER      Description: Cholecystectomy;  Recorded: 12/17/2009;     Codeine  Family History   Problem Relation Age of Onset     Diabetes Brother      Social History     Social History     Marital status:      Spouse name: N/A     Number of children: N/A     Years of education: N/A     Occupational History     Not on file.     Social History Main Topics     Smoking status: Never Smoker     Smokeless tobacco: Never Used     Alcohol use No     Drug use: No     Sexual activity: No     Other Topics Concern     Not on file     Social History Narrative       Current Diet:  well balanced diet  Amount Consumed Per Day  2 meals per day will be continued    Exercise Type: walking  Exercise Frequency: Infrequent exercise    Mood Disorder and Cognitive Impairment Screenings  Anxiety Screening negative   depression Screening Tool: PHQ 9 depression Screening Tool Score: 1   cognitive Impairment and Additional Screening:  Difficulty with learning/retaining new information.    Functional Ability/Level of Safety  Fall Risk Factors:  cognitive impairment and urinary incontinence  Home Safety Risk Factors: none    Advanced Directive:  I have had an Advanced Directive discussion with the patient.    Co-Managers and Medical Equipment/Suppliers: Pharmacy    Review of Systems  Review of Systems    No chest pain, no shortness of breath, no blood in the urine or blood in the stool, no skin lesions that have been changing, remainder review of systems is as above are negative.      Objective:     Vitals:    11/01/17 1227   BP: 120/60   Pulse: (!) 59   Resp: 14   Temp: 97.5  F (36.4  C)  "  TempSrc: Oral   SpO2: 92%   Weight: 205 lb (93 kg)   Height: 5' 11\" (1.803 m)       Gen - alert, orientated, NAD  Eyes - fundascopic exam limited by the undialated pupil but looks symmetric  ENT - oropharynx clear, TMs clear  Neck - supple, no palpable mass or lymphadenopathy  CV - RRR, no murmur  Resp - lungs CTA  Ab - soft, nontender, no palpable mass or organomegaly   - normal appearance to the external genetalia, normal testicular exam bilaterally, no hernia  Extrem - warm, no edema  Neuro - CN II-XII intact, strength, sensation, reflexes intact and symmetric  Skin - no rash, no atypical appearing lesions seen, 1 cm skin tag on the posterior right back is benign appearing.  Removed as above.            Assessment and Plan:       1. Type II diabetes mellitus    - Glycosylated Hemoglobin A1c    2. Skin tag        The patient's current medical problems were reviewed.    I have had an Advance Directives discussion with the patient.  The following health maintenance schedule was reviewed with the patient and provided in printed form in the after visit summary:   Health Maintenance   Topic Date Due     DIABETES URINE MICROALBUMIN  09/06/2014     DIABETES HEMOGLOBIN A1C  12/19/2017     DIABETES OPHTHALMOLOGY EXAM  03/08/2018     DIABETES FOLLOW-UP  05/01/2018     DIABETES FOOT EXAM  11/01/2018     FALL RISK ASSESSMENT  11/01/2018     ADVANCE DIRECTIVES DISCUSSED WITH PATIENT  07/18/2019     TD 18+ HE  09/06/2023     PNEUMOCOCCAL POLYSACCHARIDE VACCINE AGE 65 AND OVER  Completed     INFLUENZA VACCINE RULE BASED  Completed     PNEUMOCOCCAL CONJUGATE VACCINE FOR ADULTS (PCV13 OR PREVNAR)  Completed     ZOSTER VACCINE  Completed     DEPRESSION FOLLOW UP  Excluded        Subjective:   Chief Complaint: Nestor Peterson is an 77 y.o. male here for an Annual Wellness visit.   HPI:      Review of Systems:  Please see above.  The rest of the review of systems are negative for all systems.    Patient Care Team:  Francisco Javier Rojas, " MD as PCP - General (Family Medicine)     Patient Active Problem List   Diagnosis     Male Erectile Disorder     Cerumen Impaction In The Right Ear     Tinea Cruris     Tinea Pedis     Obesity     Left Ventricular Hypertrophy     Constipation     Benign Prostatic Hypertrophy     Allergies     Abnormal Electrocardiogram     Memory Lapses Or Loss     Aortic Stenosis     Type II diabetes mellitus     Chronic Major Depression     Chronic Kidney Disease, Stage 3     Acute Sinusitis     Depression     Intermittent Claudication     Benign Adenomatous Polyp Of The Large Intestine     Lumbar Radiculopathy     Esophageal Reflux     Benign Essential Hypertension     Insomnia     Hyperlipidemia     Localized Osteoarthritis Of The Knee     Lumbar Disc Degeneration     Vitamin D Deficiency     Actinic Keratosis     S/P AVR     Neurocognitive disorder     Coronary artery disease     Lewy body disease     Dementia     Past Medical History:   Diagnosis Date     Aortic stenosis      BPH (benign prostatic hyperplasia)      Chronic back pain      Chronic kidney disease      Degenerative joint disease      Dementia      Depression      Diabetes mellitus     type 2     Esophageal reflux      Fatigue      Hyperlipidemia      Hypertension      Left ventricular hypertrophy      Lumbar radiculopathy      Male erectile disorder      Obesity      ZAKIA on CPAP     machine broke at present     Short-term memory loss       Past Surgical History:   Procedure Laterality Date     APPENDECTOMY       CARDIAC CATHETERIZATION  6/26/14     CATARACT EXTRACTION Bilateral      CHOLECYSTECTOMY       HI APPENDECTOMY      Description: Appendectomy;  Recorded: 12/17/2009;     HI KNEE SCOPE,DIAGNOSTIC      Description: Arthroscopy Knee Left;  Proc Date: 08/12/2010;  Comments: Dr Daily     HI REMOVAL GALLBLADDER      Description: Cholecystectomy;  Recorded: 12/17/2009;      Family History   Problem Relation Age of Onset     Diabetes Brother       Social History      Social History     Marital status:      Spouse name: N/A     Number of children: N/A     Years of education: N/A     Occupational History     Not on file.     Social History Main Topics     Smoking status: Never Smoker     Smokeless tobacco: Never Used     Alcohol use No     Drug use: No     Sexual activity: No     Other Topics Concern     Not on file     Social History Narrative      Current Outpatient Prescriptions   Medication Sig Dispense Refill     acetaminophen 500 mg TbEF Take 1-2 tablets by mouth every 6 (six) hours as needed.       amoxicillin (AMOXIL) 500 MG capsule TAKE 4 CAPSULES BY MOUTH 1 HOUR BEFORE DENTAL APPOINTMENT 4 capsule 1     ascorbic acid (ASCORBIC ACID WITH LISBET HIPS) 500 MG tablet Take 500 mg by mouth daily.       aspirin 81 MG tablet Take 81 mg by mouth daily.       blood glucose test (ACCU-CHEK DAISHA PLUS TEST STRP) strips Test once daily. 100 strip 11     cetirizine (ZYRTEC) 10 MG tablet Take 10 mg by mouth daily as needed for allergies.        cholecalciferol, vitamin D3, 1,000 unit tablet Take 1,000 Units by mouth daily.       diclofenac (VOLTAREN) 75 MG EC tablet Take 75 mg by mouth daily.        donepezil (ARICEPT) 10 MG tablet Take 1 tablet (10 mg total) by mouth daily. 30 tablet 11     donepezil (ARICEPT) 5 MG tablet Take 1 tablet (5 mg total) by mouth bedtime. 30 tablet 11     fenofibrate (TRIGLIDE) 160 MG tablet Take 1 tablet (160 mg total) by mouth daily. 90 tablet 3     FLUoxetine (PROZAC) 10 MG capsule Take 1 capsule (10 mg total) by mouth daily. 30 capsule 6     fluticasone (FLONASE) 50 mcg/actuation nasal spray 1 spray into each nostril 2 times a day at 6:00 am and 4:00 pm. 16 g 6     FOLIC ACID/MULTIVIT-MIN/LUTEIN (CENTRUM SILVER ORAL) Take 1 tablet by mouth daily.       FOLIC ACID/MV,FE,OTHER MIN (CENTRUM ORAL) Take 1 tablet by mouth daily.       ginseng 100 mg cap Take 1 tablet by mouth daily.       glipiZIDE (GLUCOTROL XL) 2.5 MG 24 hr tablet Take 1 tablet  "(2.5 mg total) by mouth daily. 90 tablet 3     HYDROcodone-acetaminophen 5-325 mg per tablet Take 1 tablet by mouth every 6 (six) hours as needed for pain. 60 tablet 0     LANCETS MISC Use As Directed.       LYRICA 50 mg capsule TAKE 1 CAPSULE (50 MG TOTAL) BY MOUTH 3 (THREE) TIMES A DAY. 90 capsule 2     magnesium oxide 500 mg Tab Take 500 mg by mouth daily.       metFORMIN (GLUCOPHAGE) 1000 MG tablet TAKE ONE TABLET BY MOUTH TWICE DAILY WITH MEALS 60 tablet 4     metoprolol succinate (TOPROL-XL) 25 MG Take 1 tablet (25 mg total) by mouth daily. 90 tablet 3     mupirocin (BACTROBAN) 2 % ointment Apply topically 2 (two) times a day. 22 g 1     OMEGA-3/DHA/EPA/FISH OIL (FISH OIL-OMEGA-3 FATTY ACIDS) 300-1,000 mg capsule Take 2 g by mouth daily.       promethazine-dextromethorphan (PROMETHAZINE-DM) 6.25-15 mg/5 mL syrup Take 5 mL by mouth every 6 (six) hours as needed for cough. 118 mL 0     ranitidine (ZANTAC) 150 MG tablet TAKE ONE TABLET BY MOUTH TWICE DAILY 180 tablet 3     SAW PALMETTO ORAL Take 1 capsule by mouth daily.       tamsulosin (FLOMAX) 0.4 mg Cp24 TAKE 1 CAPSULE (0.4 MG TOTAL) BY MOUTH DAILY. 90 capsule 2     traZODone (DESYREL) 150 MG tablet Take 0.5 tablets (75 mg total) by mouth at bedtime. 90 tablet 1     white petrolatum-mineral oil (ARTIFICIAL TEARS, CARISSA/MIN,) 83-15 % Oint Use 1cm ribbon on lower lids nightly and in the am. 3.5 g 12     buPROPion (WELLBUTRIN XL) 150 MG 24 hr tablet Take 1 tablet (150 mg total) by mouth daily. 30 tablet 11     No current facility-administered medications for this visit.       Objective:   Vital Signs:   Visit Vitals     /60     Pulse (!) 59     Temp 97.5  F (36.4  C) (Oral)     Resp 14     Ht 5' 11\" (1.803 m)     Wt 205 lb (93 kg)     SpO2 92%     BMI 28.59 kg/m2        VisionScreening:  No exam data present     PHYSICAL EXAM    Assessment Results 11/1/2017   Activities of Daily Living No help needed   Instrumental Activities of Daily Living No help needed "   Mini Cog Total Score 3   Some recent data might be hidden     A Mini-Cog score of 0-2 suggests the possibility of dementia, score of 3-5 suggests no dementia    Identified Health Risks:     The patient was provided with suggestions to help him develop a healthy lifestyle.   He is at risk for lack of exercise and has been provided with information to increase physical activity for the benefit of his well-being.  The patient was provided with written information regarding signs of hearing loss.  Patient's advanced directive was discussed and I am comfortable with the patient's wishes.        Assessment and Plan:       1. Type II diabetes mellitus    - Glycosylated Hemoglobin A1c    2. Skin tag      3. Routine general medical examination at a health care facility        The patient's current medical problems were reviewed.    I have had an Advance Directives discussion with the patient.  The following health maintenance schedule was reviewed with the patient and provided in printed form in the after visit summary:   Health Maintenance   Topic Date Due     DIABETES URINE MICROALBUMIN  09/06/2014     DIABETES HEMOGLOBIN A1C  12/19/2017     DIABETES OPHTHALMOLOGY EXAM  03/08/2018     DIABETES FOLLOW-UP  05/01/2018     DIABETES FOOT EXAM  11/01/2018     FALL RISK ASSESSMENT  11/01/2018     ADVANCE DIRECTIVES DISCUSSED WITH PATIENT  07/18/2019     TD 18+ HE  09/06/2023     PNEUMOCOCCAL POLYSACCHARIDE VACCINE AGE 65 AND OVER  Completed     INFLUENZA VACCINE RULE BASED  Completed     PNEUMOCOCCAL CONJUGATE VACCINE FOR ADULTS (PCV13 OR PREVNAR)  Completed     ZOSTER VACCINE  Completed     DEPRESSION FOLLOW UP  Excluded        Subjective:   Chief Complaint: Nestor Peterson is an 77 y.o. male here for an Annual Wellness visit.   HPI:      Review of Systems:    Please see above.  The rest of the review of systems are negative for all systems.    Patient Care Team:  Francisco Javier Rojas MD as PCP - General (Family Medicine)     Patient  Active Problem List   Diagnosis     Male Erectile Disorder     Cerumen Impaction In The Right Ear     Tinea Cruris     Tinea Pedis     Obesity     Left Ventricular Hypertrophy     Constipation     Benign Prostatic Hypertrophy     Allergies     Abnormal Electrocardiogram     Memory Lapses Or Loss     Aortic Stenosis     Type II diabetes mellitus     Chronic Major Depression     Chronic Kidney Disease, Stage 3     Acute Sinusitis     Depression     Intermittent Claudication     Benign Adenomatous Polyp Of The Large Intestine     Lumbar Radiculopathy     Esophageal Reflux     Benign Essential Hypertension     Insomnia     Hyperlipidemia     Localized Osteoarthritis Of The Knee     Lumbar Disc Degeneration     Vitamin D Deficiency     Actinic Keratosis     S/P AVR     Neurocognitive disorder     Coronary artery disease     Lewy body disease     Dementia     Past Medical History:   Diagnosis Date     Aortic stenosis      BPH (benign prostatic hyperplasia)      Chronic back pain      Chronic kidney disease      Degenerative joint disease      Dementia      Depression      Diabetes mellitus     type 2     Esophageal reflux      Fatigue      Hyperlipidemia      Hypertension      Left ventricular hypertrophy      Lumbar radiculopathy      Male erectile disorder      Obesity      ZAKIA on CPAP     machine broke at present     Short-term memory loss       Past Surgical History:   Procedure Laterality Date     APPENDECTOMY       CARDIAC CATHETERIZATION  6/26/14     CATARACT EXTRACTION Bilateral      CHOLECYSTECTOMY       SD APPENDECTOMY      Description: Appendectomy;  Recorded: 12/17/2009;     SD KNEE SCOPE,DIAGNOSTIC      Description: Arthroscopy Knee Left;  Proc Date: 08/12/2010;  Comments: Dr Daily     SD REMOVAL GALLBLADDER      Description: Cholecystectomy;  Recorded: 12/17/2009;      Family History   Problem Relation Age of Onset     Diabetes Brother       Social History     Social History     Marital status:       Spouse name: N/A     Number of children: N/A     Years of education: N/A     Occupational History     Not on file.     Social History Main Topics     Smoking status: Never Smoker     Smokeless tobacco: Never Used     Alcohol use No     Drug use: No     Sexual activity: No     Other Topics Concern     Not on file     Social History Narrative      Current Outpatient Prescriptions   Medication Sig Dispense Refill     acetaminophen 500 mg TbEF Take 1-2 tablets by mouth every 6 (six) hours as needed.       amoxicillin (AMOXIL) 500 MG capsule TAKE 4 CAPSULES BY MOUTH 1 HOUR BEFORE DENTAL APPOINTMENT 4 capsule 1     ascorbic acid (ASCORBIC ACID WITH LISBET HIPS) 500 MG tablet Take 500 mg by mouth daily.       aspirin 81 MG tablet Take 81 mg by mouth daily.       blood glucose test (ACCU-CHEK DAISHA PLUS TEST STRP) strips Test once daily. 100 strip 11     cetirizine (ZYRTEC) 10 MG tablet Take 10 mg by mouth daily as needed for allergies.        cholecalciferol, vitamin D3, 1,000 unit tablet Take 1,000 Units by mouth daily.       diclofenac (VOLTAREN) 75 MG EC tablet Take 75 mg by mouth daily.        donepezil (ARICEPT) 10 MG tablet Take 1 tablet (10 mg total) by mouth daily. 30 tablet 11     donepezil (ARICEPT) 5 MG tablet Take 1 tablet (5 mg total) by mouth bedtime. 30 tablet 11     fenofibrate (TRIGLIDE) 160 MG tablet Take 1 tablet (160 mg total) by mouth daily. 90 tablet 3     FLUoxetine (PROZAC) 10 MG capsule Take 1 capsule (10 mg total) by mouth daily. 30 capsule 6     fluticasone (FLONASE) 50 mcg/actuation nasal spray 1 spray into each nostril 2 times a day at 6:00 am and 4:00 pm. 16 g 6     FOLIC ACID/MULTIVIT-MIN/LUTEIN (CENTRUM SILVER ORAL) Take 1 tablet by mouth daily.       FOLIC ACID/MV,FE,OTHER MIN (CENTRUM ORAL) Take 1 tablet by mouth daily.       ginseng 100 mg cap Take 1 tablet by mouth daily.       glipiZIDE (GLUCOTROL XL) 2.5 MG 24 hr tablet Take 1 tablet (2.5 mg total) by mouth daily. 90 tablet 3      "HYDROcodone-acetaminophen 5-325 mg per tablet Take 1 tablet by mouth every 6 (six) hours as needed for pain. 60 tablet 0     LANCETS MISC Use As Directed.       LYRICA 50 mg capsule TAKE 1 CAPSULE (50 MG TOTAL) BY MOUTH 3 (THREE) TIMES A DAY. 90 capsule 2     magnesium oxide 500 mg Tab Take 500 mg by mouth daily.       metFORMIN (GLUCOPHAGE) 1000 MG tablet TAKE ONE TABLET BY MOUTH TWICE DAILY WITH MEALS 60 tablet 4     metoprolol succinate (TOPROL-XL) 25 MG Take 1 tablet (25 mg total) by mouth daily. 90 tablet 3     mupirocin (BACTROBAN) 2 % ointment Apply topically 2 (two) times a day. 22 g 1     OMEGA-3/DHA/EPA/FISH OIL (FISH OIL-OMEGA-3 FATTY ACIDS) 300-1,000 mg capsule Take 2 g by mouth daily.       promethazine-dextromethorphan (PROMETHAZINE-DM) 6.25-15 mg/5 mL syrup Take 5 mL by mouth every 6 (six) hours as needed for cough. 118 mL 0     ranitidine (ZANTAC) 150 MG tablet TAKE ONE TABLET BY MOUTH TWICE DAILY 180 tablet 3     SAW PALMETTO ORAL Take 1 capsule by mouth daily.       tamsulosin (FLOMAX) 0.4 mg Cp24 TAKE 1 CAPSULE (0.4 MG TOTAL) BY MOUTH DAILY. 90 capsule 2     traZODone (DESYREL) 150 MG tablet Take 0.5 tablets (75 mg total) by mouth at bedtime. 90 tablet 1     white petrolatum-mineral oil (ARTIFICIAL TEARS, CARISSA/MIN,) 83-15 % Oint Use 1cm ribbon on lower lids nightly and in the am. 3.5 g 12     buPROPion (WELLBUTRIN XL) 150 MG 24 hr tablet Take 1 tablet (150 mg total) by mouth daily. 30 tablet 11     No current facility-administered medications for this visit.       Objective:   Vital Signs:   Visit Vitals     /60     Pulse (!) 59     Temp 97.5  F (36.4  C) (Oral)     Resp 14     Ht 5' 11\" (1.803 m)     Wt 205 lb (93 kg)     SpO2 92%     BMI 28.59 kg/m2        VisionScreening:  No exam data present     PHYSICAL EXAM      Assessment Results 11/1/2017   Activities of Daily Living No help needed   Instrumental Activities of Daily Living No help needed   Mini Cog Total Score 3   Some recent data " might be hidden     A Mini-Cog score of 0-2 suggests the possibility of dementia, score of 3-5 suggests no dementia    Identified Health Risks:     The patient was provided with suggestions to help him develop a healthy lifestyle.   He is at risk for lack of exercise and has been provided with information to increase physical activity for the benefit of his well-being.  The patient was provided with written information regarding signs of hearing loss.  Patient's advanced directive was discussed and I am comfortable with the patient's wishes.

## 2021-06-13 NOTE — TELEPHONE ENCOUNTER
Refill Approved    Rx renewed per Medication Renewal Policy. Medication was last renewed on 4/2/20.    Judi Raymundo, [facility] Triage/Med Refill 12/18/2020     Requested Prescriptions   Pending Prescriptions Disp Refills     FLUoxetine (PROZAC) 10 MG capsule [Pharmacy Med Name: FLUOXETINE HCL 10 MG CAPSULE] 90 capsule 2     Sig: TAKE 1 CAPSULE BY MOUTH EVERY DAY       [facility] Refill Protocol  Passed - 12/17/2020  3:40 AM        Passed - PCP or prescribing provider visit in last year     Last office visit with prescriber/PCP: 11/10/2020 Francisco Javier Rojas MD OR same dept: 11/10/2020 Francisco Javier Rojas MD OR same specialty: 11/10/2020 Francisco Javier Rojas MD  Last physical: 11/4/2019 Last MTM visit: Visit date not found   Next visit within 3 mo: Visit date not found  Next physical within 3 mo: Visit date not found  Prescriber OR PCP: Francisco Javier Rojas MD  Last diagnosis associated with med order: 1. Moderate episode of recurrent major depressive disorder (H)  - FLUoxetine (PROZAC) 10 MG capsule [Pharmacy Med Name: FLUOXETINE HCL 10 MG CAPSULE]; TAKE 1 CAPSULE BY MOUTH EVERY DAY  Dispense: 90 capsule; Refill: 2    If protocol passes may refill for 12 months if within 3 months of last provider visit (or a total of 15 months).

## 2021-06-13 NOTE — PROGRESS NOTES
ASSESMENT AND PLAN:  Diagnoses and all orders for this visit:    Primary osteoarthritis involving multiple joints  I think he had a bone bruise on the right tibia and I do not think an x-ray is necessary given that he is having no pain with weightbearing and his symptoms have improved a lot over the last few days.  Counseled the patient and his wife on observation.  I think his right foot pain is likely related to his arthritis and his leg pain overall is likely related to his multijoint arthritis, with a component of neuropathic pain, continue with his current treatment plan.  Add arch supports.    Type 2 diabetes mellitus with stage 3 chronic kidney disease, with long-term current use of insulin, unspecified whether stage 3a or 3b CKD (H)  Well-controlled.  Reviewed his recent lab results with him from last time.  Continue current treatment plan and recheck again in 6 months.      Reviewed the risks and benefits of the treatment plan with the patient and/or caregiver and we discussed indications for routine and emergent follow-up.        SUBJECTIVE: 80-year-old male comes in with concern about right leg pain.  He had been out doing some chopping of wood and the blunt end of the ax came back and impacted his right shin area.  This occurred a couple of weeks ago.  He has noticed a firm bump in that area that has been moderately painful improving to mildly painful over the last few days.  No pain with weightbearing.  He is also been getting some aching pain in his feet, right side worse than left.  The pain is in the area of the arch.  Patient reports that he did not yet receive my letter about his recent blood test results.  His blood sugars continue to be well controlled, and his home blood sugar monitoring his range has been between 96 and 135.    Past Medical History:   Diagnosis Date     Aortic stenosis      BPH (benign prostatic hyperplasia)      Chronic back pain      Chronic kidney disease      Degenerative  joint disease      Dementia (H)      Depression      Diabetes mellitus (H)     type 2     Esophageal reflux      Fatigue      Hyperlipidemia      Hypertension      Left ventricular hypertrophy      Lumbar radiculopathy      Male erectile disorder      Obesity      [employer] on CPAP     machine broke at present     Short-term memory loss      Patient Active Problem List   Diagnosis     Male Erectile Disorder     Cerumen Impaction In The Right Ear     Tinea Cruris     Tinea Pedis     Obesity     Left Ventricular Hypertrophy     Constipation     Benign prostatic hyperplasia with post-void dribbling     Seasonal allergic rhinitis due to pollen     Abnormal Electrocardiogram     Memory Lapses Or Loss     Aortic Stenosis     Type II diabetes mellitus (H)     Chronic Major Depression     Chronic Kidney Disease, Stage 3     Acute Sinusitis     Depression     Intermittent Claudication     Benign Adenomatous Polyp Of The Large Intestine     Lumbar Radiculopathy     Esophageal Reflux     Benign Essential Hypertension     Insomnia     Other hyperlipidemia     Localized Osteoarthritis Of The Knee     Lumbar Disc Degeneration     Vitamin D Deficiency     Actinic Keratosis     S/P AVR     Neurocognitive disorder     Coronary artery disease     Lewy body disease (H)     Dementia (H)     Primary osteoarthritis involving multiple joints     Current Outpatient Medications   Medication Sig Dispense Refill     ACCU-CHEK DAISAH PLUS TEST STRP strips USE TO TEST BLOOD SUGAR ONCE A DAY AS DIRECTED 100 strip 3     aspirin 81 MG tablet Take 81 mg by mouth daily.       cetirizine (ZYRTEC) 10 MG tablet Take 10 mg by mouth daily as needed for allergies.        cholecalciferol, vitamin D3, 1,000 unit tablet Take 1,000 Units by mouth daily.       donepezil (ARICEPT) 10 MG tablet TAKE 1 TABLET BY MOUTH EVERY DAY 90 tablet 3     donepeziL (ARICEPT) 5 MG tablet TAKE 1 TABLET BY MOUTH EVERYDAY AT BEDTIME. 90 tablet 3     ezetimibe (ZETIA) 10 mg tablet Take  "1 tablet (10 mg total) by mouth daily. 90 tablet 1     fenofibrate (TRIGLIDE) 160 MG tablet Take 1 tablet (160 mg total) by mouth daily. 90 tablet 2     finasteride (PROSCAR) 5 mg tablet TAKE 1 TABLET BY MOUTH EVERY DAY 90 tablet 3     FLUoxetine (PROZAC) 10 MG capsule TAKE 1 CAPSULE BY MOUTH EVERY DAY 90 capsule 2     fluticasone (FLONASE) 50 mcg/actuation nasal spray 1 spray into each nostril 2 times a day at 6:00 am and 4:00 pm. 16 g 6     FOLIC ACID/MULTIVIT-MIN/LUTEIN (CENTRUM SILVER ORAL) Take 1 tablet by mouth daily.       glipiZIDE (GLUCOTROL XL) 2.5 MG 24 hr tablet TAKE 1 TABLET (2.5 MG TOTAL) BY MOUTH DAILY. 90 tablet 0     HYDROcodone-acetaminophen 5-325 mg per tablet Take 1 tablet by mouth every 6 (six) hours as needed for pain. 60 tablet 0     LANCETS MISC Use As Directed.       metFORMIN (GLUCOPHAGE) 1000 MG tablet TAKE 1 TABLET BY MOUTH TWICE A DAY WITH MEALS 180 tablet 3     metoprolol succinate (TOPROL-XL) 25 MG TAKE 1 TABLET BY MOUTH EVERY DAY 90 tablet 1     pregabalin (LYRICA) 50 MG capsule Take 1 capsule (50 mg total) by mouth at bedtime. 90 capsule 3     ranitidine (ZANTAC) 150 MG tablet TAKE ONE TABLET BY MOUTH TWICE DAILY 180 tablet 2     tamsulosin (FLOMAX) 0.4 mg cap TAKE 1 CAPSULE BY MOUTH EVERY DAY 90 capsule 2     traZODone (DESYREL) 100 MG tablet TAKE 1 TABLET BY MOUTH EVERYDAY AT BEDTIME 90 tablet 3     white petrolatum-mineral oil (ARTIFICIAL TEARS, CARISSA/MIN,) 83-15 % Oint Use 1cm ribbon on lower lids nightly and in the am. 3.5 g 12     amoxicillin (AMOXIL) 500 MG capsule TAKE 4 CAPSULES BY MOUTH 1 HOUR BEFORE DENTAL APPOINTMENT 4 capsule 1     No current facility-administered medications for this visit.      Social History     Tobacco Use   Smoking Status Never Smoker   Smokeless Tobacco Never Used       OBJECTICE: /60   Pulse 60   Temp 98.6  F (37  C) (Oral)   Resp 20   Ht 5' 10\" (1.778 m)   Wt 198 lb (89.8 kg)   BMI 28.41 kg/m       No results found for this or any " previous visit (from the past 24 hour(s)).     GEN-alert, in no acute distress   Musculoskeletal-mild tenderness with palpation of the right foot arch.  No tenderness to palpation of the area of insertion of the plantar fascia on his heel.  Raised firm bump over the shin as detailed above.   CV-regular rate and rhythm       Francisco Javier Rojas

## 2021-06-14 NOTE — PROGRESS NOTES
Assessment/Plan:   Sinusitis, acute  Progressive URI and cough, sinus pain.  No wheeze or SOB, no fever.  Lungs clear  - amoxicillin-clavulanate (AUGMENTIN) 875-125 mg per tablet; Take 1 tablet by mouth 2 (two) times a day for 10 days.  Dispense: 20 tablet; Refill: 0    Fluids, rest, steam, nasal saline  Augmentin twice a day for 10 days  Eat yogurt or take probiotics  Follow up as needed.      Subjective:      Nestor Peterson is a 77 y.o. male who presents with worsening cough and congestion. He developed URI more than a week ago.  He has developed more congestion and ringing in his ears.  HA and face pain.  Cough worse at night.  No wheeze or shortness of breath. No fever.  No CP, no leg swelling or pain. No N/V/D or vertigo.  Cold and cough medications not helping much.  Reviewed his chart - he had similar presentation last fall as well.  Responded well to the antibiotic given.      Allergies   Allergen Reactions     Codeine      Current Outpatient Prescriptions on File Prior to Visit   Medication Sig Dispense Refill     acetaminophen 500 mg TbEF Take 1-2 tablets by mouth every 6 (six) hours as needed.       amoxicillin (AMOXIL) 500 MG capsule TAKE 4 CAPSULES BY MOUTH 1 HOUR BEFORE DENTAL APPOINTMENT 4 capsule 1     aspirin 81 MG tablet Take 81 mg by mouth daily.       blood glucose test (ACCU-CHEK DAISHA PLUS TEST STRP) strips Test once daily. 100 strip 11     cetirizine (ZYRTEC) 10 MG tablet Take 10 mg by mouth daily as needed for allergies.        cholecalciferol, vitamin D3, 1,000 unit tablet Take 1,000 Units by mouth daily.       diclofenac (VOLTAREN) 75 MG EC tablet Take 75 mg by mouth daily.        donepezil (ARICEPT) 10 MG tablet Take 1 tablet (10 mg total) by mouth daily. 30 tablet 11     donepezil (ARICEPT) 5 MG tablet Take 1 tablet (5 mg total) by mouth bedtime. 30 tablet 11     fenofibrate (TRIGLIDE) 160 MG tablet Take 1 tablet (160 mg total) by mouth daily. 90 tablet 3     FLUoxetine (PROZAC) 10 MG  capsule Take 1 capsule (10 mg total) by mouth daily. 30 capsule 6     fluticasone (FLONASE) 50 mcg/actuation nasal spray 1 spray into each nostril 2 times a day at 6:00 am and 4:00 pm. 16 g 6     FOLIC ACID/MULTIVIT-MIN/LUTEIN (CENTRUM SILVER ORAL) Take 1 tablet by mouth daily.       FOLIC ACID/MV,FE,OTHER MIN (CENTRUM ORAL) Take 1 tablet by mouth daily.       ginseng 100 mg cap Take 1 tablet by mouth daily.       HYDROcodone-acetaminophen 5-325 mg per tablet Take 1 tablet by mouth every 6 (six) hours as needed for pain. 60 tablet 0     LANCETS MISC Use As Directed.       LYRICA 50 mg capsule TAKE 1 CAPSULE (50 MG TOTAL) BY MOUTH 3 (THREE) TIMES A DAY. 90 capsule 2     magnesium oxide 500 mg Tab Take 500 mg by mouth daily.       metFORMIN (GLUCOPHAGE) 1000 MG tablet TAKE ONE TABLET BY MOUTH TWICE DAILY WITH MEALS 60 tablet 4     metoprolol succinate (TOPROL-XL) 25 MG Take 1 tablet (25 mg total) by mouth daily. 90 tablet 3     mupirocin (BACTROBAN) 2 % ointment Apply topically 2 (two) times a day. 22 g 1     OMEGA-3/DHA/EPA/FISH OIL (FISH OIL-OMEGA-3 FATTY ACIDS) 300-1,000 mg capsule Take 2 g by mouth daily.       promethazine-dextromethorphan (PROMETHAZINE-DM) 6.25-15 mg/5 mL syrup Take 5 mL by mouth every 6 (six) hours as needed for cough. 118 mL 0     ranitidine (ZANTAC) 150 MG tablet TAKE ONE TABLET BY MOUTH TWICE DAILY 180 tablet 3     tamsulosin (FLOMAX) 0.4 mg Cp24 TAKE 1 CAPSULE (0.4 MG TOTAL) BY MOUTH DAILY. 90 capsule 2     traZODone (DESYREL) 150 MG tablet Take 0.5 tablets (75 mg total) by mouth at bedtime. 90 tablet 1     white petrolatum-mineral oil (ARTIFICIAL TEARS, CARISSA/MIN,) 83-15 % Oint Use 1cm ribbon on lower lids nightly and in the am. 3.5 g 12     ascorbic acid (ASCORBIC ACID WITH LISBET HIPS) 500 MG tablet Take 500 mg by mouth daily.       buPROPion (WELLBUTRIN XL) 150 MG 24 hr tablet Take 1 tablet (150 mg total) by mouth daily. 30 tablet 11     glipiZIDE (GLUCOTROL XL) 2.5 MG 24 hr tablet Take 1  tablet (2.5 mg total) by mouth daily. 90 tablet 3     SAW PALMETTO ORAL Take 1 capsule by mouth daily.       No current facility-administered medications on file prior to visit.      Patient Active Problem List   Diagnosis     Male Erectile Disorder     Cerumen Impaction In The Right Ear     Tinea Cruris     Tinea Pedis     Obesity     Left Ventricular Hypertrophy     Constipation     Benign Prostatic Hypertrophy     Allergies     Abnormal Electrocardiogram     Memory Lapses Or Loss     Aortic Stenosis     Type II diabetes mellitus     Chronic Major Depression     Chronic Kidney Disease, Stage 3     Acute Sinusitis     Depression     Intermittent Claudication     Benign Adenomatous Polyp Of The Large Intestine     Lumbar Radiculopathy     Esophageal Reflux     Benign Essential Hypertension     Insomnia     Hyperlipidemia     Localized Osteoarthritis Of The Knee     Lumbar Disc Degeneration     Vitamin D Deficiency     Actinic Keratosis     S/P AVR     Neurocognitive disorder     Coronary artery disease     Lewy body disease     Dementia       Objective:     /80 (Patient Site: Right Arm, Patient Position: Sitting, Cuff Size: Adult Regular)  Pulse 64  Temp 98.4  F (36.9  C) (Oral)   Resp 18  Wt 211 lb (95.7 kg)  SpO2 92%  BMI 29.43 kg/m2    Physical  General Appearance: Alert, cooperative, no distress  Head: Normocephalic, without obvious abnormality, atraumatic  Eyes: Conjunctivae are normal.   Ears: Normal TMs and external ear canals, both ears  Nose: congestion with red swollen turbinates and yellow crusting.  Tender sinuses.   Throat: Throat is normal.  No exudate.  No significant lesions  Neck: No adenopathy  Lungs: Clear to auscultation bilaterally, respirations unlabored  Heart: Regular rate and rhythm  Skin:  no rashes or lesions  Psychiatric: Patient has a normal mood and affect.

## 2021-06-14 NOTE — TELEPHONE ENCOUNTER
RN cannot approve Refill Request    RN can NOT refill this medication PCP messaged that patient is overdue for Labs. Last office visit: 11/10/2020 Francisco Javier Rojas MD Last Physical: 11/4/2019 Last MTM visit: Visit date not found Last visit same specialty: 11/10/2020 Francisco Javier Rojas MD.  Next visit within 3 mo: Visit date not found  Next physical within 3 mo: Visit date not found      Ania Treadwell, [facility] Triage/Med Refill 1/17/2021    Requested Prescriptions   Pending Prescriptions Disp Refills     glipiZIDE (GLUCOTROL XL) 2.5 MG 24 hr tablet [Pharmacy Med Name: GLIPIZIDE ER 2.5 MG TABLET] 90 tablet 0     Sig: TAKE 1 TABLET (2.5 MG TOTAL) BY MOUTH DAILY.       Oral Hypoglycemics Refill Protocol Failed - 1/17/2021 12:37 AM        Failed - Microalbumin in last year      Microalbumin, Random Urine   Date Value Ref Range Status   11/04/2019 5.13 (H) 0.00 - 1.99 mg/dL Final                  Failed - Serum creatinine in last year     Creatinine   Date Value Ref Range Status   01/14/2020 1.19 0.70 - 1.30 mg/dL Final             Passed - Visit with PCP or prescribing provider visit in last 6 months       Last office visit with prescriber/PCP: 11/10/2020 OR same dept: 11/10/2020 Francisco Javier Rojas MD OR same specialty: 11/10/2020 Francisco Javier Rojas MD Last physical: Visit date not found Last MTM visit: Visit date not found         Next appt within 3 mo: Visit date not found  Next physical within 3 mo: Visit date not found  Prescriber OR PCP: Francisco Javier Rojas MD  Last diagnosis associated with med order: 1. Type 2 diabetes mellitus without complication, without long-term current use of insulin (H)  - glipiZIDE (GLUCOTROL XL) 2.5 MG 24 hr tablet [Pharmacy Med Name: GLIPIZIDE ER 2.5 MG TABLET]; TAKE 1 TABLET (2.5 MG TOTAL) BY MOUTH DAILY.  Dispense: 90 tablet; Refill: 0    2. BPH (benign prostatic hyperplasia)  - tamsulosin (FLOMAX) 0.4 mg cap [Pharmacy Med Name: TAMSULOSIN HCL 0.4 MG CAPSULE]; TAKE 1 CAPSULE BY MOUTH EVERY  DAY  Dispense: 90 capsule; Refill: 2     If protocol passes may refill for 12 months if within 3 months of last provider visit (or a total of 15 months).           Passed - A1C in last 6 months     Hemoglobin A1c   Date Value Ref Range Status   10/13/2020 6.7 (H) <=5.6 % Final     Comment:     Normal <5.7% Prediabete 5.7-6.4% Diabletes 6.5% or higher - adopted from ADA consensus guidelines               Passed - Blood pressure in last year     BP Readings from Last 1 Encounters:   11/10/20 118/60                tamsulosin (FLOMAX) 0.4 mg cap [Pharmacy Med Name: TAMSULOSIN HCL 0.4 MG CAPSULE] 90 capsule 2     Sig: TAKE 1 CAPSULE BY MOUTH EVERY DAY       Alfuzosin/Tamsulosin/Silodosin Refill Protocol  Passed - 1/17/2021 12:37 AM        Passed - PCP or prescribing provider visit in past 12 months       Last office visit with prescriber/PCP: 11/10/2020 Francisco Javier Rojas MD OR same dept: 11/10/2020 Francisco Javier Rojas MD OR same specialty: 11/10/2020 Francisco Javier Rojas MD  Last physical: 11/4/2019 Last MTM visit: Visit date not found   Next visit within 3 mo: Visit date not found  Next physical within 3 mo: Visit date not found  Prescriber OR PCP: Francisco Javier Rojas MD  Last diagnosis associated with med order: 1. Type 2 diabetes mellitus without complication, without long-term current use of insulin (H)  - glipiZIDE (GLUCOTROL XL) 2.5 MG 24 hr tablet [Pharmacy Med Name: GLIPIZIDE ER 2.5 MG TABLET]; TAKE 1 TABLET (2.5 MG TOTAL) BY MOUTH DAILY.  Dispense: 90 tablet; Refill: 0    2. BPH (benign prostatic hyperplasia)  - tamsulosin (FLOMAX) 0.4 mg cap [Pharmacy Med Name: TAMSULOSIN HCL 0.4 MG CAPSULE]; TAKE 1 CAPSULE BY MOUTH EVERY DAY  Dispense: 90 capsule; Refill: 2    If protocol passes may refill for 12 months if within 3 months of last provider visit (or a total of 15 months).

## 2021-06-14 NOTE — TELEPHONE ENCOUNTER
RN cannot approve Refill Request    RN can NOT refill this medication Protocol failed and NO refill given. Last office visit: 11/10/2020 Francisco Javier Rojas MD Last Physical: 11/4/2019 Last MTM visit: Visit date not found Last visit same specialty: 11/10/2020 Frnacisco Javier Rojas MD.  Next visit within 3 mo: Visit date not found  Next physical within 3 mo: Visit date not found      Ania Treadwell, [facility] Triage/Med Refill 1/3/2021    Requested Prescriptions   Pending Prescriptions Disp Refills     finasteride (PROSCAR) 5 mg tablet [Pharmacy Med Name: FINASTERIDE 5 MG TABLET] 90 tablet 3     Sig: TAKE 1 TABLET BY MOUTH EVERY DAY       There is no refill protocol information for this order

## 2021-06-15 PROBLEM — H90.3 SENSORINEURAL HEARING LOSS (SNHL) OF BOTH EARS: Chronic | Status: ACTIVE | Noted: 2021-06-15

## 2021-06-15 PROBLEM — G47.33 OSA (OBSTRUCTIVE SLEEP APNEA): Status: ACTIVE | Noted: 2021-06-15

## 2021-06-15 NOTE — PROGRESS NOTES
Assessment and Plan:     1. Routine general medical examination at a health care facility  Age-appropriate anticipatory guidance and counseling done.  Medication reconciliation and review and counseling done with the patient and his son.  Honoring choices document given to be completed and returned.    2. Type 2 diabetes mellitus with stage 3 chronic kidney disease, with long-term current use of insulin, unspecified whether stage 3a or 3b CKD (H)  Stable, well-controlled, continue current treatment plan.  - Glycosylated Hemoglobin A1c  - Basic Metabolic Panel    3. Coronary artery disease involving native heart without angina pectoris, unspecified vessel or lesion type  Given age and desire to reduce medication burden we decided to discontinue the antilipid therapy at this time.    4. Lewy body dementia with behavioral disturbance (H)  Stable, continue current plan.    5. Bilateral hearing loss, unspecified hearing loss type  - Ambulatory referral to Audiology  - Ambulatory referral to ENT     The patient's current medical problems were reviewed.    I have had an Advance Directives discussion with the patient.  The following health maintenance schedule was reviewed with the patient and provided in printed form in the after visit summary:   Health Maintenance Due   Topic Date Due     DEPRESSION ACTION PLAN  1940     HEPATITIS C SCREENING  1940     ZOSTER VACCINES (2 of 3) 02/29/2008     LIPID  11/24/2015     DIABETIC EYE EXAM  06/25/2019     DIABETIC FOOT EXAM  02/13/2020     MICROALBUMIN  11/04/2020     BMP  01/14/2021        Subjective:   Chief Complaint: Nestor Peterson is an 80 y.o. male here for an Annual Wellness visit.   HPI: 80-year-old male here for his annual wellness visit.  His son is with him.  Main concern from the patient and family is that he is progressively worse in his hard of hearing.  Disruptive to the patient, especially when his wife is not available to help him with things.  His  son also has concerns about some raised brown spots diffusely on the back that the patient scratches at.  Patient and family would like to eliminate any medications that are no longer necessary given his age.    Review of Systems: No chest pain, no shortness of breath.  Please see above.  The rest of the review of systems are negative for all systems.    Patient Care Team:  Francisco Javier Rojas MD as PCP - General (Family Medicine)  Francisco Javier Rojas MD as Assigned PCP  Janette Honeycutt, PharmD as Pharmacist (Pharmacist)  Jacqueline Pugh, PharmD as Pharmacist (Pharmacist)  Anastasiia Hu MD as Assigned Heart and Vascular Provider     Patient Active Problem List   Diagnosis     Male Erectile Disorder     Cerumen Impaction In The Right Ear     Tinea Cruris     Tinea Pedis     Obesity     Left Ventricular Hypertrophy     Constipation     Benign prostatic hyperplasia with post-void dribbling     Seasonal allergic rhinitis due to pollen     Abnormal Electrocardiogram     Memory Lapses Or Loss     Aortic Stenosis     Type II diabetes mellitus (H)     Chronic Major Depression     Chronic Kidney Disease, Stage 3     Acute Sinusitis     Depression     Intermittent Claudication     Benign Adenomatous Polyp Of The Large Intestine     Lumbar Radiculopathy     Esophageal Reflux     Benign Essential Hypertension     Insomnia     Other hyperlipidemia     Localized Osteoarthritis Of The Knee     Lumbar Disc Degeneration     Vitamin D Deficiency     Actinic Keratosis     S/P AVR     Neurocognitive disorder     Coronary artery disease     Lewy body disease (H)     Dementia (H)     Primary osteoarthritis involving multiple joints     Past Medical History:   Diagnosis Date     Aortic stenosis      BPH (benign prostatic hyperplasia)      Chronic back pain      Chronic kidney disease      Degenerative joint disease      Dementia (H)      Depression      Diabetes mellitus (H)     type 2     Esophageal reflux      Fatigue       Hyperlipidemia      Hypertension      Left ventricular hypertrophy      Lumbar radiculopathy      Male erectile disorder      Obesity      ZAKIA on CPAP     machine broke at present     Short-term memory loss       Past Surgical History:   Procedure Laterality Date     APPENDECTOMY       CARDIAC CATHETERIZATION  6/26/14     CATARACT EXTRACTION Bilateral      CHOLECYSTECTOMY       ID APPENDECTOMY      Description: Appendectomy;  Recorded: 12/17/2009;     ID KNEE SCOPE,DIAGNOSTIC      Description: Arthroscopy Knee Left;  Proc Date: 08/12/2010;  Comments: Dr Daily     ID REMOVAL GALLBLADDER      Description: Cholecystectomy;  Recorded: 12/17/2009;      Family History   Problem Relation Age of Onset     Diabetes Brother       Social History     Socioeconomic History     Marital status:      Spouse name: Not on file     Number of children: Not on file     Years of education: Not on file     Highest education level: Not on file   Occupational History     Not on file   Social Needs     Financial resource strain: Not on file     Food insecurity     Worry: Not on file     Inability: Not on file     Transportation needs     Medical: Not on file     Non-medical: Not on file   Tobacco Use     Smoking status: Never Smoker     Smokeless tobacco: Never Used   Substance and Sexual Activity     Alcohol use: No     Drug use: No     Sexual activity: Never   Lifestyle     Physical activity     Days per week: Not on file     Minutes per session: Not on file     Stress: Not on file   Relationships     Social connections     Talks on phone: Not on file     Gets together: Not on file     Attends Orthodox service: Not on file     Active member of club or organization: Not on file     Attends meetings of clubs or organizations: Not on file     Relationship status: Not on file     Intimate partner violence     Fear of current or ex partner: Not on file     Emotionally abused: Not on file     Physically abused: Not on file     Forced  "sexual activity: Not on file   Other Topics Concern     Not on file   Social History Narrative     Not on file      Current Outpatient Medications   Medication Sig Dispense Refill     ACCU-CHEK DAISHA PLUS TEST STRP strips USE TO TEST BLOOD SUGAR ONCE A DAY AS DIRECTED 100 strip 3     aspirin 81 MG tablet Take 81 mg by mouth daily.       cholecalciferol, vitamin D3, 1,000 unit tablet Take 1,000 Units by mouth daily.       donepeziL (ARICEPT) 5 MG tablet TAKE 1 TABLET BY MOUTH EVERYDAY AT BEDTIME. 90 tablet 3     finasteride (PROSCAR) 5 mg tablet TAKE 1 TABLET BY MOUTH EVERY DAY 90 tablet 3     FLUoxetine (PROZAC) 10 MG capsule TAKE 1 CAPSULE BY MOUTH EVERY DAY 90 capsule 3     fluticasone (FLONASE) 50 mcg/actuation nasal spray 1 spray into each nostril 2 times a day at 6:00 am and 4:00 pm. 16 g 6     FOLIC ACID/MULTIVIT-MIN/LUTEIN (CENTRUM SILVER ORAL) Take 1 tablet by mouth daily.       glipiZIDE (GLUCOTROL XL) 2.5 MG 24 hr tablet TAKE 1 TABLET (2.5 MG TOTAL) BY MOUTH DAILY. 90 tablet 0     LANCETS MISC Use As Directed.       metFORMIN (GLUCOPHAGE) 1000 MG tablet TAKE 1 TABLET BY MOUTH TWICE A DAY WITH MEALS 180 tablet 3     metoprolol succinate (TOPROL-XL) 25 MG TAKE 1 TABLET BY MOUTH EVERY DAY 90 tablet 1     pregabalin (LYRICA) 50 MG capsule Take 1 capsule (50 mg total) by mouth at bedtime. 90 capsule 3     tamsulosin (FLOMAX) 0.4 mg cap TAKE 1 CAPSULE BY MOUTH EVERY DAY 90 capsule 2     traZODone (DESYREL) 100 MG tablet TAKE 1 TABLET BY MOUTH EVERYDAY AT BEDTIME 90 tablet 3     white petrolatum-mineral oil (ARTIFICIAL TEARS, CARISSA/MIN,) 83-15 % Oint Use 1cm ribbon on lower lids nightly and in the am. 3.5 g 12     No current facility-administered medications for this visit.       Objective:   Vital Signs:   Visit Vitals  /56 (Patient Site: Left Arm, Patient Position: Sitting, Cuff Size: Adult Large)   Pulse 62   Temp 97.7  F (36.5  C) (Oral)   Ht 5' 10\" (1.778 m)   Wt 201 lb (91.2 kg)   SpO2 95%   BMI 28.84 " kg/m           VisionScreening:  No exam data present     PHYSICAL EXAM  General-alert, hard of hearing, in no acute distress  ENT-no cerumen impaction, I can see back to the TM on both sides.  Cardiac-regular rate and rhythm  Respiratory-lungs clear to auscultation  Skin-scattered seborrheic keratoses, no atypical lesions seen.  Foot exam-normal.  Palpable pedal pulses bilaterally.  No ulcers.  Fungal thickening of the nails of the toes diffusely.    Assessment Results 3/8/2021   Activities of Daily Living No help needed   Instrumental Activities of Daily Living 1 - Full function   Get Up and Go Score 12 seconds or more   Mini Cog Total Score 3   Some recent data might be hidden     A Mini-Cog score of 0-2 suggests the possibility of dementia, score of 3-5 suggests no dementia    Identified Health Risks:     The patient was provided with suggestions to help him develop a healthy physical lifestyle.   He is at risk for lack of exercise and has been provided with information to increase physical activity for the benefit of his well-being.  The patient was counseled and encouraged to consider modifying their diet and eating habits. He was provided with information on recommended healthy diet options.  The patient reports that he has difficulty with instrumental activities of daily living.  He was provided with a list of local organizations that provide support services and advised to make a follow up appointment in prn weeks to address this further.   The patient was provided with written information regarding signs of hearing loss.  Information on urinary incontinence and treatment options given to patient.  The patient was provided with suggestions to help him develop a healthy emotional lifestyle.   The patient s PHQ-9 score is consistent with moderate depression.  He was provided with information regarding depression and was advised to schedule a follow up appointment in prn weeks to further address this  issue.  He is at risk for falling and has been provided with information to reduce the risk of falling at home.  Information regarding advance directives (living [place]), including where he can download the appropriate form, was provided to the patient via the AVS.

## 2021-06-15 NOTE — TELEPHONE ENCOUNTER
RN cannot approve Refill Request    RN can NOT refill this medication Protocol failed and NO refill given. Last office visit: 11/10/2020 Francisc oJavier Rojas MD Last Physical: 11/4/2019 Last MTM visit: Visit date not found Last visit same specialty: 11/10/2020 Francisco Javier Rojas MD.  Next visit within 3 mo: Visit date not found  Next physical within 3 mo: Visit date not found      Sid Christina, [facility] Triage/Med Refill 2/17/2021    Requested Prescriptions   Pending Prescriptions Disp Refills     metFORMIN (GLUCOPHAGE) 1000 MG tablet [Pharmacy Med Name: METFORMIN HCL 1,000 MG TABLET] 180 tablet 3     Sig: TAKE 1 TABLET BY MOUTH TWICE A DAY WITH MEALS       Metformin Refill Protocol Failed - 2/17/2021 12:22 AM        Failed - LFT or AST or ALT in last 12 months     Albumin   Date Value Ref Range Status   01/14/2020 3.9 3.5 - 5.0 g/dL Final     Bilirubin, Total   Date Value Ref Range Status   01/14/2020 0.5 0.0 - 1.0 mg/dL Final     Alkaline Phosphatase   Date Value Ref Range Status   01/14/2020 52 45 - 120 U/L Final     AST   Date Value Ref Range Status   01/14/2020 21 0 - 40 U/L Final     ALT   Date Value Ref Range Status   01/14/2020 18 0 - 45 U/L Final     Protein, Total   Date Value Ref Range Status   01/14/2020 7.2 6.0 - 8.0 g/dL Final                Failed - GFR or Serum Creatinine in last 6 months     GFR MDRD Non Af Amer   Date Value Ref Range Status   01/14/2020 59 (L) >60 mL/min/1.73m2 Final     GFR MDRD Af Amer   Date Value Ref Range Status   01/14/2020 >60 >60 mL/min/1.73m2 Final             Failed - Microalbumin in last year      Microalbumin, Random Urine   Date Value Ref Range Status   11/04/2019 5.13 (H) 0.00 - 1.99 mg/dL Final                  Passed - Blood pressure in last 12 months     BP Readings from Last 1 Encounters:   11/10/20 118/60             Passed - Visit with PCP or prescribing provider visit in last 6 months or next 3 months     Last office visit with prescriber/PCP: 11/10/2020 OR same  dept: 11/10/2020 Francisco Javier Rojas MD OR same specialty: 11/10/2020 Francisco Javier Rojas MD Last physical: Visit date not found Last MTM visit: Visit date not found         Next appt within 3 mo: Visit date not found  Next physical within 3 mo: Visit date not found  Prescriber OR PCP: Francisco Javier Rojas MD  Last diagnosis associated with med order: 1. DM2 (diabetes mellitus, type 2) (H)  - metFORMIN (GLUCOPHAGE) 1000 MG tablet [Pharmacy Med Name: METFORMIN HCL 1,000 MG TABLET]; TAKE 1 TABLET BY MOUTH TWICE A DAY WITH MEALS  Dispense: 180 tablet; Refill: 3     If protocol passes may refill for 12 months if within 3 months of last provider visit (or a total of 15 months).           Passed - A1C in last 6 months     Hemoglobin A1c   Date Value Ref Range Status   10/13/2020 6.7 (H) <=5.6 % Final     Comment:     Normal <5.7% Prediabete 5.7-6.4% Diabletes 6.5% or higher - adopted from ADA consensus guidelines

## 2021-06-16 PROBLEM — M15.0 PRIMARY OSTEOARTHRITIS INVOLVING MULTIPLE JOINTS: Status: ACTIVE | Noted: 2018-07-16

## 2021-06-16 PROBLEM — F03.90 DEMENTIA (H): Status: ACTIVE | Noted: 2017-06-19

## 2021-06-16 NOTE — PROGRESS NOTES
"ASSESMENT AND PLAN:  Diagnoses and all orders for this visit:    1. Rhinitis  -  Clinically no concerns for sinus infection.  Pt has not been taking his Flonase for awhile, encourage to use again to see if it helps.  Possible SE from Flomax.   -  Follow up if symptoms not better or worsens.    Refill:  -     fluticasone (FLONASE) 50 mcg/actuation nasal spray; 1 spray into each nostril 2 times a day at 6:00 am and 4:00 pm.  Dispense: 16 g; Refill: 6    2. BPH (benign prostatic hyperplasia)  -  Controlled with current med, needs refills.   Refill:  -     tamsulosin (FLOMAX) 0.4 mg Cp24; Take 1 capsule (0.4 mg total) by mouth daily.  Dispense: 90 capsule; Refill: 2    Pt is brought in by his Wife. Over 25 minutes total time spent with patient, with more than 50% in counseling and coordination of care on the above issues.   Reviewed Medical/Social history and Medications.  No new changes.   Discussed indications for emergent medical attention and routine F/u.  Patient understands and agrees with treatment plan.     SUBJECTIVE:  Nestor Peterson is a 77 y.o. Male with hx of BPH, HTN, Dementia, DM who presents with rhinitis x 3-4 weeks.     Pt is present with wife and states he has been having nasal congestion for the past several weeks but not getting worse.  Denies sinus pressure, fever/chills, wheezing, SOB, CP, n/v.   Pt has had rhinitis in the past and was using Flonase, but has stopped taking it for sometime.  Wife states that she always reminds him but he doesn't take it, \"He has dementia so he doesn't remember.\"  Pt states he didn't know he had the medication.  Encouraged wife to help Pt with his meds.   Discussed rhinitis may be a SE from using Flomax, but Pt should continue using it for his BPH.  Pt and wife are john couple who pokes fun at each other in a loving way throughout visit.  Pt is very calm and gentleman-like.     Pt also complains of increased urination throughout day.  Discussed his BPH meds may be " "helping him urinate more easily.   Pt and wife states he has always had some urinary problem,   \"I tell him to wear his Depends but he doesn't want to.\"   Denies any new sxs.   Discussed and reviewed Pt's labs and DM hx, and appears his DM is well controlled.   Discussed healthy eating, low carb diet, and exercise.  Wife states they will be doing more walking once weather gets better and has apt with Dr. Rojas in April and will discuss his DM then.     ROS:  Comprehensive Review of Systems Negative except stated in HPI.     Past Medical History:   Diagnosis Date     Aortic stenosis      BPH (benign prostatic hyperplasia)      Chronic back pain      Chronic kidney disease      Degenerative joint disease      Dementia      Depression      Diabetes mellitus     type 2     Esophageal reflux      Fatigue      Hyperlipidemia      Hypertension      Left ventricular hypertrophy      Lumbar radiculopathy      Male erectile disorder      Obesity      ZAKIA on CPAP     machine broke at present     Short-term memory loss      Patient Active Problem List   Diagnosis     Male Erectile Disorder     Cerumen Impaction In The Right Ear     Tinea Cruris     Tinea Pedis     Obesity     Left Ventricular Hypertrophy     Constipation     Benign Prostatic Hypertrophy     Allergies     Abnormal Electrocardiogram     Memory Lapses Or Loss     Aortic Stenosis     Type II diabetes mellitus     Chronic Major Depression     Chronic Kidney Disease, Stage 3     Acute Sinusitis     Depression     Intermittent Claudication     Benign Adenomatous Polyp Of The Large Intestine     Lumbar Radiculopathy     Esophageal Reflux     Benign Essential Hypertension     Insomnia     Hyperlipidemia     Localized Osteoarthritis Of The Knee     Lumbar Disc Degeneration     Vitamin D Deficiency     Actinic Keratosis     S/P AVR     Neurocognitive disorder     Coronary artery disease     Lewy body disease     Dementia     Current Outpatient Prescriptions   Medication " Sig Dispense Refill     amoxicillin (AMOXIL) 500 MG capsule TAKE 4 CAPSULES BY MOUTH 1 HOUR BEFORE DENTAL APPOINTMENT 4 capsule 1     ascorbic acid (ASCORBIC ACID WITH LISBET HIPS) 500 MG tablet Take 500 mg by mouth daily.       aspirin 81 MG tablet Take 81 mg by mouth daily.       blood glucose test (ACCU-CHEK DAISHA PLUS TEST STRP) strips Test once daily. 100 strip 11     cetirizine (ZYRTEC) 10 MG tablet Take 10 mg by mouth daily as needed for allergies.        cholecalciferol, vitamin D3, 1,000 unit tablet Take 1,000 Units by mouth daily.       diclofenac (VOLTAREN) 75 MG EC tablet Take 75 mg by mouth daily.        donepezil (ARICEPT) 10 MG tablet Take 1 tablet (10 mg total) by mouth daily. 30 tablet 1     donepezil (ARICEPT) 5 MG tablet TAKE 1 TABLET (5 MG TOTAL) BY MOUTH BEDTIME. 30 tablet 11     fenofibrate (TRIGLIDE) 160 MG tablet Take 1 tablet (160 mg total) by mouth daily. 90 tablet 3     FLUoxetine (PROZAC) 10 MG capsule Take 1 capsule (10 mg total) by mouth daily. 30 capsule 6     fluticasone (FLONASE) 50 mcg/actuation nasal spray 1 spray into each nostril 2 times a day at 6:00 am and 4:00 pm. 16 g 6     FOLIC ACID/MULTIVIT-MIN/LUTEIN (CENTRUM SILVER ORAL) Take 1 tablet by mouth daily.       FOLIC ACID/MV,FE,OTHER MIN (CENTRUM ORAL) Take 1 tablet by mouth daily.       ginseng 100 mg cap Take 1 tablet by mouth daily.       glipiZIDE (GLUCOTROL XL) 2.5 MG 24 hr tablet Take 1 tablet (2.5 mg total) by mouth daily. 90 tablet 3     HYDROcodone-acetaminophen 5-325 mg per tablet Take 1 tablet by mouth every 6 (six) hours as needed for pain. 60 tablet 0     LANCETS MISC Use As Directed.       LYRICA 50 mg capsule TAKE 1 CAPSULE (50 MG TOTAL) BY MOUTH 3 (THREE) TIMES A DAY. 90 capsule 2     magnesium oxide 500 mg Tab Take 500 mg by mouth daily.       metFORMIN (GLUCOPHAGE) 1000 MG tablet Take 1 tablet (1,000 mg total) by mouth 2 (two) times a day with meals. 180 tablet 1     metoprolol succinate (TOPROL-XL) 25 MG Take  "1 tablet (25 mg total) by mouth daily. 90 tablet 3     mupirocin (BACTROBAN) 2 % ointment Apply topically 2 (two) times a day. 22 g 1     OMEGA-3/DHA/EPA/FISH OIL (FISH OIL-OMEGA-3 FATTY ACIDS) 300-1,000 mg capsule Take 2 g by mouth daily.       promethazine-dextromethorphan (PROMETHAZINE-DM) 6.25-15 mg/5 mL syrup Take 5 mL by mouth every 6 (six) hours as needed for cough. 118 mL 0     ranitidine (ZANTAC) 150 MG tablet TAKE ONE TABLET BY MOUTH TWICE DAILY 180 tablet 0     SAW PALMETTO ORAL Take 1 capsule by mouth daily.       tamsulosin (FLOMAX) 0.4 mg Cp24 TAKE 1 CAPSULE (0.4 MG TOTAL) BY MOUTH DAILY. 90 capsule 2     traZODone (DESYREL) 150 MG tablet Take 0.5 tablets (75 mg total) by mouth at bedtime. 90 tablet 1     white petrolatum-mineral oil (ARTIFICIAL TEARS, CARISSA/MIN,) 83-15 % Oint Use 1cm ribbon on lower lids nightly and in the am. 3.5 g 12     buPROPion (WELLBUTRIN XL) 150 MG 24 hr tablet Take 1 tablet (150 mg total) by mouth daily. 30 tablet 11     No current facility-administered medications for this visit.      History   Smoking Status     Never Smoker   Smokeless Tobacco     Never Used     OBJECTIVE: /64  Pulse 71  Temp 98.2  F (36.8  C) (Oral)   Resp 16  Ht 5' 11\" (1.803 m)  Wt 216 lb (98 kg)  BMI 30.13 kg/m2   No results found for this or any previous visit (from the past 24 hour(s)).    PHYSICAL:  General Alert, awake, not in acute distress.   HEENT:             -Head Atraumatic, normocephalic.            -Eyes PERRL, no erythema, discharge, conjunctiva clear.             -Ears TMs intact, no drainage, no erythema.            -Nose    Nostrils patent bilaterally, mild erythema of Right nare.  No sinus tenderness.             -Throat Oropharynx without edema, erythema.  Uvula midline, no deviation.             -Neck Neck FROM, no adenopathy.  Thyroid not visibly enlarged.   CV Normal S1 & S2.  Difficult to assess for murmurs.    RESP Non-labored, RRR, CTAB. No wheezes or crackles.      "   Jonn Bustillo PA-C

## 2021-06-16 NOTE — TELEPHONE ENCOUNTER
RN cannot approve Refill Request    RN can NOT refill this medication PCP messaged that patient is overdue for Labs. Last office visit: 11/10/2020 Francisco Javier Rojas MD Last Physical: 3/8/2021 Last MTM visit: Visit date not found Last visit same specialty: 11/10/2020 Francisco Javier Rojas MD.  Next visit within 3 mo: Visit date not found  Next physical within 3 mo: Visit date not found      Ania Treadwell, [facility] Triage/Med Refill 4/13/2021    Requested Prescriptions   Pending Prescriptions Disp Refills     glipiZIDE (GLUCOTROL XL) 2.5 MG 24 hr tablet [Pharmacy Med Name: GLIPIZIDE ER 2.5 MG TABLET] 90 tablet 0     Sig: TAKE 1 TABLET (2.5 MG TOTAL) BY MOUTH DAILY.       Oral Hypoglycemics Refill Protocol Failed - 4/13/2021 12:20 AM        Failed - Microalbumin in last year      Microalbumin, Random Urine   Date Value Ref Range Status   11/04/2019 5.13 (H) 0.00 - 1.99 mg/dL Final                  Passed - Visit with PCP or prescribing provider visit in last 6 months       Last office visit with prescriber/PCP: 11/10/2020 OR same dept: 11/10/2020 Francisco Javier Rojas MD OR same specialty: 11/10/2020 Francisco Javier Rojas MD Last physical: 3/8/2021 Last MTM visit: Visit date not found         Next appt within 3 mo: Visit date not found  Next physical within 3 mo: Visit date not found  Prescriber OR PCP: Francisco Javier Rojas MD  Last diagnosis associated with med order: 1. Type 2 diabetes mellitus without complication, without long-term current use of insulin (H)  - glipiZIDE (GLUCOTROL XL) 2.5 MG 24 hr tablet [Pharmacy Med Name: GLIPIZIDE ER 2.5 MG TABLET]; TAKE 1 TABLET (2.5 MG TOTAL) BY MOUTH DAILY.  Dispense: 90 tablet; Refill: 0     If protocol passes may refill for 12 months if within 3 months of last provider visit (or a total of 15 months).           Passed - A1C in last 6 months     Hemoglobin A1c   Date Value Ref Range Status   03/08/2021 6.8 (H) <=5.6 % Final               Passed - Blood pressure in last year     BP Readings  from Last 1 Encounters:   03/26/21 132/68             Passed - Serum creatinine in last year     Creatinine   Date Value Ref Range Status   03/08/2021 1.43 (H) 0.70 - 1.30 mg/dL Final

## 2021-06-16 NOTE — TELEPHONE ENCOUNTER

## 2021-06-16 NOTE — PROGRESS NOTES
HPI: This patient is a 81yo M who presents for evaluation of hearing at the request of Dr. Rojas. There has been a gradual loss of hearing over >15 years in both ears. Denies otalgia, otorrhea, vertigo, and other major medical issues. 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 There is bilateral, non-pulsatile tinnitus, most noticeable when it is quiet. Says he has to have his ears cleaned out about once per year. Has some orthostatic lightheadedness, no otologic vertigo or oscillopsia.    Past medical history, surgical history, social history, family history, medications, and allergies have been reviewed with the patient and are documented above.    Review of Systems: a 10-system review was performed. Pertinent positives are noted in the HPI and on a separate scanned document in the chart.    PHYSICAL EXAMINATION:  GEN: no acute distress, normocephalic  EYES: extraocular movements are intact, pupils are equal and round. Sclera clear.   EARS: auricles are normally formed. The external auditory canals are cleared bilaterally of cerumen impactions with a loop under binocular microscopy. Tympanic membranes are intact bilaterally with no signs of infection, effusion, retractions, or perforations.  NOSE: anterior nares are patent.   OC/OP: clear, dentition is in good repair. The tongue and palate are fully mobile and symmetric. No masses or lesions.  NECK: soft and supple. No lymphadenopathy or masses. Airway is midline.  NEURO: CN VII and XII symmetric. alert and oriented. No spontaneous nystagmus. Gait is normal.  PULM: breathing comfortably on room air, normal chest expansion with respiration  CARDS: no cyanosis or clubbing, normal carotid pulses    AUDIOGRAM: symmetric moderate SNHL by PTA, Type A tymps, WRS 60/80    MEDICAL DECISION-MAKING: This patient is a 81yo M with bilateral sensorineural hearing loss. Discussed hearing protection. Medically clear for hearing aids  should the patient desire them. Ariann cleared today in clinic.

## 2021-06-17 NOTE — PROGRESS NOTES
ASSESMENT AND PLAN:  Diagnoses and all orders for this visit:    Type II diabetes mellitus  -     Glycosylated Hemoglobin A1c done today confirms ongoing good control.  Continue with current treatment plan and recheck again in 6 months.    Insomnia  Discussed the potential of increasing the trazodone.  We reviewed the risk benefit analysis of increased risk of sedation and drowsiness on higher doses but there is certainly some nights as detailed below where the half tablet does not seem to be adequate.  Therefore, we decided to try a trial of a full tablet on some nights to then determine if he is having any side effects or problems from the higher dose.    Memory Lapses Or Loss  Stable.    Chronic Kidney Disease, Stage 3  -     Comprehensive Metabolic Panel    Fatigue  Likely multifactorial and secondary to age, memory loss, medications, depression, deconditioning, and insomnia.  However, it has been a while since his last CBC so we are going to check to make sure he has not developed any significant anemia.  -     HM2(CBC w/o Differential)    Urinary incontinence  -     Urinalysis-UC if Indicated  Encourage the patient to start using pads, he is not interested in further workup or other oral medications at this time, he will continue to take the Flomax.          SUBJECTIVE: 78-year-old male who has memory loss comes in today with his wife.  Both the patient and his wife feel like his level of problems with his memory have been stable over the past 6 months.  They have not noticed any significant worsening.  He does have slow worsening of his bladder function.  He is getting some leaking of urine now almost every day.  Patient does have a known history of BPH and is on Flomax.  No burning with urination or gross hematuria.  Patient feels fatigued most days.  His wife reports that he is struggling more with his sleep.  He takes a half tablet of trazodone at bedtime and often is not falling asleep until midnight or 1  in the morning, he then wakes up anywhere between 6 in the morning and 9 in the morning.  The nights that he only gets 5-6 hours of sleep he does seem more low energy the following day.    Past Medical History:   Diagnosis Date     Aortic stenosis      BPH (benign prostatic hyperplasia)      Chronic back pain      Chronic kidney disease      Degenerative joint disease      Dementia      Depression      Diabetes mellitus     type 2     Esophageal reflux      Fatigue      Hyperlipidemia      Hypertension      Left ventricular hypertrophy      Lumbar radiculopathy      Male erectile disorder      Obesity      ZAKIA on CPAP     machine broke at present     Short-term memory loss      Patient Active Problem List   Diagnosis     Male Erectile Disorder     Cerumen Impaction In The Right Ear     Tinea Cruris     Tinea Pedis     Obesity     Left Ventricular Hypertrophy     Constipation     Benign Prostatic Hypertrophy     Allergies     Abnormal Electrocardiogram     Memory Lapses Or Loss     Aortic Stenosis     Type II diabetes mellitus     Chronic Major Depression     Chronic Kidney Disease, Stage 3     Acute Sinusitis     Depression     Intermittent Claudication     Benign Adenomatous Polyp Of The Large Intestine     Lumbar Radiculopathy     Esophageal Reflux     Benign Essential Hypertension     Insomnia     Hyperlipidemia     Localized Osteoarthritis Of The Knee     Lumbar Disc Degeneration     Vitamin D Deficiency     Actinic Keratosis     S/P AVR     Neurocognitive disorder     Coronary artery disease     Lewy body disease     Dementia     Current Outpatient Prescriptions   Medication Sig Dispense Refill     amoxicillin (AMOXIL) 500 MG capsule TAKE 4 CAPSULES BY MOUTH 1 HOUR BEFORE DENTAL APPOINTMENT 4 capsule 1     ascorbic acid (ASCORBIC ACID WITH LISBET HIPS) 500 MG tablet Take 500 mg by mouth daily.       aspirin 81 MG tablet Take 81 mg by mouth daily.       blood glucose test (ACCU-CHEK DAISHA PLUS TEST STRP) strips  Test once daily. 100 strip 11     cetirizine (ZYRTEC) 10 MG tablet Take 10 mg by mouth daily as needed for allergies.        cholecalciferol, vitamin D3, 1,000 unit tablet Take 1,000 Units by mouth daily.       diclofenac (VOLTAREN) 75 MG EC tablet Take 75 mg by mouth daily.        donepezil (ARICEPT) 10 MG tablet Take 1 tablet (10 mg total) by mouth daily. 30 tablet 1     donepezil (ARICEPT) 5 MG tablet TAKE 1 TABLET (5 MG TOTAL) BY MOUTH BEDTIME. 30 tablet 11     fenofibrate (TRIGLIDE) 160 MG tablet Take 1 tablet (160 mg total) by mouth daily. 90 tablet 3     FLUoxetine (PROZAC) 10 MG capsule Take 1 capsule (10 mg total) by mouth daily. 30 capsule 6     fluticasone (FLONASE) 50 mcg/actuation nasal spray 1 spray into each nostril 2 times a day at 6:00 am and 4:00 pm. 16 g 6     FOLIC ACID/MULTIVIT-MIN/LUTEIN (CENTRUM SILVER ORAL) Take 1 tablet by mouth daily.       FOLIC ACID/MV,FE,OTHER MIN (CENTRUM ORAL) Take 1 tablet by mouth daily.       ginseng 100 mg cap Take 1 tablet by mouth daily.       glipiZIDE (GLUCOTROL XL) 2.5 MG 24 hr tablet Take 1 tablet (2.5 mg total) by mouth daily. 90 tablet 3     HYDROcodone-acetaminophen 5-325 mg per tablet Take 1 tablet by mouth every 6 (six) hours as needed for pain. 60 tablet 0     LANCETS MISC Use As Directed.       LYRICA 50 mg capsule TAKE 1 CAPSULE (50 MG TOTAL) BY MOUTH 3 (THREE) TIMES A DAY. 90 capsule 2     magnesium oxide 500 mg Tab Take 500 mg by mouth daily.       metFORMIN (GLUCOPHAGE) 1000 MG tablet Take 1 tablet (1,000 mg total) by mouth 2 (two) times a day with meals. 180 tablet 1     metoprolol succinate (TOPROL-XL) 25 MG Take 1 tablet (25 mg total) by mouth daily. 90 tablet 3     mupirocin (BACTROBAN) 2 % ointment Apply topically 2 (two) times a day. 22 g 1     OMEGA-3/DHA/EPA/FISH OIL (FISH OIL-OMEGA-3 FATTY ACIDS) 300-1,000 mg capsule Take 2 g by mouth daily.       promethazine-dextromethorphan (PROMETHAZINE-DM) 6.25-15 mg/5 mL syrup Take 5 mL by mouth  "every 6 (six) hours as needed for cough. 118 mL 0     ranitidine (ZANTAC) 150 MG tablet TAKE ONE TABLET BY MOUTH TWICE DAILY 180 tablet 0     SAW PALMETTO ORAL Take 1 capsule by mouth daily.       tamsulosin (FLOMAX) 0.4 mg Cp24 Take 1 capsule (0.4 mg total) by mouth daily. 90 capsule 2     traZODone (DESYREL) 150 MG tablet Take 0.5 tablets (75 mg total) by mouth at bedtime. 90 tablet 1     white petrolatum-mineral oil (ARTIFICIAL TEARS, CARISSA/MIN,) 83-15 % Oint Use 1cm ribbon on lower lids nightly and in the am. 3.5 g 12     buPROPion (WELLBUTRIN XL) 150 MG 24 hr tablet Take 1 tablet (150 mg total) by mouth daily. 30 tablet 11     No current facility-administered medications for this visit.      History   Smoking Status     Never Smoker   Smokeless Tobacco     Never Used       OBJECTICE: /54  Pulse 64  Temp 98.5  F (36.9  C) (Oral)   Resp 16  Ht 5' 11\" (1.803 m)  Wt 212 lb 12 oz (96.5 kg) Comment: with shoe  SpO2 94%  BMI 29.67 kg/m2     Recent Results (from the past 24 hour(s))   Glycosylated Hemoglobin A1c    Collection Time: 04/05/18 10:35 AM   Result Value Ref Range    Hemoglobin A1c 6.8 (H) 3.5 - 6.0 %   Comprehensive Metabolic Panel    Collection Time: 04/05/18 10:35 AM   Result Value Ref Range    Sodium 139 136 - 145 mmol/L    Potassium 5.0 3.5 - 5.0 mmol/L    Chloride 103 98 - 107 mmol/L    CO2 25 22 - 31 mmol/L    Anion Gap, Calculation 11 5 - 18 mmol/L    Glucose 136 (H) 70 - 125 mg/dL    BUN 20 8 - 28 mg/dL    Creatinine 1.35 (H) 0.70 - 1.30 mg/dL    GFR MDRD Af Amer >60 >60 mL/min/1.73m2    GFR MDRD Non Af Amer 51 (L) >60 mL/min/1.73m2    Bilirubin, Total 0.5 0.0 - 1.0 mg/dL    Calcium 9.8 8.5 - 10.5 mg/dL    Protein, Total 7.1 6.0 - 8.0 g/dL    Albumin 3.5 3.5 - 5.0 g/dL    Alkaline Phosphatase 54 45 - 120 U/L    AST 24 0 - 40 U/L    ALT 23 0 - 45 U/L   HM2(CBC w/o Differential)    Collection Time: 04/05/18 10:35 AM   Result Value Ref Range    WBC 4.9 4.0 - 11.0 thou/uL    RBC 5.03 4.40 - " 6.20 mill/uL    Hemoglobin 16.6 14.0 - 18.0 g/dL    Hematocrit 48.2 40.0 - 54.0 %    MCV 96 80 - 100 fL    MCH 32.9 27.0 - 34.0 pg    MCHC 34.4 32.0 - 36.0 g/dL    RDW 11.8 11.0 - 14.5 %    Platelets 278 140 - 440 thou/uL    MPV 7.0 7.0 - 10.0 fL   Urinalysis-UC if Indicated    Collection Time: 04/05/18 11:17 AM   Result Value Ref Range    Color, UA Yellow Colorless, Yellow, Straw, Light Yellow    Clarity, UA Clear Clear    Glucose, UA Negative Negative    Bilirubin, UA Negative Negative    Ketones, UA Negative Negative    Specific Gravity, UA >=1.030 1.005 - 1.030    Blood, UA Negative Negative    pH, UA 5.0 5.0 - 8.0    Protein, UA Negative Negative mg/dL    Urobilinogen, UA 0.2 E.U./dL 0.2 E.U./dL, 1.0 E.U./dL    Nitrite, UA Negative Negative    Leukocytes, UA Negative Negative    Bacteria, UA Few (!) None Seen hpf    RBC, UA None Seen None Seen, 0-2 hpf    WBC, UA 0-5 None Seen, 0-5 hpf    Squam Epithel, UA 10-25 (!) None Seen, 0-5 lpf        GEN-alert, appropriate, in no apparent distress   HEENT-conjunctiva are normal, they are not pale, neck is supple, sclera are clear.   CV-regular rate and rhythm   RESP-lungs clear to auscultation   ABDOMINAL-soft and nontender to palpation   EXTREM-warm with no ankle edema   SKIN-no ulcers or vesicles      Francisco Javier Rojas

## 2021-06-17 NOTE — PROGRESS NOTES
"AUDIOLOGY REPORT    SUBJECTIVE: Nestor Peterson, 81 y.o.  year old male, was seen on 05/05/21 for a hearing aid evaluation. His hearing was assessed on 3/24/2021 and results revealed moderate gently sloping to moderately-severe sensorineural hearing loss bilaterally. Medical clearance was provided by Dr. Lucy Vines on 3/24/2021.     OBJECTIVE: I called Nestor yesterday and informed him that he has hearing aid benefits through [facility]. He was provided their phone number and was told that he may cancel today's appointment if he is interested in pursuing hearing aids through [facility].    Nestor arrived today with his son (who takes care of him). His son was confused because, \"someone called and was trying to get him [Nestor] to cancel his appointment.\" I explained that he has benefits through [facility] and may want to pursue hearing aids through them because they are less expensive. His son was given the phone number for [facility] and appreciated the clarification. He was also given a copy of his audiogram.     They would like to pursue hearing aids through [facility].       ASSESSMENT: Hearing aid check completed. He will pursue hearing aids through [facility]. No charge for today's appointment.       PLAN: Nestor will pursue hearing aids through [facility]. He is welcome to schedule another hearing aid evaluation at our clinic if he desires in the future.  Please contact our clinic at [phone] with questions or concerns.    Yadira Alcantara, CCC-A  Clinical Audiologist   [place] #[id number]     "

## 2021-06-17 NOTE — PATIENT INSTRUCTIONS - HE
Patient Instructions by Francisco Javier Rojas MD at 11/4/2019 10:20 AM     Author: Francisco Javier Rojas MD Service: -- Author Type: Physician    Filed: 11/4/2019 12:38 PM Encounter Date: 11/4/2019 Status: Signed    : Francisco Javier Rojas MD (Physician)         Patient Education     Exercise for a Healthier Heart  You may wonder how you can improve the health of your heart. If youre thinking about exercise, youre on the right track. You dont need to become an athlete, but you do need a certain amount of brisk exercise to help strengthen your heart. If you have been diagnosed with a heart condition, your doctor may recommend exercise to help stabilize your condition. To help make exercise a habit, choose safe, fun activities.       Be sure to check with your health care provider before starting an exercise program.    Why exercise?  Exercising regularly offers many healthy rewards. It can help you do all of the following:    Improve your blood cholesterol levels to help prevent further heart trouble    Lower your blood pressure to help prevent a stroke or heart attack    Control diabetes, or reduce your risk of getting this disease    Improve your heart and lung function    Reach and maintain a healthy weight    Make your muscles stronger and more limber so you can stay active    Prevent falls and fractures by slowing the loss of bone mass (osteoporosis)    Manage stress better  Exercise tips  Ease into your routine. Set small goals. Then build on them.  Exercise on most days. Aim for a total of 150 or more minutes of moderate to  vigorous intensity activity each week. Consider 40 minutes, 3 to 4 times a week. For best results, activity should last for 40 minutes on average. It is OK to work up to the 40 minute period over time. Examples of moderate-intensity activity is walking one mile in 15 minutes or 30 to 45 minutes of yard work.  Step up your daily activity level. Along with your exercise program, try being more active  throughout the day. Walk instead of drive. Do more household tasks or yard work.  Choose one or more activities you enjoy. Walking is one of the easiest things you can do. You can also try swimming, riding a bike, or taking an exercise class.  Stop exercising and call your doctor if you:    Have chest pain or feel dizzy or lightheaded    Feel burning, tightness, pressure, or heaviness in your chest, neck, shoulders, back, or arms    Have unusual shortness of breath    Have increased joint or muscle pain    Have palpitations or an irregular heartbeat      3761-3295 [employer]. [address] [place] [id number]. All rights reserved. This information is not intended as a substitute for professional medical care. Always follow your healthcare professional's instructions.         Patient Education   Understanding Nomorer[employer] MyPlate  The USDA (US Department of Agriculture) has guidelines to help you make healthy food choices. These are called MyPlate. MyPlate shows the food groups that make up healthy meals using the image of a place setting. Before you eat, think about the healthiest choices for what to put onto your plate or into your cup or bowl. To learn more about building a healthy plate, visit www.choosemyplate.gov.       The Food Groups    Fruits: Any fruit or 100% fruit juice counts as part of the Fruit Group. Fruits may be fresh, canned, frozen, or dried, and may be whole, cut-up, or pureed. Make half your plate fruits and vegetables.    Vegetables: Any vegetable or 100% vegetable juice counts as a member of the Vegetable Group. Vegetables may be fresh, frozen, canned, or dried. They can be served raw or cooked and may be whole, cut-up, or mashed. Make half your plate fruits and vegetables.     Grains: All foods made from grains are part of the Grains Group. These include wheat, rice, oats, cornmeal, and barley such as bread, pasta, oatmeal, cereal, tortillas, and grits. Grains should be no more  than a quarter of your plate. At least half of your grains should be whole grains.    Protein: This group includes meat, poultry, seafood, beans and peas, eggs, processed soy products (like tofu), nuts (including nut butters), and seeds. Make protein choices no more than a quarter of your plate. Meat and poultry choices should be lean or low fat.    Dairy: All fluid milk products and foods made from milk that contain calcium, like yogurt and cheese are part of the Dairy Group. (Foods that have little calcium, such as cream, butter, and cream cheese, are not part of the group.) Most dairy choices should be low-fat or fat-free.    Oils: These are fats that are liquid at room temperature. They include canola, corn, olive, soybean, and sunflower oil. Foods that are mainly oil include mayonnaise, certain salad dressings, and soft margarines. You should have only 5 to 7 teaspoons of oils a day. You probably already get this much from the food you eat.  Use Appetite+ to Help Build Your Meals  The [employer] can help you plan and track your meals and activity. You can look up individual foods to see or compare their nutritional value. You can get guidelines for what and how much you should eat. You can compare your food choices. And you can assess personal physical activities and see ways you can improve. Go to www.Optherion.gov/supertracker/.    9354-0464 [employer]. [address] [place] [address]. All rights reserved. This information is not intended as a substitute for professional medical care. Always follow your healthcare professional's instructions.           Patient Education   Signs of Hearing Loss  Hearing loss is a problem shared by many people. In fact, it is one of the most common health conditions, particularly as people age. Most people over age 65 have some hearing loss, and by age 80, almost everyone does. Because hearing loss usually occurs slowly over the years, you may  not realize your hearing ability has gotten worse.       Have your hearing checked  Contact your [facility] care provider if you:    Have to strain to hear normal conversation.    Have to watch other peoples faces very carefully to follow what theyre saying.    Need to ask people to repeat what theyve said.    Often misunderstand what people are saying.    Turn the volume of the television or radio up so high that others complain.    Feel that people are mumbling when theyre talking to you.    Find that the effort to hear leaves you feeling tired and irritated.    Notice, when using the phone, that you hear better with 1 ear than the other.    8805-5693 [employer]. [address] [place] [address], [place] [address]. All rights reserved. This information is not intended as a substitute for professional medical care. Always follow your healthcare professional's instructions.         Patient Education   Urinary Incontinence (Male)    Urinary incontinence means not being able to control the release of urine from the bladder.  Causes  Common causes of urinary incontinence in men include:    Infection    Certain medicines    Aging    Poor pelvic muscle tone    Bladder spasms    Obesity    Urinary retention  Nervous system diseases, diabetes, sleep apnea, urinary tract infections, prostate surgery, and pelvic trauma can also cause incontinence. Constipation and smoking have also been identified as risk factors.  Symptoms    Urge incontinence (also called overactive bladder) is a sudden urge to urinate even though there may not be much urine in the bladder. The need to urinate often during the night is common. It is due to bladder spasms.    Stress incontinence is involuntary urine leakage that can occur with sneezing, coughing, and other actions that put stress on the bladder.  Treatment  Treatment of urinary incontinence depends on the cause. Infections of the bladder are treated with antibiotics. Urinary retention is  treated with a bladder catheter.  Home care  Follow these guidelines when caring for yourself at home:    Don't consume foods and drinks that may irritate the bladder. These include drinks containing alcohol, caffeinate, or carbonation; chocolate; and acidic fruits and juices.    Limit fluid intake to 6 to 8 cups a day.    Lose weight if you are overweight. This will reduce your symptoms.    If needed, wear absorbent pads to catch urine. Change pads frequently to maintain hygiene and prevent skin and bladder infections.    Bathe daily to maintain good hygiene.    If an antibiotic was prescribed to treat a bladder infection, be sure to take it until finished, even if you are feeling better before then. This is to make sure your infection has cleared.    If a catheter was left in place, it is important to keep bacteria from getting into the collection bag. Don't disconnect the catheter from the collection bag.    Use a leg band to secure the catheter drainage tube, so it does not pull on the catheter. Drain the collection bag when it becomes full using the drain spout at the bottom of the bag. Don't disconnect the bag from the catheter.    Don't pull on or try to remove a catheter. The catheter must be removed by a healthcare provider.  Follow-up care  Follow up with your healthcare provider, or as advised.  When to seek medical advice  Call your healthcare provider right away if any of these occur:    Fever over 100.4 F (38 C), or as directed by your healthcare provider    Bladder pain or fullness    Abdominal swelling, nausea, or vomiting    Back pain    Weakness, dizziness, or fainting    If a catheter was left in place, return if:  ? Catheter falls out  ? Catheter stops draining for 6 hours  Date Last Reviewed: 10/1/2017    9530-4226 [employer]. [address] [place] [address], [place] [address]. All rights reserved. This information is not intended as a substitute for professional medical care. Always  follow your healthcare professional's instructions.     Patient Education   Depression and Suicide in Older Adults  Nearly 2 million older Americans have some type of depression. Sadly, some of them even take their own lives. Yet depression among older adults is often ignored. Learn the warning signs. You may help spare a loved one needless pain. You may also save a life.       What Is Depression?  Depression is a mood disorder that affects the way you think and feel. The most common symptom is a feeling of deep sadness. People who are depressed also may seem tired and listless. And nothing seems to give them pleasure. Its normal to grieve or be sad sometimes. But sadness lessens or passes with time. Depression rarely goes away or improves on its own. Other symptoms of depression are:    Sleeping more or less than normal    Eating more or less than normal    Having headaches, stomachaches, or other pains that dont go away    Feeling nervous, empty, or worthless    Crying a great deal    Thinking or talking about suicide or death    Feeling confused or forgetful  What Causes It?  The causes of depression arent fully known. Certain chemicals in the brain play a role. Depression does run in families. And life stresses can also trigger depression in some people. That may be the case with older adults. They often face great burdens, such as the death of friends or a spouse. They may have failing health. And they are more likely to be alone, lonely, or poor.  How You Can Help  Often, depressed people may not want to ask for help. When they do, they may be ignored. Or, they may receive the wrong treatment. You can help by showing parents and older friends love and support. If they seem depressed, help them find the right treatment. Talk to your doctor. Or contact a local mental health center, social service agency, or hospital. With modern treatment, no one has to suffer from depression.  Resources:    National [place]  of Mental Health  875.911.4345  www.nimh.nih.gov    National [place] on Mental Illness  274.302.5820  www.marcin.org    [facility]  556.273.4249  www.[facility].org    National Suicide Hotline  684.968.6660 (800-SUICIDE)      0378-5586 [employer]. [address] [place] [address]. All rights reserved. This information is not intended as a substitute for professional medical care. Always follow your healthcare professional's instructions.           Advance Directive  Patients advance directive was discussed and I am comfortable with the patients wishes.  Patient Education   Personalized Prevention Plan  You are due for the preventive services outlined below.  Your care team is available to assist you in scheduling these services.  If you have already completed any of these items, please share that information with your care team to update in your medical record.  Health Maintenance   Topic Date Due   ? ZOSTER VACCINES (2 of 3) 02/29/2008   ? DIABETES URINE MICROALBUMIN  09/06/2014   ? MEDICARE ANNUAL WELLNESS VISIT  11/01/2018   ? DIABETES OPHTHALMOLOGY EXAM  06/25/2019   ? INFLUENZA VACCINE RULE BASED (1) 08/01/2019   ? DIABETES HEMOGLOBIN A1C  08/13/2019   ? DIABETES FOLLOW-UP  08/13/2019   ? DIABETES FOOT EXAM  02/13/2020   ? FALL RISK ASSESSMENT  02/13/2020   ? ADVANCE CARE PLANNING  11/01/2022   ? TD 18+ HE  09/06/2023   ? PNEUMOCOCCAL IMMUNIZATION 65+ HIGH/HIGHEST RISK  Completed   ? DEPRESSION FOLLOW UP  Discontinued

## 2021-06-18 NOTE — PROGRESS NOTES
Subjective:   Balnco presents because of back pain and leg pain.  Over the last 2-3 weeks he feels like his back pain has worsened.  Pain will radiate down his right leg towards his calf.  Sometimes he will get a tingling sensation in his leg.  The pain is worse with walking and standing.  He does not remember any recent injury.  He is not sleeping well but he does not think it is the pain that is affecting his sleep.  He uses Vicodin for severe pain.  He has a history of lumbar disc disease with lumbar radiculopathy.  He has had back pain for many years.      Objective:  Musculoskeletal: The back has mild palpable tenderness over the lumbar musculature bilaterally.  The right sacroiliac area is mildly tender to palpation as well.  Straight leg raising today is negative bilaterally.  Passive internal and external rotation of the hip does not exacerbate pain.  There is mild crepitus noted with extension and flexion of the knee.  1+ edema noted in both legs.  Deep tendon reflexes symmetric in knee and ankle areas.  Pulses are strong in the right foot.      Assessment:  1.  Low back pain associated with right leg pain consistent with lumbar disc disease with radiculopathy      Plan:  The patient will continue Vicodin for pain control.  He will start icing the back.  He will start a stretching program.  I encouraged him to ambulate as tolerated.  He will limit any repetitive bending or lifting.  We discussed the use of anti-inflammatory medications.  He already is taking Lyrica and Vicodin.  I have decided to start diclofenac sodium 75 mg twice daily with food.  I will see him back within a month to recheck her creatinine.  If creatinine levels do not change and he has benefit from the anti-inflammatory then we can continue that as needed.  He will use Tylenol with every Vicodin dose as well.  Follow-up here sooner if any new problems arise.

## 2021-06-19 ASSESSMENT — SLEEP AND FATIGUE QUESTIONNAIRES
HOW LIKELY ARE YOU TO NOD OFF OR FALL ASLEEP WHILE SITTING INACTIVE IN A PUBLIC PLACE: SLIGHT CHANCE OF DOZING
HOW LIKELY ARE YOU TO NOD OFF OR FALL ASLEEP WHILE LYING DOWN TO REST IN THE AFTERNOON WHEN CIRCUMSTANCES PERMIT: MODERATE CHANCE OF DOZING
HOW LIKELY ARE YOU TO NOD OFF OR FALL ASLEEP WHEN YOU ARE A PASSENGER IN A CAR FOR AN HOUR WITHOUT A BREAK: SLIGHT CHANCE OF DOZING
HOW LIKELY ARE YOU TO NOD OFF OR FALL ASLEEP WHILE WATCHING TV: SLIGHT CHANCE OF DOZING
HOW LIKELY ARE YOU TO NOD OFF OR FALL ASLEEP WHILE SITTING QUIETLY AFTER LUNCH WITHOUT ALCOHOL: MODERATE CHANCE OF DOZING
HOW LIKELY ARE YOU TO NOD OFF OR FALL ASLEEP WHILE SITTING AND READING: SLIGHT CHANCE OF DOZING
HOW LIKELY ARE YOU TO NOD OFF OR FALL ASLEEP IN A CAR, WHILE STOPPED FOR A FEW MINUTES IN TRAFFIC: WOULD NEVER DOZE
HOW LIKELY ARE YOU TO NOD OFF OR FALL ASLEEP WHILE SITTING AND TALKING TO SOMEONE: WOULD NEVER DOZE

## 2021-06-19 NOTE — PROGRESS NOTES
ASSESMENT AND PLAN:  Diagnoses and all orders for this visit:    Primary osteoarthritis involving multiple joints, Lumbar Disc Degeneration  -     Basic Metabolic Panel  Reviewed my partners clinic note from last visit, diclofenac was added, because of this we are checking a metabolic panel to make sure that the kidneys are tolerating the new medication.  He has had minimal improvement with the addition of the medication.  His wife feels like his pain is not well controlled with twice daily hydrocodone and his current regiment and is wondering if the dose can be increased.  Discussed with the patient and his wife that I would not recommend increasing dose of opiates and that they should be used as needed for breakthrough pain and that 60 tablets needs to last at least 1 month and should be used as sparingly as possible.  Detailed counseling on the risks and benefits of his medications and on options for further evaluation and treatment.  If nothing else is working then I think a pain clinic would be the next associated step and the patient was offered this today, at this point he declines, if he is worsening we will reevaluate.  Controlled substance agreement counseling and documentation signed today, see that document for details.    Over 25 minutes of total time face-to-face, more than half in counseling and coronation of care on the above.    SUBJECTIVE: 78-year-old male with ongoing issues with poorly controlled back pain.  He has had some slight improvement since the addition of diclofenac at the last clinic visit with my partner.  He seems to be tolerating the medication well.  Some days he takes it once per day and some days he takes it twice depending on his level of pain.  His pain is in his low back and sometimes radiates down into the buttock and upper leg areas.  He has had chronic pain for many years.  He reports that his pain is under pretty good control.  However, his wife reports that he does not  want to do anything in cannot be very active because he starts to get pain in his low back with mild to moderate activities.  She thinks his pain and needs to be under better control and is wondering about getting him on higher dose of hydrocodone.  No new associated symptoms.  No sudden change in pain pattern.    Past Medical History:   Diagnosis Date     Aortic stenosis      BPH (benign prostatic hyperplasia)      Chronic back pain      Chronic kidney disease      Degenerative joint disease      Dementia      Depression      Diabetes mellitus (H)     type 2     Esophageal reflux      Fatigue      Hyperlipidemia      Hypertension      Left ventricular hypertrophy      Lumbar radiculopathy      Male erectile disorder      Obesity      ZAKIA on CPAP     machine broke at present     Short-term memory loss      Patient Active Problem List   Diagnosis     Male Erectile Disorder     Cerumen Impaction In The Right Ear     Tinea Cruris     Tinea Pedis     Obesity     Left Ventricular Hypertrophy     Constipation     Benign Prostatic Hypertrophy     Allergies     Abnormal Electrocardiogram     Memory Lapses Or Loss     Aortic Stenosis     Type II diabetes mellitus (H)     Chronic Major Depression     Chronic Kidney Disease, Stage 3     Acute Sinusitis     Depression     Intermittent Claudication     Benign Adenomatous Polyp Of The Large Intestine     Lumbar Radiculopathy     Esophageal Reflux     Benign Essential Hypertension     Insomnia     Hyperlipidemia     Localized Osteoarthritis Of The Knee     Lumbar Disc Degeneration     Vitamin D Deficiency     Actinic Keratosis     S/P AVR     Neurocognitive disorder     Coronary artery disease     Lewy body disease     Dementia     Primary osteoarthritis involving multiple joints     Current Outpatient Prescriptions   Medication Sig Dispense Refill     amoxicillin (AMOXIL) 500 MG capsule TAKE 4 CAPSULES BY MOUTH 1 HOUR BEFORE DENTAL APPOINTMENT 4 capsule 1     ascorbic acid  (ASCORBIC ACID WITH LISBET HIPS) 500 MG tablet Take 500 mg by mouth daily.       aspirin 81 MG tablet Take 81 mg by mouth daily.       blood glucose test (ACCU-CHEK DAISHA PLUS TEST STRP) strips Test once daily. 100 strip 11     cetirizine (ZYRTEC) 10 MG tablet Take 10 mg by mouth daily as needed for allergies.        cholecalciferol, vitamin D3, 1,000 unit tablet Take 1,000 Units by mouth daily.       diclofenac (VOLTAREN) 75 MG EC tablet Take 1 tablet (75 mg total) by mouth 2 (two) times a day. 60 tablet 1     donepezil (ARICEPT) 10 MG tablet Take 1 tablet (10 mg total) by mouth daily. 30 tablet 6     fenofibrate (TRIGLIDE) 160 MG tablet Take 1 tablet (160 mg total) by mouth daily. 90 tablet 3     FLUoxetine (PROZAC) 10 MG capsule Take 1 capsule (10 mg total) by mouth daily. 90 capsule 3     fluticasone (FLONASE) 50 mcg/actuation nasal spray 1 spray into each nostril 2 times a day at 6:00 am and 4:00 pm. 16 g 6     FOLIC ACID/MULTIVIT-MIN/LUTEIN (CENTRUM SILVER ORAL) Take 1 tablet by mouth daily.       FOLIC ACID/MV,FE,OTHER MIN (CENTRUM ORAL) Take 1 tablet by mouth daily.       ginseng 100 mg cap Take 1 tablet by mouth daily.       glipiZIDE (GLUCOTROL XL) 2.5 MG 24 hr tablet Take 1 tablet (2.5 mg total) by mouth daily. 90 tablet 3     HYDROcodone-acetaminophen 5-325 mg per tablet Take 1 tablet by mouth every 6 (six) hours as needed for pain. 60 tablet 0     LANCETS MISC Use As Directed.       LYRICA 50 mg capsule TAKE 1 CAPSULE (50 MG) BY MOUTH THREE TIMES DAILY 90 capsule 2     magnesium oxide 500 mg Tab Take 500 mg by mouth daily.       metFORMIN (GLUCOPHAGE) 1000 MG tablet Take 1 tablet (1,000 mg total) by mouth 2 (two) times a day with meals. 180 tablet 1     metoprolol succinate (TOPROL-XL) 25 MG Take 1 tablet (25 mg total) by mouth daily. 90 tablet 3     mupirocin (BACTROBAN) 2 % ointment Apply topically 2 (two) times a day. 22 g 1     OMEGA-3/DHA/EPA/FISH OIL (FISH OIL-OMEGA-3 FATTY ACIDS) 300-1,000 mg  "capsule Take 2 g by mouth daily.       ranitidine (ZANTAC) 150 MG tablet TAKE ONE TABLET BY MOUTH TWICE DAILY 180 tablet 2     SAW PALMETTO ORAL Take 1 capsule by mouth daily.       tamsulosin (FLOMAX) 0.4 mg Cp24 Take 1 capsule (0.4 mg total) by mouth daily. 90 capsule 2     traZODone (DESYREL) 150 MG tablet TAKE 0.5 TABLETS (75 MG TOTAL) BY MOUTH AT BEDTIME. 90 tablet 1     white petrolatum-mineral oil (ARTIFICIAL TEARS, CARISSA/MIN,) 83-15 % Oint Use 1cm ribbon on lower lids nightly and in the am. 3.5 g 12     buPROPion (WELLBUTRIN XL) 150 MG 24 hr tablet Take 1 tablet (150 mg total) by mouth daily. 30 tablet 11     donepezil (ARICEPT) 5 MG tablet TAKE 1 TABLET (5 MG TOTAL) BY MOUTH BEDTIME. 30 tablet 11     promethazine-dextromethorphan (PROMETHAZINE-DM) 6.25-15 mg/5 mL syrup Take 5 mL by mouth every 6 (six) hours as needed for cough. 118 mL 0     No current facility-administered medications for this visit.      History   Smoking Status     Never Smoker   Smokeless Tobacco     Never Used       OBJECTICE: /60 (Patient Site: Right Arm, Patient Position: Sitting, Cuff Size: Adult Large)  Pulse 62  Temp 97.9  F (36.6  C) (Oral)   Resp 24  Ht 5' 11\" (1.803 m)  Wt 217 lb (98.4 kg)  SpO2 94%  BMI 30.27 kg/m2     No results found for this or any previous visit (from the past 24 hour(s)).     GEN-alert, appropriate, in no apparent distress   CV-regular rate and rhythm   RESP-lungs clear to auscultation     Francisco Javier Rojas"

## 2021-06-19 NOTE — LETTER
Letter by Jacqueline Pugh PharmD at      Author: Jacqueline Pugh PharmD Service: -- Author Type: --    Filed:  Encounter Date: 10/21/2019 Status: Signed             10/22/2019      Nestor Peterson  [address]  [facility] [address]            Dear Nestor Peterson     Thank you for talking with me on 10/22/19 about your health and medications. [facility] MTM (Medication Therapy Management) program helps you understand your medications and use them safely.    Along with this letter are an action plan (Medication Action Plan) and a medication list (Personal Medication List). The action plan has steps you should take to help you get the best results from your medications. The medication list will help you keep track of your medications and how to use them the right way.       Have your action plan and medication list with you when you talk with your doctors, pharmacists, and other health care providers.    Ask your doctors, pharmacists, and other healthcare providers to update them at every visit.     Take your medication list with you if you go to the hospital or emergency room.     Give a copy of the action plan and medication list to your family or caregivers.     If you want to talk about this letter or any of the papers with it, please call [phone]. We look forward to working with you, your doctors, and other healthcare providers to help you stay healthy.    Sincerely,     Jacqueline Pugh    _  Medication Action Plan For Nestor Peterson, : 1940     This action plan will help you get the best results from your medications if you:     1. Read What we talked about.   2. Take the steps listed in the What I need to do boxes.   3. Fill in What I did and when I did it.   4. Fill in My follow-up plan and Questions I want to ask.     Have this action plan with you when you talk with your doctors, pharmacists, and other healthcare providers in your care team. Share this with your family or caregivers too.     Date Prepared: 10/22/2019      What we talked about:  The last time we checked your A1c it was Feb 2019 and your urine for protein was 2013.      What I need to do:  At your next appointment with Dr. Rojas, I would recommend an A1c and urine (microalbuminuria) check.    What I did and when I did it:          What we talked about:  It sounds like your stools have been harder lately.      What I need to do:  Start Miralax 17 G once daily. Back off if you start to get diarrhea.    What I did and when I did it:          What we talked about:       What I need to do:       What I did and when I did it:         My follow-up plan (add notes about next steps):            Questions I want to ask (include topics about medications or therapy):          If you have any questions about your action plan, call Jacqueline Pugh at [phone], Monday-Friday 8:00-4:30pm  _  This medication list was made for you after we talked. We also used information from your doctors chart.      Use blank rows to add new medications. Then fill in the dates you started using them.     Cross out medications when you no longer use them. Then write the date and why you stopped using them.     Ask your doctors, pharmacists, and other healthcare providers to update this list at every visit.  Keep this list up-to-date with:  ? prescription medications  ? over the counter drugs  ? herbals  ? vitamins  ? minerals          If you go to the hospital or emergency room, take this list with you. Share this with your family or caregivers too.    Date Prepared: 10/22/2019      Allergies or side effects: codeine      Medication: ACCU-CHEK DAISHA PLUS TEST STRP strips      How I use it: USE TO TEST BLOOD SUGAR ONCE A DAY AS DIRECTED      Why I use it: Type 2 diabetes mellitus     Prescriber: Francisco Javier Rojas MD      Date I started using it:     Date I stopped using it:       Why I stopped using it:          Medication: amoxicillin (AMOXIL) 500 MG capsule       How I use it: TAKE 4 CAPSULES BY MOUTH 1 HOUR BEFORE DENTAL APPOINTMENT      Why I use it: S/P AVR    Prescriber: Anastasiia Hu MD      Date I started using it:     Date I stopped using it:       Why I stopped using it:          Medication: aspirin 81 MG tablet      How I use it: Take 81 mg by mouth daily.      Why I use it: Coronary Artery Disease    Prescriber: Francisco Javier Rojas MD      Date I started using it:     Date I stopped using it:       Why I stopped using it:          Medication: cetirizine (ZYRTEC) 10 MG tablet      How I use it: Take 10 mg by mouth daily as needed for allergies.       Why I use it: Allergies    Prescriber: Francisco Javier Rojas MD      Date I started using it:     Date I stopped using it:       Why I stopped using it:          Medication: cholecalciferol, vitamin D3, 1,000 unit tablet      How I use it: Take 1,000 Units by mouth daily.      Why I use it: Vitamin D Deficiency    Prescriber: Francisco Javier Rojas MD      Date I started using it:     Date I stopped using it:       Why I stopped using it:          Medication: donepezil (ARICEPT) 10 MG tablet      How I use it: TAKE 1 TABLET (10 MG) BY MOUTH DAILY.      Why I use it: Lewy Body Dementia    Prescriber: Francisco Javier Rojas MD      Date I started using it:     Date I stopped using it:       Why I stopped using it:          Medication: donepezil (ARICEPT) 5 MG tablet      How I use it: TAKE 1 TABLET BY MOUTH EVERYDAY AT BEDTIME.       Why I use it: Lewy Body Dementia    Prescriber: Francisco Javier Rojas MD      Date I started using it:     Date I stopped using it:       Why I stopped using it:          Medication: fenofibrate (TRIGLIDE) 160 MG tablet      How I use it: TAKE 1 TABLET (160 MG TOTAL) BY MOUTH DAILY.      Why I use it: Mixed Hyperlipidemia    Prescriber: Anastasiia Hu MD      Date I started using it:     Date I stopped using it:       Why I stopped using it:          Medication: finasteride (PROSCAR) 5 mg tablet      How I  use it: Take 1 tablet (5 mg total) by mouth daily.      Why I use it: Benign prostatic hyperplasia with post-void dribbling    Prescriber: Fracnisco Javier Rojas MD      Date I started using it:     Date I stopped using it:       Why I stopped using it:          Medication: FLUoxetine (PROZAC) 10 MG capsule      How I use it: TAKE 1 CAPSULE BY MOUTH EVERY DAY      Why I use it: Moderate episode of recurrent major depressive disorder     Prescriber: Francisco Javier Rojas MD      Date I started using it:     Date I stopped using it:       Why I stopped using it:          Medication: fluticasone (FLONASE) 50 mcg/actuation nasal spray      How I use it: 1 spray into each nostril 2 times a day at 6:00 am and 4:00 pm.      Why I use it: Rhinitis    Prescriber: Jonn Bustillo PA-C      Date I started using it:     Date I stopped using it:       Why I stopped using it:          Medication: FOLIC ACID/MULTIVITAMIN/LUTEIN (CENTRUM SILVER)      How I use it: Take 1 tablet by mouth daily.      Why I use it: General Health    Prescriber: Francisco Javier Rojas MD      Date I started using it:     Date I stopped using it:       Why I stopped using it:          Medication: ginseng 100 mg cap      How I use it: Take 1 tablet by mouth daily.      Why I use it: General Health    Prescriber: Francisco Javier Rojas MD      Date I started using it:     Date I stopped using it:       Why I stopped using it:          Medication: glipiZIDE (GLUCOTROL XL) 2.5 MG 24 hr tablet      How I use it: TAKE 1 TABLET (2.5 MG TOTAL) BY MOUTH DAILY.      Why I use it: Type 2 Diabetes    Prescriber: Francisco Javier Rojas MD      Date I started using it:     Date I stopped using it:       Why I stopped using it:          Medication: HYDROcodone-acetaminophen 5-325 mg per tablet      How I use it: Take 1 tablet by mouth every 6 (six) hours as needed for pain.      Why I use it: Pain    Prescriber: Francisco Javier Rojas MD      Date I started using it:     Date I stopped using it:       Why I stopped  using it:          Medication: LANCETS MISC      How I use it: Use As Directed.      Why I use it: Type 2 Diabetes     Prescriber: Gene Cheema MD      Date I started using it:     Date I stopped using it:       Why I stopped using it:          Medication: LYRICA 50 mg capsule      How I use it: TAKE 1 CAPSULE (50 MG) BY MOUTH THREE TIMES DAILY      Why I use it: Lumbar spine pain    Prescriber: Francisco Javier Rojas MD      Date I started using it:     Date I stopped using it:       Why I stopped using it:          Medication: metFORMIN (GLUCOPHAGE) 1000 MG tablet      How I use it: Take 1 tablet (1,000 mg total) by mouth 2 (two) times a day with meals.      Why I use it: Type 2 Diabetes    Prescriber: Francisco Javier Rojas MD      Date I started using it:     Date I stopped using it:       Why I stopped using it:          Medication: metoprolol succinate (TOPROL-XL) 25 MG      How I use it: TAKE 1 TABLET (25 MG TOTAL) BY MOUTH DAILY.      Why I use it: Heart Valve Replaced    Prescriber: Anastasiia Hu MD      Date I started using it:     Date I stopped using it:       Why I stopped using it:          Medication: mupirocin (BACTROBAN) 2 % ointment      How I use it: Apply topically 2 (two) times a day.      Why I use it: Skin Infection    Prescriber: Francisco Javier Rojas MD      Date I started using it:     Date I stopped using it:       Why I stopped using it:          Medication: ranitidine (ZANTAC) 150 MG tablet      How I use it: TAKE ONE TABLET BY MOUTH TWICE DAILY      Why I use it: Esophageal Reflux    Prescriber: Francisco Javier Rojas MD      Date I started using it:     Date I stopped using it:       Why I stopped using it:          Medication: SAW PALMETTO ORAL      How I use it: Take 1 capsule by mouth daily.      Why I use it: BPH (benign prostatic hyperplasia)    Prescriber: Francisco Javier Rojas MD      Date I started using it:     Date I stopped using it:       Why I stopped using it:          Medication: tamsulosin  (FLOMAX) 0.4 mg cap      How I use it: Take 1 capsule (0.4 mg total) by mouth daily.      Why I use it: BPH (benign prostatic hyperplasia)    Prescriber: Francisco Javier Rojas MD      Date I started using it:     Date I stopped using it:       Why I stopped using it:          Medication: traZODone (DESYREL) 100 MG tablet      How I use it: Take 1 tablet (100 mg total) by mouth at bedtime.      Why I use it: Insomnia    Prescriber: Francisco Javier Rojas MD      Date I started using it:     Date I stopped using it:       Why I stopped using it:          Medication: white petrolatum-mineral oil (ARTIFICIAL TEARS, CARISSA/MIN,) 83-15 % Oint      How I use it: Use 1cm ribbon on lower lids nightly and in the am.      Why I use it: Dry eyes    Prescriber: Socorro Oropeza MD      Date I started using it:     Date I stopped using it:       Why I stopped using it:          Medication:       How I use it:       Why I use it:     Prescriber:       Date I started using it:     Date I stopped using it:       Why I stopped using it:          Medication:       How I use it:       Why I use it:     Prescriber:       Date I started using it:     Date I stopped using it:       Why I stopped using it:          Medication:       How I use it:       Why I use it:     Prescriber:       Date I started using it:     Date I stopped using it:       Why I stopped using it:          Other Information:      If you have any questions about your medication list, call [phone]    According to the Paperwork Reduction Act of 1995, no persons are required to respond to a collection of information unless it displays a valid OMB control number. The valid B number for this information collection is 8916-6733. The time required to complete this information collection is estimated to average 37.76 minutes per response, including the time to review instructions, searching existing data resources, gather the data needed, and complete and review the information  collection. If you have any comments concerning the accuracy of the time estimate(s) or suggestions for improving this form, please write to: CMS, Attn: PRUDENCE Reports Clearance Officer, [address] [place] [address], [place], [place] [address].

## 2021-06-21 ENCOUNTER — VIRTUAL VISIT (OUTPATIENT)
Dept: SLEEP MEDICINE | Facility: CLINIC | Age: 81
End: 2021-06-21
Payer: COMMERCIAL

## 2021-06-21 VITALS — HEIGHT: 70 IN | WEIGHT: 200 LBS | BODY MASS INDEX: 28.63 KG/M2

## 2021-06-21 DIAGNOSIS — F03.90 DEMENTIA WITHOUT BEHAVIORAL DISTURBANCE, UNSPECIFIED DEMENTIA TYPE: Chronic | ICD-10-CM

## 2021-06-21 DIAGNOSIS — G47.33 OSA (OBSTRUCTIVE SLEEP APNEA): Primary | ICD-10-CM

## 2021-06-21 DIAGNOSIS — F51.04 CHRONIC INSOMNIA: ICD-10-CM

## 2021-06-21 DIAGNOSIS — E11.69 TYPE 2 DIABETES MELLITUS WITH OTHER SPECIFIED COMPLICATION, UNSPECIFIED WHETHER LONG TERM INSULIN USE (H): Chronic | ICD-10-CM

## 2021-06-21 DIAGNOSIS — I10 ESSENTIAL HYPERTENSION, BENIGN: Chronic | ICD-10-CM

## 2021-06-21 DIAGNOSIS — I25.10 CORONARY ARTERY DISEASE INVOLVING NATIVE CORONARY ARTERY OF NATIVE HEART, ANGINA PRESENCE UNSPECIFIED: Chronic | ICD-10-CM

## 2021-06-21 DIAGNOSIS — Z95.2 HISTORY OF AORTIC VALVE REPLACEMENT: Chronic | ICD-10-CM

## 2021-06-21 PROCEDURE — 99205 OFFICE O/P NEW HI 60 MIN: CPT | Mod: GT | Performed by: INTERNAL MEDICINE

## 2021-06-21 SDOH — ECONOMIC STABILITY: INCOME INSECURITY: HOW HARD IS IT FOR YOU TO PAY FOR THE VERY BASICS LIKE FOOD, HOUSING, MEDICAL CARE, AND HEATING?: NOT ASKED

## 2021-06-21 SDOH — ECONOMIC STABILITY: TRANSPORTATION INSECURITY
IN THE PAST 12 MONTHS, HAS THE LACK OF TRANSPORTATION KEPT YOU FROM MEDICAL APPOINTMENTS OR FROM GETTING MEDICATIONS?: NOT ASKED

## 2021-06-21 SDOH — HEALTH STABILITY: MENTAL HEALTH: HOW OFTEN DO YOU HAVE 6 OR MORE DRINKS ON ONE OCCASION?: NOT ASKED

## 2021-06-21 SDOH — ECONOMIC STABILITY: FOOD INSECURITY: WITHIN THE PAST 12 MONTHS, THE FOOD YOU BOUGHT JUST DIDN'T LAST AND YOU DIDN'T HAVE MONEY TO GET MORE.: NOT ASKED

## 2021-06-21 SDOH — HEALTH STABILITY: MENTAL HEALTH: HOW MANY STANDARD DRINKS CONTAINING ALCOHOL DO YOU HAVE ON A TYPICAL DAY?: NOT ASKED

## 2021-06-21 SDOH — ECONOMIC STABILITY: TRANSPORTATION INSECURITY
IN THE PAST 12 MONTHS, HAS LACK OF TRANSPORTATION KEPT YOU FROM MEETINGS, WORK, OR FROM GETTING THINGS NEEDED FOR DAILY LIVING?: NOT ASKED

## 2021-06-21 SDOH — HEALTH STABILITY: MENTAL HEALTH: HOW OFTEN DO YOU HAVE A DRINK CONTAINING ALCOHOL?: NOT ASKED

## 2021-06-21 SDOH — ECONOMIC STABILITY: FOOD INSECURITY: WITHIN THE PAST 12 MONTHS, YOU WORRIED THAT YOUR FOOD WOULD RUN OUT BEFORE YOU GOT MONEY TO BUY MORE.: NOT ASKED

## 2021-06-21 ASSESSMENT — MIFFLIN-ST. JEOR: SCORE: 1623.44

## 2021-06-21 NOTE — PROGRESS NOTES
Does patient have any form of state insurance? y      Blanco is a 81 year old who is being evaluated via a billable video visit.      How would you like to obtain your AVS? MyChart  If the video visit is dropped, the invitation should be resent by: Send to e-mail at: [email or web address]  Will anyone else be joining your video visit? No      Florencio Purcell MD on 6/21/2021 at 10:27 AM    Video Start Time: 11:03 AM     Video-Visit Details    Type of service:  Video Visit    Video End Time:11:47 AM    Originating Location (pt. Location): Home    Distant Location (provider location):  [facility]     Platform used for Video Visit: [employer]         Sleep Consultation:    Date on this visit: 6/21/2021    Nestor Peterson is sent by Anastasiia Hu for a sleep consultation regarding obstructive sleep apnea .    Primary Physician: Francisco Javier Rojas     Chief Complaint   Patient presents with     Consult      Son Bassem Helps with visit. Patient is very tangential    Patients son says pressures are too high    He was diagnosed with obstructive sleep apnea prior to 2010    He does not recall place. He does not recall severity. He was prescribed CPAP. He used CPAP 1-2 years but stopped because it 'made him dizzy'. He had no follow-up.     His son restarted his old CPAP 5/2021 on advice of his cardiologist, but he only wore it 10 minutes because the pressure was too high.       [employer]Med   Auto-PAP 5-15 cmH2O download:  30 total days of use. 0 nonuse days. 28 days with >4 hours use.  Average use 6 hours 6 minutes per day. Median Leak 5 L/min. 95%ile Leak 15 L/min. CPAP 95% pressure 12.9 cm. Median pressure 10.7. AHI 1.4    THIS MUST BE THE WRONG DOWNLOAD BY HISTORY    Blanco lives alone.     Nestor goes to bed at 11 PM- 2 AM during the week. He gets up at 5-7 AM without an alarm. Nestor has difficulty falling asleep most nights. This has been a problem for many years. He thinks about things from the day. He wakes  up 0-1 times a night with difficulty falling back to sleep. He will get up after 30-45 minutes because he starts thinking and will get up and watch TV and fall back to sleep and wake there in the morning most days.  Nestor wakes up to go to the bathroom and uncertain reasons.  On weekends, schedule is similar.   Patient gets an average of 5-6 hours of sleep per night.     Patient does not use electronics in bed and watch TV in bed.     Nestor does snore snoring is loud. Patient does not have a regular bed partner. Patient sleeps on his back and side. He denies no morning headaches or restless legs.     Nestor denies any sleep walking, sleep talking and dream enactment.  He has not fallen out of bed or hurt himself while asleep.     Nestor denies reflux at night.      Patient's [place] Sleepiness score 8/24 inconsistent with severe daytime sleepiness.  He says he is sleep some days, but he will fall asleep in front of TV most days one or 2 times.     Nestor naps on purpose infrequently, he says it 'does not work'.  He denies dozing while driving.  He uses 3 sodas/day. Last caffeine intake is usually before 7 pm.    Co2 27 on 3/8/21      Allergies:    Allergies   Allergen Reactions     Codeine Unknown       Medications:    Current Outpatient Medications   Medication Sig Dispense Refill     ACCU-CHEK DAISHA PLUS TEST STRP strips [ACCU-CHEK DAISHA PLUS TEST STRP STRIPS] USE TO TEST BLOOD SUGAR ONCE A DAY AS DIRECTED 100 strip 3     aspirin 81 MG tablet [ASPIRIN 81 MG TABLET] Take 81 mg by mouth daily.       donepeziL (ARICEPT) 5 MG tablet [DONEPEZIL (ARICEPT) 5 MG TABLET] TAKE 1 TABLET BY MOUTH EVERYDAY AT BEDTIME. 90 tablet 3     finasteride (PROSCAR) 5 mg tablet [FINASTERIDE (PROSCAR) 5 MG TABLET] TAKE 1 TABLET BY MOUTH EVERY DAY 90 tablet 3     FLUoxetine (PROZAC) 10 MG capsule [FLUOXETINE (PROZAC) 10 MG CAPSULE] TAKE 1 CAPSULE BY MOUTH EVERY DAY 90 capsule 3     FOLIC ACID/MULTIVIT-MIN/LUTEIN (CENTRUM SILVER ORAL)  [FOLIC ACID/MULTIVIT-MIN/LUTEIN (CENTRUM SILVER ORAL)] Take 1 tablet by mouth daily.       glipiZIDE (GLUCOTROL XL) 2.5 MG 24 hr tablet [GLIPIZIDE (GLUCOTROL XL) 2.5 MG 24 HR TABLET] TAKE 1 TABLET (2.5 MG TOTAL) BY MOUTH DAILY. 90 tablet 3     Lancets MISC [LANCETS MISC] Use As Directed.       metFORMIN (GLUCOPHAGE) 1000 MG tablet [METFORMIN (GLUCOPHAGE) 1000 MG TABLET] TAKE 1 TABLET BY MOUTH TWICE A DAY WITH MEALS 180 tablet 3     metoprolol succinate (TOPROL-XL) 25 MG [METOPROLOL SUCCINATE (TOPROL-XL) 25 MG] TAKE 1 TABLET BY MOUTH EVERY DAY 90 tablet 1     pregabalin (LYRICA) 50 MG capsule [PREGABALIN (LYRICA) 50 MG CAPSULE] Take 1 capsule (50 mg total) by mouth at bedtime. 90 capsule 3     tamsulosin (FLOMAX) 0.4 mg cap [TAMSULOSIN (FLOMAX) 0.4 MG CAP] TAKE 1 CAPSULE BY MOUTH EVERY DAY 90 capsule 2     traZODone (DESYREL) 100 MG tablet [TRAZODONE (DESYREL) 100 MG TABLET] TAKE 1 TABLET BY MOUTH EVERYDAY AT BEDTIME 90 tablet 3     white petrolatum-mineral oil (ARTIFICIAL TEARS, CARISSA/MIN,) 83-15 % Oint [WHITE PETROLATUM-MINERAL OIL (ARTIFICIAL TEARS, CARISSA/MIN,) 83-15 % OINT] Use 1cm ribbon on lower lids nightly and in the am. 3.5 g 12       Problem List:  Patient Active Problem List    Diagnosis Date Noted     Dementia (H) 06/19/2017     Priority: High     History of aortic valve replacement      Priority: High     Bicuspid mild-mod AS on echo 4/08  Status post LIMA to LAD bypass and bioprosthetic aortic valve replacement in 07/2014        Lewy body disease (H) 01/18/2016     Priority: High     Coronary artery disease 05/14/2015     Priority: High     Status post LIMA to LAD bypass and bioprosthetic aortic valve replacement in 07/2014        ZAKIA (obstructive sleep apnea) 06/15/2021     Priority: Medium     Hyperlipidemia      Priority: Medium     Type 2 diabetes mellitus (H)      Priority: Medium     Chronic Kidney Disease, Stage 3      Priority: Medium     Depression      Priority: Medium     Benign Essential  Hypertension      Priority: Medium     Sensorineural hearing loss (SNHL) of both ears 06/15/2021     Priority: Low     Seasonal allergic rhinitis due to pollen      Priority: Low     Benign prostatic hyperplasia with post-void dribbling      Priority: Low     Primary osteoarthritis involving multiple joints 07/16/2018     Priority: Low     Insomnia      Priority: Low     Constipation      Priority: Low     Localized Osteoarthritis Of The Knee      Priority: Low     Vitamin D deficiency      Priority: Low     Lumbar Disc Degeneration      Priority: Low     Actinic keratosis      Priority: Low     Created by Conversion         Male Erectile Disorder      Priority: Low     Left Ventricular Hypertrophy      Priority: Low     4/08 echo for murmur         Abnormal Electrocardiogram      Priority: Low     Intermittent Claudication      Priority: Low     Benign Adenomatous Polyp Of The Large Intestine      Priority: Low     Lumbar Radiculopathy      Priority: Low        Past Medical/Surgical History:  Past Medical History:   Diagnosis Date     Neurocognitive disorder 7/20/2014     Past Surgical History:   Procedure Laterality Date     CARDIAC SURGERY  07/2014    status post LIMA to LAD bypass and bioprosthetic aortic valve replacement in 07/2014     IR LUMBAR EPIDURAL STEROID INJECTION  10/31/2005     IR LUMBAR EPIDURAL STEROID INJECTION  11/21/2005     IR LUMBAR EPIDURAL STEROID INJECTION  08/29/2007     IR LUMBAR EPIDURAL STEROID INJECTION  09/13/2007       Social History:  Social History     Socioeconomic History     Marital status:      Spouse name: Not on file     Number of children: Not on file     Years of education: Not on file     Highest education level: Not on file   Occupational History     Occupation: - retired   Social Needs     Financial resource strain: Not on file     Food insecurity     Worry: Not on file     Inability: Not on file     Transportation needs     Medical: Not on file      "Non-medical: Not on file   Tobacco Use     Smoking status: Never Smoker     Smokeless tobacco: Never Used   Substance and Sexual Activity     Alcohol use: Not Currently     Drug use: Not on file     Sexual activity: Not on file   Lifestyle     Physical activity     Days per week: Not on file     Minutes per session: Not on file     Stress: Not on file   Relationships     Social connections     Talks on phone: Not on file     Gets together: Not on file     Attends Gnosticist service: Not on file     Active member of club or organization: Not on file     Attends meetings of clubs or organizations: Not on file     Relationship status: Not on file     Intimate partner violence     Fear of current or ex partner: Not on file     Emotionally abused: Not on file     Physically abused: Not on file     Forced sexual activity: Not on file   Other Topics Concern     Not on file   Social History Narrative     Not on file       Family History:  No family history on file.    Review of Systems:  A complete review of systems reviewed by me is negative with the exeption of what has been mentioned in the history of present illness.  CONSTITUTIONAL:  POSITIVE for  Moist chest area on pajamas at time in morning  EYES:  NEGATIVE for double vision  ENT:  NEGATIVE for  sore throat  CARDIAC:  NEGATIVE for  fluttering in chest at night  NEUROLOGIC:  NEGATIVE for weakness or numbness in the arms or legs  DERMATOLOGIC:  NEGATIVE for signifivant rashes  PULMONARY:  NEGATIVE for  SOB with activity  GASTROINTESTINAL:  NEGATIVE for  loose or watery stools and constipation  GENITOURINARY:  POSITIVE for  urinating more frequently than usual  MUSCULOSKELETAL:  POSITIVE for  bone or joint pain      Physical Examination:  Vitals: Ht 1.778 m (5' 10\")   Wt 90.7 kg (200 lb)   BMI 28.70 kg/m    BMI= Body mass index is 28.7 kg/m .      [place] Total Score 6/19/2021   Total score - [place] 8       [place]IL[place] Total Score: 10 (06/19/21 1205)    SpO2 Readings from " Last 4 Encounters:   03/26/21 97%   03/08/21 95%   10/13/20 94%   11/04/19 93%     GENERAL APPEARANCE: alert and no distress  EYES: Eyes grossly normal to inspection  HENT: mouth without ulcers or lesions  NECK: not generous size  LUNGS: no shortness of breath, cough  NEURO: mentation intact, speech normal and cranial nerves 2-12 appear intact  PSYCH: affect normal/bright        Impression/Plan:    Reported history of obstructive sleep apnea, unknown severity, diagnosed by sleep study >10 years ago. No study available for review. Currently untreated with symptoms of sleep maintenance difficulties.   Comorbid coronary artery disease, diabetes mellitus, hypertension, dementia, depression.   - Current download is not correct (not sure how that happened)  - Elect Polysomnogram (using 4% desaturation/Medicare/ AASM 1B scoring rules) for re-evaluation of obstructive sleep apnea.       Sleep onset, maintenance difficulties   Discussed caffeine use, regular wake times, avoiding sleep in front of TV, getting up if not sleeping and not watching TV at night when having trouble falling back to sleep    He will follow up with me in approximately two weeks after his sleep study has been competed to review the results and discuss plan of care.     Obstructive sleep apnea reviewed.  Complications of untreated sleep apnea were reviewed.    I spent 40 minutes with patient including counseling, and 20 minutes with chart review, and documentation     CC: Anastasiia Hu

## 2021-06-21 NOTE — PROGRESS NOTES
ASSESMENT AND PLAN:  Diagnoses and all orders for this visit:    Benign prostatic hyperplasia with post-void dribbling  Counseled the patient and his wife on treatment options.  We decided to do evaluation as detailed below along with the initiation of finasteride.  We reviewed the risks and benefits of the medication.  Patient was counseled to try to get himself used to using an absorbent pad.  -     finasteride (PROSCAR) 5 mg tablet; Take 1 tablet (5 mg total) by mouth daily.  Dispense: 90 tablet; Refill: 3  -     Urinalysis-UC if Indicated  -     Culture, Urine    Dementia  Slow progressive worsening.  Counseling done with the patient and his wife on resources and support, currently they feel most comfortable with continued to be in the home but are starting to think about long-term options such as memory care.        SUBJECTIVE: 78-year-old male with progressive dementia who has noticed slow worsening of his memory over the past 6 months, his wife has noticed that he has become increasingly stubborn and difficult to deal with, especially around wearing absorbent pads or adult diapers.  He has frequent urination and urinary dribbling and is getting urine on the clothing but almost always refuses to wear any under pad or adult diaper.  No dysuria or gross hematuria.  No fevers or vomiting.  Patient has a history of chronic back pain which has been under fair control with the medication regiment he currently uses.  No recent changes in his pain pattern or new associated symptoms.    Past Medical History:   Diagnosis Date     Aortic stenosis      BPH (benign prostatic hyperplasia)      Chronic back pain      Chronic kidney disease      Degenerative joint disease      Dementia      Depression      Diabetes mellitus (H)     type 2     Esophageal reflux      Fatigue      Hyperlipidemia      Hypertension      Left ventricular hypertrophy      Lumbar radiculopathy      Male erectile disorder      Obesity      ZAKIA on CPAP      machine broke at present     Short-term memory loss      Patient Active Problem List   Diagnosis     Male Erectile Disorder     Cerumen Impaction In The Right Ear     Tinea Cruris     Tinea Pedis     Obesity     Left Ventricular Hypertrophy     Constipation     Benign prostatic hyperplasia with post-void dribbling     Allergies     Abnormal Electrocardiogram     Memory Lapses Or Loss     Aortic Stenosis     Type II diabetes mellitus (H)     Chronic Major Depression     Chronic Kidney Disease, Stage 3     Acute Sinusitis     Depression     Intermittent Claudication     Benign Adenomatous Polyp Of The Large Intestine     Lumbar Radiculopathy     Esophageal Reflux     Benign Essential Hypertension     Insomnia     Hyperlipidemia     Localized Osteoarthritis Of The Knee     Lumbar Disc Degeneration     Vitamin D Deficiency     Actinic Keratosis     S/P AVR     Neurocognitive disorder     Coronary artery disease     Lewy body disease     Dementia     Primary osteoarthritis involving multiple joints     Current Outpatient Prescriptions   Medication Sig Dispense Refill     ACCU-CHEK DAISHA PLUS TEST STRP strips USE TO TEST BLOOD SUGAR ONCE A DAY AS DIRECTED 100 strip 3     amoxicillin (AMOXIL) 500 MG capsule TAKE 4 CAPSULES BY MOUTH 1 HOUR BEFORE DENTAL APPOINTMENT 4 capsule 1     ascorbic acid (ASCORBIC ACID WITH LISBET HIPS) 500 MG tablet Take 500 mg by mouth daily.       aspirin 81 MG tablet Take 81 mg by mouth daily.       cetirizine (ZYRTEC) 10 MG tablet Take 10 mg by mouth daily as needed for allergies.        cholecalciferol, vitamin D3, 1,000 unit tablet Take 1,000 Units by mouth daily.       diclofenac (VOLTAREN) 75 MG EC tablet TAKE 1 TABLET BY MOUTH TWICE A DAY 60 tablet 1     donepezil (ARICEPT) 10 MG tablet Take 1 tablet (10 mg total) by mouth daily. 30 tablet 6     donepezil (ARICEPT) 5 MG tablet TAKE 1 TABLET (5 MG TOTAL) BY MOUTH BEDTIME. 30 tablet 11     fenofibrate (TRIGLIDE) 160 MG tablet Take 1 tablet  (160 mg total) by mouth daily. 90 tablet 3     FLUoxetine (PROZAC) 10 MG capsule Take 1 capsule (10 mg total) by mouth daily. 90 capsule 3     fluticasone (FLONASE) 50 mcg/actuation nasal spray 1 spray into each nostril 2 times a day at 6:00 am and 4:00 pm. 16 g 6     FOLIC ACID/MULTIVIT-MIN/LUTEIN (CENTRUM SILVER ORAL) Take 1 tablet by mouth daily.       FOLIC ACID/MV,FE,OTHER MIN (CENTRUM ORAL) Take 1 tablet by mouth daily.       ginseng 100 mg cap Take 1 tablet by mouth daily.       glipiZIDE (GLUCOTROL XL) 2.5 MG 24 hr tablet Take 1 tablet (2.5 mg total) by mouth daily. 90 tablet 3     HYDROcodone-acetaminophen 5-325 mg per tablet Take 1 tablet by mouth every 6 (six) hours as needed for pain. 60 tablet 0     LANCETS MISC Use As Directed.       LYRICA 50 mg capsule TAKE 1 CAPSULE (50 MG) BY MOUTH THREE TIMES DAILY 90 capsule 2     LYRICA 50 mg capsule TAKE 1 CAPSULE (50 MG) BY MOUTH THREE TIMES DAILY 90 capsule 2     magnesium oxide 500 mg Tab Take 500 mg by mouth daily.       metFORMIN (GLUCOPHAGE) 1000 MG tablet TAKE 1 TABLET (1,000 MG TOTAL) BY MOUTH 2 (TWO) TIMES A DAY WITH MEALS. 180 tablet 2     metoprolol succinate (TOPROL-XL) 25 MG TAKE 1 TABLET (25 MG TOTAL) BY MOUTH DAILY. 90 tablet 3     mupirocin (BACTROBAN) 2 % ointment Apply topically 2 (two) times a day. 22 g 1     OMEGA-3/DHA/EPA/FISH OIL (FISH OIL-OMEGA-3 FATTY ACIDS) 300-1,000 mg capsule Take 2 g by mouth daily.       promethazine-dextromethorphan (PROMETHAZINE-DM) 6.25-15 mg/5 mL syrup Take 5 mL by mouth every 6 (six) hours as needed for cough. 118 mL 0     ranitidine (ZANTAC) 150 MG tablet TAKE ONE TABLET BY MOUTH TWICE DAILY 180 tablet 2     SAW PALMETTO ORAL Take 1 capsule by mouth daily.       tamsulosin (FLOMAX) 0.4 mg Cp24 Take 1 capsule (0.4 mg total) by mouth daily. 90 capsule 2     traZODone (DESYREL) 150 MG tablet TAKE 0.5 TABLETS (75 MG TOTAL) BY MOUTH AT BEDTIME. 90 tablet 1     white petrolatum-mineral oil (ARTIFICIAL TEARS,  "CARISSA/MIN,) 83-15 % Oint Use 1cm ribbon on lower lids nightly and in the am. 3.5 g 12     buPROPion (WELLBUTRIN XL) 150 MG 24 hr tablet Take 1 tablet (150 mg total) by mouth daily. 30 tablet 11     finasteride (PROSCAR) 5 mg tablet Take 1 tablet (5 mg total) by mouth daily. 90 tablet 3     No current facility-administered medications for this visit.      History   Smoking Status     Never Smoker   Smokeless Tobacco     Never Used       OBJECTICE: /60 (Patient Site: Right Arm, Patient Position: Sitting, Cuff Size: Adult Large)  Pulse 60  Temp 98.5  F (36.9  C) (Oral)   Resp 16  Ht 5' 11\" (1.803 m)  Wt 217 lb (98.4 kg)  SpO2 94%  BMI 30.27 kg/m2     No results found for this or any previous visit (from the past 24 hour(s)).     GEN-alert, appropriate, in no apparent distress   CV-regular rate and rhythm   RESP-lungs clear to auscultation   ABDOMINAL-soft and nontender   EXTREM-warm with no edema   Psychiatric-appearance is well-groomed, speech of normal fluency and rate, mood is mildly depressed, affect is normal, thought content negative for suicidal or homicidal ideation, thought  processing negative for paranoid or delusional thinking.      Francisco Javier Rojas"

## 2021-06-21 NOTE — PATIENT INSTRUCTIONS
Your BMI is Body mass index is 28.7 kg/m .  Weight management is a personal decision.  If you are interested in exploring weight loss strategies, the following discussion covers the approaches that may be successful. Body mass index (BMI) is one way to tell whether you are at a healthy weight, overweight, or obese. It measures your weight in relation to your height.  A BMI of 18.5 to 24.9 is in the healthy range. A person with a BMI of 25 to 29.9 is considered overweight, and someone with a BMI of 30 or greater is considered obese. More than two-thirds of American adults are considered overweight or obese.  Being overweight or obese increases the risk for further weight gain. Excess weight may lead to heart disease and diabetes.  Creating and following plans for healthy eating and physical activity may help you improve your health.  Weight control is part of healthy lifestyle and includes exercise, emotional health, and healthy eating habits. Careful eating habits lifelong are the mainstay of weight control. Though there are significant health benefits from weight loss, long-term weight loss with diet alone may be very difficult to achieve- studies show long-term success with dietary management in less than 10% of people. Attaining a healthy weight may be especially difficult to achieve in those with severe obesity. In some cases, medications, devices and surgical management might be considered.  What can you do?  If you are overweight or obese and are interested in methods for weight loss, you should discuss this with your provider.     Consider reducing daily calorie intake by 500 calories.     Keep a food journal.     Avoiding skipping meals, consider cutting portions instead.    Diet combined with exercise helps maintain muscle while optimizing fat loss. Strength training is particularly important for building and maintaining muscle mass. Exercise helps reduce stress, increase energy, and improves fitness.  Increasing exercise without diet control, however, may not burn enough calories to loose weight.       Start walking three days a week 10-20 minutes at a time    Work towards walking thirty minutes five days a week     Eventually, increase the speed of your walking for 1-2 minutes at time    In addition, we recommend that you review healthy lifestyles and methods for weight loss available through the National Institutes of Health patient information sites:  http://win.niddk.nih.gov/publications/index.htm    And look into health and wellness programs that may be available through your health insurance provider, employer, local community center, or galen club.

## 2021-06-21 NOTE — LETTER
Letter by Francisco Javier Rojas MD at      Author: Francisco Javier Rojas MD Service: -- Author Type: --    Filed:  Encounter Date: 10/14/2020 Status: (Other)               [address]  [place] [address]   PHONE [phone]  [phone]     October 14, 2020  Nestor Peterson  [address] [address]    Dear Nestor:    Below are the results from your recent visit.  Your results show good news.  The diabetes is under good control.  The cholesterol is under good control.  The PSA is well within the normal range-no sign of prostate cancer.    Resulted Orders   Glycosylated Hemoglobin A1c   Result Value Ref Range    Hemoglobin A1c 6.7 (H) <=5.6 %      Comment:      Normal <5.7% Prediabete 5.7-6.4% Diabletes 6.5% or higher - adopted from ADA consensus guidelines   PSA, Annual Screen (Prostatic-Specific Antigen)   Result Value Ref Range    PSA 2.0 0.0 - 6.5 ng/mL    Narrative    Method is Abbott Prostate-Specific Antigen (PSA)  Standard-WHO 1st International (90:10)   LDL Cholesterol, Direct   Result Value Ref Range    Direct  <=129 mg/dl         If you have any questions or concerns, please do not hesitate to call.    Sincerely,      Francisco Javier Rojas MD  October 14, 2020

## 2021-06-21 NOTE — LETTER
Letter by Sarah Ruiz RN at      Author: Sarah Ruiz RN Service: -- Author Type: --    Filed:  Encounter Date: 1/22/2021 Status: (Other)       [facility]  [facility]  [facility]  [address]   [place] [address]      Nestor Peterson  [address] [address]        Dear Nestor    We have reviewed the Health Care Directive dated 5-13-12 which you presented for addition to   your medical record. Unfortunately, our review indicates the document is not legally valid for   the following reason(s): The document is missing page(s) attachments as indicated on page 5 .  In order to create a legal document you will need to send us a copy of the attachments for this document. If you did not intend to state you had attachments, please notify us by mail or the email address below.   We greatly value the opportunity to assist you in documenting your choices and to honor your   wishes. We apologize for any inconvenience. We have several options to assist you in updating   your document so it meets legal requirements.           COPIES of completed Health Care Directives can be brought or mailed to any of our   locations, including the address listed below. You can also email a copy to [email or web address] .       For additional assistance or questions you can email us at [email or web address] or call [phone]      Sincerely,     [facility]  [facility], [facility], and [facility]  Email us: [email or web address] Call us: [phone]  Visit at: [email or web address]

## 2021-06-23 NOTE — TELEPHONE ENCOUNTER
Refill Approved    Rx renewed per Medication Renewal Policy. Medication was last renewed on 11/12/18.    Judi Raymundo, [facility] Triage/Med Refill 1/21/2019     Requested Prescriptions   Pending Prescriptions Disp Refills     donepezil (ARICEPT) 5 MG tablet 30 tablet 1     Sig: Take 1 tablet (5 mg total) by mouth at bedtime.    Cholinesterase Inhibitor/Anti-Alzheimer Agent Refill Protocol Passed - 1/20/2019 10:51 AM       Passed - Visit with PCP or prescribing provider visit in last 6 months or next 3 months    Last office visit with prescriber/PCP: 10/16/2018 OR same dept: 10/16/2018 Francisco Javier Rojas MD OR same specialty: 10/16/2018 Francisco Javier Rojas MD Last physical: Visit date not found Last MTM visit: Visit date not found     Next appt within 3 mo: Visit date not found  Next physical within 3 mo: Visit date not found  Prescriber OR PCP: Francisco Javier Rojas MD  Last diagnosis associated with med order: 1. Lewy body dementia  - donepezil (ARICEPT) 5 MG tablet; Take 1 tablet (5 mg total) by mouth at bedtime.  Dispense: 30 tablet; Refill: 1    If protocol passes may refill for 6 months if within 3 months of last provider visit (or a total of 9 months).

## 2021-06-23 NOTE — TELEPHONE ENCOUNTER
Controlled Substance Refill Request  Medication:   Requested Prescriptions     Pending Prescriptions Disp Refills     LYRICA 50 mg capsule [Pharmacy Med Name: LYRICA 50 MG CAPSULE] 90 capsule 2     Sig: TAKE 1 CAPSULE (50 MG) BY MOUTH THREE TIMES DAILY     Date Last Fill: 10/3/18   #90 R-2  Pharmacy: [facility] [id number]   Submit electronically to pharmacy  Controlled Substance Agreement on File:   Encounter-Level CSA Scan Date:    There are no encounter-level csa scan date.       Last office visit: 10/16/2018 Francisco Javier Rojas MD Katie Beck RN Triage [facility]

## 2021-06-23 NOTE — TELEPHONE ENCOUNTER
Controlled Substance Refill Request  Medication Name:   Requested Prescriptions     Pending Prescriptions Disp Refills     HYDROcodone-acetaminophen 5-325 mg per tablet 60 tablet 0     Sig: Take 1 tablet by mouth every 6 (six) hours as needed for pain.     Date Last Fill: 12/10/2018  Pharmacy: [facility]      Submit electronically to pharmacy  Controlled Substance Agreement Date Scanned:   Encounter-Level CSA Scan Date:    There are no encounter-level csa scan date.       Last office visit with prescriber/PCP: 10/16/2018 Francisco Javier Rojas MD OR same dept: 10/16/2018 Francisco Javier Rojas MD OR same specialty: 10/16/2018 Francisco Javier Rojas MD  Last physical: 11/1/2017 Last MTM visit: Visit date not found

## 2021-06-24 NOTE — PROGRESS NOTES
ASSESMENT AND PLAN:  Diagnoses and all orders for this visit:    Type 2 diabetes mellitus with stage 3 chronic kidney disease, with long-term current use of insulin (H)  -     Glycosylated Hemoglobin A1c done today shows continued excellent control.  Continue current treatment plan and recheck again in 4-6 months.  -     Comprehensive Metabolic Panel    Chronic Kidney Disease, Stage 3  -     Comprehensive Metabolic Panel    Diarrhea, unspecified type  We are going to try to remove any potential mood bowel stimulants from his regimen.  We are discontinuing multiple vitamins and magnesium and reducing caffeine.  Also discussed dietary food adjustments.  If he has not had improvement in his diarrhea over the next 3 weeks he will let me know and we will consider GI workup.              SUBJECTIVE: 78-year-old male here for follow-up on his type 2 diabetes.  He has been under excellent control.  His last A1c was less than 7.  He brings in his glucometer, and most readings are in range.  No hypoglycemia.  His lowest reading is 84 and his highest reading is 160.  Patient has a history of chronic kidney disease and is past due for lab follow-up.  His new concern is over the last 2-3 weeks he has been having some mild diarrhea.  He reports that his stools have been loose.  Nonbloody.  About 4 bowel movements per day.  He is also had some urgency of stool.  With the diarrhea he is also had some sensation of bloating and a mild crampy lower abdominal discomfort diffusely that comes and goes.  Patient drinks about 3 caffeinated beverages per day.  He also takes a variety of vitamins and supplements including the magnesium.  He has noticed that the loose stools and urgency seem worse after eating certain foods such as some fruits and beans.  She lives with his wife.  He does have dementia which has been slowly progressive.  He has also had slowly worsening urinary, please see previous notes for details.    Past Medical History:    Diagnosis Date     Aortic stenosis      BPH (benign prostatic hyperplasia)      Chronic back pain      Chronic kidney disease      Degenerative joint disease      Dementia      Depression      Diabetes mellitus (H)     type 2     Esophageal reflux      Fatigue      Hyperlipidemia      Hypertension      Left ventricular hypertrophy      Lumbar radiculopathy      Male erectile disorder      Obesity      ZAKIA on CPAP     machine broke at present     Short-term memory loss      Patient Active Problem List   Diagnosis     Male Erectile Disorder     Cerumen Impaction In The Right Ear     Tinea Cruris     Tinea Pedis     Obesity     Left Ventricular Hypertrophy     Constipation     Benign prostatic hyperplasia with post-void dribbling     Allergies     Abnormal Electrocardiogram     Memory Lapses Or Loss     Aortic Stenosis     Type II diabetes mellitus (H)     Chronic Major Depression     Chronic Kidney Disease, Stage 3     Acute Sinusitis     Depression     Intermittent Claudication     Benign Adenomatous Polyp Of The Large Intestine     Lumbar Radiculopathy     Esophageal Reflux     Benign Essential Hypertension     Insomnia     Hyperlipidemia     Localized Osteoarthritis Of The Knee     Lumbar Disc Degeneration     Vitamin D Deficiency     Actinic Keratosis     S/P AVR     Neurocognitive disorder     Coronary artery disease     Lewy body disease     Dementia     Primary osteoarthritis involving multiple joints     Current Outpatient Medications   Medication Sig Dispense Refill     ACCU-CHEK DAISHA PLUS TEST STRP strips USE TO TEST BLOOD SUGAR ONCE A DAY AS DIRECTED 100 strip 3     amoxicillin (AMOXIL) 500 MG capsule TAKE 4 CAPSULES BY MOUTH 1 HOUR BEFORE DENTAL APPOINTMENT 4 capsule 1     aspirin 81 MG tablet Take 81 mg by mouth daily.       cetirizine (ZYRTEC) 10 MG tablet Take 10 mg by mouth daily as needed for allergies.        cholecalciferol, vitamin D3, 1,000 unit tablet Take 1,000 Units by mouth daily.        diclofenac (VOLTAREN) 75 MG EC tablet TAKE 1 TABLET BY MOUTH TWICE A DAY 60 tablet 1     donepezil (ARICEPT) 10 MG tablet TAKE 1 TABLET (10 MG) BY MOUTH DAILY. 90 tablet 3     donepezil (ARICEPT) 5 MG tablet Take 1 tablet (5 mg total) by mouth at bedtime. 30 tablet 1     fenofibrate (TRIGLIDE) 160 MG tablet TAKE 1 TABLET (160 MG TOTAL) BY MOUTH DAILY. 90 tablet 2     finasteride (PROSCAR) 5 mg tablet Take 1 tablet (5 mg total) by mouth daily. 90 tablet 3     FLUoxetine (PROZAC) 10 MG capsule Take 1 capsule (10 mg total) by mouth daily. 90 capsule 3     fluticasone (FLONASE) 50 mcg/actuation nasal spray 1 spray into each nostril 2 times a day at 6:00 am and 4:00 pm. 16 g 6     FOLIC ACID/MULTIVIT-MIN/LUTEIN (CENTRUM SILVER ORAL) Take 1 tablet by mouth daily.       ginseng 100 mg cap Take 1 tablet by mouth daily.       glipiZIDE (GLUCOTROL XL) 2.5 MG 24 hr tablet TAKE 1 TABLET (2.5 MG TOTAL) BY MOUTH DAILY. 90 tablet 3     HYDROcodone-acetaminophen 5-325 mg per tablet Take 1 tablet by mouth every 6 (six) hours as needed for pain. 60 tablet 0     LANCETS MISC Use As Directed.       LYRICA 50 mg capsule TAKE 1 CAPSULE (50 MG) BY MOUTH THREE TIMES DAILY 90 capsule 2     LYRICA 50 mg capsule TAKE 1 CAPSULE (50 MG) BY MOUTH THREE TIMES DAILY 90 capsule 2     metFORMIN (GLUCOPHAGE) 1000 MG tablet TAKE 1 TABLET (1,000 MG TOTAL) BY MOUTH 2 (TWO) TIMES A DAY WITH MEALS. 180 tablet 2     metoprolol succinate (TOPROL-XL) 25 MG TAKE 1 TABLET (25 MG TOTAL) BY MOUTH DAILY. 90 tablet 3     mupirocin (BACTROBAN) 2 % ointment Apply topically 2 (two) times a day. 22 g 1     OMEGA-3/DHA/EPA/FISH OIL (FISH OIL-OMEGA-3 FATTY ACIDS) 300-1,000 mg capsule Take 2 g by mouth daily.       ranitidine (ZANTAC) 150 MG tablet TAKE ONE TABLET BY MOUTH TWICE DAILY 180 tablet 2     SAW PALMETTO ORAL Take 1 capsule by mouth daily.       tamsulosin (FLOMAX) 0.4 mg Cp24 Take 1 capsule (0.4 mg total) by mouth daily. 90 capsule 2     traZODone (DESYREL)  "150 MG tablet TAKE 0.5 TABLETS (75 MG TOTAL) BY MOUTH AT BEDTIME. 90 tablet 1     white petrolatum-mineral oil (ARTIFICIAL TEARS, CARISSA/MIN,) 83-15 % Oint Use 1cm ribbon on lower lids nightly and in the am. 3.5 g 12     buPROPion (WELLBUTRIN XL) 150 MG 24 hr tablet Take 1 tablet (150 mg total) by mouth daily. 30 tablet 11     No current facility-administered medications for this visit.      Social History     Tobacco Use   Smoking Status Never Smoker   Smokeless Tobacco Never Used       OBJECTICE: /70 (Patient Site: Right Arm, Patient Position: Sitting, Cuff Size: Adult Large)   Pulse 64   Temp 98  F (36.7  C) (Oral)   Resp 16   Ht 5' 11\" (1.803 m)   Wt 218 lb (98.9 kg)   SpO2 95%   BMI 30.40 kg/m       Recent Results (from the past 24 hour(s))   Glycosylated Hemoglobin A1c    Collection Time: 02/13/19  4:09 PM   Result Value Ref Range    Hemoglobin A1c 6.6 (H) 3.5 - 6.0 %        GEN-alert, appropriate, in no apparent distress   Foot exam-normal.  Palpable pedal pulses bilaterally, no ulcers.   CV-regular rate and rhythm with no murmur   RESP-lungs clear to auscultation   ABDOMINAL-soft, nontender, no palpable masses organomegaly   EXTREM-warm with no ankle edema   Psychiatric- appearance is well-groomed, speech of normal fluency and rate, mood is not depressed, affect is bright.      Francisco Javier Rojas          "

## 2021-06-25 NOTE — PROGRESS NOTES
Progress Notes by Anastasiia Hu MD at 9/27/2017 10:10 AM     Author: Anastasiia Hu MD Service: -- Author Type: Physician    Filed: 9/27/2017 11:25 AM Encounter Date: 9/27/2017 Status: Signed    : Anastasiia Hu MD (Physician)           Click to link to [facility] HEART CARE NOTE    Thank you, Dr. Rojas, for asking us to see Nestor Peterson at the [facility].      Assessment/Recommendations   Assessment/plan:    1.  Coronary artery disease: Status post coronary artery bypass surgery with LIMA to LAD.  Circumflex was too small for bypass.  No recurrent anginal pain.  Continue on daily aspirin and fenofibrate.    He needs repeat fasting lipids which will be done by his primary on his annual physical.  2.  Aortic stenosis: Status post bioprosthetic aortic valve replacement.  He needs prophylaxis prior to dental procedures.  3.  Hypertension: Well-controlled.  4.  Follow-up with me in one year.       History of Present Illness    Mr. Nestor Peterson is a 77 y.o. male with history of coronary artery disease status post LIMA to LAD bypass with residual circumflex disease (target too small for bypass) and bioprosthetic aortic valve replacement in 07/2014, dementia, ZAKIA does not tolerate CPAP and depression here for a follow up.    He has been suffering from progressive dementia.  He recently had his medications adjusted and was started on Prozac.  His statin was stopped by his primary for unknown reasons.  His wife reports that he has been doing better with these changes.  He has not had any problems with breathing, chest pain, palpitations or lightheadedness.      Physical Examination Review of Systems   Vitals:    09/27/17 1015   BP: 102/56   Pulse: (!) 56   Resp: 18   SpO2: 95%     Body mass index is 28.19 kg/(m^2).  Wt Readings from Last 3 Encounters:   09/27/17 205 lb (93 kg)   06/19/17 213 lb 4 oz (96.7 kg)   01/11/17 217 lb 4 oz (98.5 kg)        General Appearance:   alert, no apparent distress   HEENT:  no scleral icterus; the mucous membranes are pink and moist                                  Neck: jugular venous pressure normal   Chest: the spine is straight and the chest is symmetric   Lungs:   respirations unlabored; the lungs are clear to auscultation   Cardiovascular:   regular rhythm with normal first and second heart sounds and no murmurs or gallops; carotid pulses are intact and there are no carotid  bruits.   Abdomen:  no organomegaly, masses, bruits, or tenderness; bowel sounds are present   Extremities: no cyanosis, clubbing, or edema   Skin: no xanthelasma    General: Weight Loss  Eyes: WNL  Ears/Nose/Throat: WNL  Lungs: WNL  Heart: WNL  Stomach: Constipation  Bladder: WNL  Muscle/Joints: WNL  Skin: Poor Wound Healing  Nervous System: WNL  Mental Health: Confusion, Depression, Anxiety     Blood: WNL     Medical History  Surgical History Family History Social History   Past Medical History:   Diagnosis Date   ? Aortic stenosis    ? BPH (benign prostatic hyperplasia)    ? Chronic back pain    ? Chronic kidney disease    ? Degenerative joint disease    ? Dementia    ? Depression    ? Diabetes mellitus     type 2   ? Esophageal reflux    ? Fatigue    ? Hyperlipidemia    ? Hypertension    ? Left ventricular hypertrophy    ? Lumbar radiculopathy    ? Male erectile disorder    ? Obesity    ? ZAKIA on CPAP     machine broke at present   ? Short-term memory loss     Past Surgical History:   Procedure Laterality Date   ? APPENDECTOMY     ? CARDIAC CATHETERIZATION  6/26/14   ? CATARACT EXTRACTION Bilateral    ? CHOLECYSTECTOMY     ? ID APPENDECTOMY      Description: Appendectomy;  Recorded: 12/17/2009;   ? ID KNEE SCOPE,DIAGNOSTIC      Description: Arthroscopy Knee Left;  Proc Date: 08/12/2010;  Comments:  Daily   ? ID REMOVAL GALLBLADDER      Description: Cholecystectomy;  Recorded: 12/17/2009;    Family History   Problem Relation Age of Onset    ? Diabetes Brother     Social History     Social History   ? Marital status:      Spouse name: N/A   ? Number of children: N/A   ? Years of education: N/A     Occupational History   ? Not on file.     Social History Main Topics   ? Smoking status: Never Smoker   ? Smokeless tobacco: Never Used   ? Alcohol use No   ? Drug use: No   ? Sexual activity: Not on file     Other Topics Concern   ? Not on file     Social History Narrative          Medications  Allergies   Current Outpatient Prescriptions   Medication Sig Dispense Refill   ? aspirin 81 MG tablet Take 81 mg by mouth daily.     ? blood glucose test (ACCU-CHEK DAISHA PLUS TEST STRP) strips Test once daily. 100 strip 11   ? cetirizine (ZYRTEC) 10 MG tablet Take 10 mg by mouth daily as needed for allergies.      ? cholecalciferol, vitamin D3, 1,000 unit tablet Take 1,000 Units by mouth daily.     ? donepezil (ARICEPT) 10 MG tablet Take 1 tablet (10 mg total) by mouth daily. 30 tablet 11   ? donepezil (ARICEPT) 5 MG tablet Take 1 tablet (5 mg total) by mouth bedtime. 30 tablet 11   ? fenofibrate (TRIGLIDE) 160 MG tablet Take 1 tablet (160 mg total) by mouth daily. 90 tablet 3   ? FLUoxetine (PROZAC) 10 MG capsule Take 1 capsule (10 mg total) by mouth daily. 30 capsule 6   ? FOLIC ACID/MULTIVIT-MIN/LUTEIN (CENTRUM SILVER ORAL) Take 1 tablet by mouth daily.     ? FOLIC ACID/MV,FE,OTHER MIN (CENTRUM ORAL) Take 1 tablet by mouth daily.     ? ginseng 100 mg cap Take 1 tablet by mouth daily.     ? glipiZIDE (GLUCOTROL XL) 2.5 MG 24 hr tablet TAKE ONE TABLET BY MOUTH ONE TIME DAILY 90 tablet 0   ? HYDROcodone-acetaminophen 5-325 mg per tablet Take 1 tablet by mouth every 6 (six) hours as needed for pain. 60 tablet 0   ? LANCETS MISC Use As Directed.     ? LYRICA 50 mg capsule TAKE 1 CAPSULE (50 MG TOTAL) BY MOUTH 3 (THREE) TIMES A DAY. 90 capsule 2   ? magnesium oxide 500 mg Tab Take 500 mg by mouth daily.     ? metFORMIN (GLUCOPHAGE) 1000 MG tablet TAKE ONE  TABLET BY MOUTH TWICE DAILY WITH MEALS 60 tablet 4   ? metoprolol succinate (TOPROL-XL) 25 MG Take 1 tablet (25 mg total) by mouth daily. 90 tablet 3   ? OMEGA-3/DHA/EPA/FISH OIL (FISH OIL-OMEGA-3 FATTY ACIDS) 300-1,000 mg capsule Take 2 g by mouth daily.     ? ranitidine (ZANTAC) 150 MG tablet TAKE ONE TABLET BY MOUTH TWICE DAILY 180 tablet 3   ? tamsulosin (FLOMAX) 0.4 mg Cp24 TAKE 1 CAPSULE (0.4 MG TOTAL) BY MOUTH DAILY. 90 capsule 2   ? traZODone (DESYREL) 150 MG tablet Take 0.5 tablets (75 mg total) by mouth at bedtime. 90 tablet 1   ? acetaminophen 500 mg TbEF Take 1-2 tablets by mouth every 6 (six) hours as needed.     ? amoxicillin (AMOXIL) 500 MG capsule TAKE 4 CAPSULES BY MOUTH 1 HOUR BEFORE DENTAL APPOINTMENT 4 capsule 1   ? ascorbic acid (ASCORBIC ACID WITH LISBET HIPS) 500 MG tablet Take 500 mg by mouth daily.     ? buPROPion (WELLBUTRIN XL) 150 MG 24 hr tablet Take 1 tablet (150 mg total) by mouth daily. 30 tablet 11   ? diclofenac (VOLTAREN) 75 MG EC tablet Take 75 mg by mouth daily.      ? fluticasone (FLONASE) 50 mcg/actuation nasal spray 1 spray into each nostril 2 times a day at 6:00 am and 4:00 pm. 16 g 6   ? glipiZIDE (GLUCOTROL) 2.5 MG 24 hr tablet TAKE ONE TABLET BY MOUTH ONE TIME DAILY 90 tablet 3   ? glipiZIDE (GLUCOTROL) 2.5 MG 24 hr tablet TAKE ONE TABLET BY MOUTH ONE TIME DAILY  30 tablet 5   ? mupirocin (BACTROBAN) 2 % ointment Apply topically 2 (two) times a day. 22 g 1   ? promethazine-dextromethorphan (PROMETHAZINE-DM) 6.25-15 mg/5 mL syrup Take 5 mL by mouth every 6 (six) hours as needed for cough. 118 mL 0   ? SAW PALMETTO ORAL Take 1 capsule by mouth daily.     ? white petrolatum-mineral oil (ARTIFICIAL TEARS, CARISSA/MIN,) 83-15 % Oint Use 1cm ribbon on lower lids nightly and in the am. 3.5 g 12     No current facility-administered medications for this visit.       Allergies   Allergen Reactions   ? Codeine          Lab Results    Chemistry/lipid CBC Cardiac Enzymes/BNP/TSH/INR   Lab  Results   Component Value Date    CHOL 140 11/24/2014    HDL 27 (L) 11/24/2014    LDLCALC  11/24/2014      Comment:      Invalid, Triglycerides >300    TRIG 661 (H) 11/24/2014    CREATININE 1.40 (H) 06/19/2017    BUN 21 06/19/2017    K 4.2 06/19/2017     06/19/2017     06/19/2017    CO2 27 06/19/2017    Lab Results   Component Value Date    WBC 4.8 12/18/2015    HGB 15.9 12/18/2015    HCT 48.8 12/18/2015    MCV 99 12/18/2015     12/18/2015    Lab Results   Component Value Date    TROPONINI 0.02 05/23/2012    TSH 1.33 12/18/2015    INR 1.14 (H) 07/12/2014

## 2021-06-25 NOTE — TELEPHONE ENCOUNTER
Refill Approved    Rx renewed per Medication Renewal Policy. Medication was last renewed on 1/21/19 for 30/1  Last OV 2/13/2019    Marivel Prasad, [facility] Triage/Med Refill 3/22/2019     Requested Prescriptions   Pending Prescriptions Disp Refills     donepezil (ARICEPT) 5 MG tablet [Pharmacy Med Name: DONEPEZIL HCL 5 MG TABLET] 30 tablet 1     Sig: TAKE 1 TABLET BY MOUTH EVERYDAY AT BEDTIME. DUE FOR AN APPOINTMENT.    Cholinesterase Inhibitor/Anti-Alzheimer Agent Refill Protocol Passed - 3/22/2019  2:12 AM       Passed - Visit with PCP or prescribing provider visit in last 6 months or next 3 months    Last office visit with prescriber/PCP: 2/13/2019 OR same dept: 2/13/2019 Francisco Javier Rojas MD OR same specialty: 2/13/2019 Francisco Javier Rojas MD Last physical: Visit date not found Last MTM visit: Visit date not found     Next appt within 3 mo: Visit date not found  Next physical within 3 mo: Visit date not found  Prescriber OR PCP: Francisco Javier Rojas MD  Last diagnosis associated with med order: 1. Lewy body dementia  - donepezil (ARICEPT) 5 MG tablet [Pharmacy Med Name: DONEPEZIL HCL 5 MG TABLET]; TAKE 1 TABLET BY MOUTH EVERYDAY AT BEDTIME. DUE FOR AN APPOINTMENT.  Dispense: 30 tablet; Refill: 1    If protocol passes may refill for 6 months if within 3 months of last provider visit (or a total of 9 months).

## 2021-06-25 NOTE — TELEPHONE ENCOUNTER
Refill Approved    Rx renewed per Medication Renewal Policy. Medication was last renewed on 6/7/2018.           Ron Lawrence, [facility] Connection Triage/Med Refill 3/14/2019     Requested Prescriptions   Pending Prescriptions Disp Refills     ranitidine (ZANTAC) 150 MG tablet [Pharmacy Med Name: RANITIDINE 150 MG TABLET] 180 tablet 2     Sig: TAKE ONE TABLET BY MOUTH TWICE DAILY    GI Medications Refill Protocol Passed - 3/11/2019  1:24 AM       Passed - PCP or prescribing provider visit in last 12 or next 3 months.    Last office visit with prescriber/PCP: 2/13/2019 Francisco Javier Rojas MD OR same dept: 2/13/2019 Francisco Javier Rojas MD OR same specialty: 2/13/2019 Francisco Javier Rojas MD  Last physical: 11/1/2017 Last MTM visit: Visit date not found   Next visit within 3 mo: Visit date not found  Next physical within 3 mo: Visit date not found  Prescriber OR PCP: Francisco Javier Rojas MD  Last diagnosis associated with med order: 1. Esophageal reflux  - ranitidine (ZANTAC) 150 MG tablet [Pharmacy Med Name: RANITIDINE 150 MG TABLET]; TAKE ONE TABLET BY MOUTH TWICE DAILY  Dispense: 180 tablet; Refill: 2    If protocol passes may refill for 12 months if within 3 months of last provider visit (or a total of 15 months).

## 2021-06-26 ENCOUNTER — HEALTH MAINTENANCE LETTER (OUTPATIENT)
Age: 81
End: 2021-06-26

## 2021-06-26 NOTE — PROGRESS NOTES
Progress Notes by Anastasiia Hu MD at 10/1/2018  3:10 PM     Author: Anastasiia Hu MD Service: -- Author Type: Physician    Filed: 10/2/2018 12:56 PM Encounter Date: 10/1/2018 Status: Signed    : Anastasiia Hu MD (Physician)           Click to link to [facility] HEART CARE NOTE    Thank you, Dr. Rojas, for asking us to see Nestor Peterson at the [facility].      Assessment/Recommendations   Assessment/plan:    1.  Coronary artery disease: Status post coronary artery bypass surgery with LIMA to LAD.  Circumflex was too small for bypass.  No anginal complaints.  He is currently on aspirin and fenofibrate.  He should be on a statin however this was stopped as he and his wife are concerned about his progressive dementia and whether or not it was statin related.  2.  Aortic stenosis: Status post bioprosthetic aortic valve replacement.  He needs prophylaxis prior to dental procedures.  Repeat echocardiogram to reevaluate AVR.  3.  Hypertension: Blood pressure borderline elevated.  On previous visits it has been well controlled.  Follow-up in 1 year.       History of Present Illness    Mr. Nestor Peterson is a 78 y.o. male with history of coronary artery disease status post LIMA to LAD bypass with residual circumflex disease (target too small for bypass) and bioprosthetic aortic valve replacement in 07/2014, dementia, ZAKIA does not tolerate CPAP and depression here for a follow up.      He has had problems now with progressive dementia.  He is here accompanied by his wife.  He has not noted any problems with chest discomfort or breathing difficulty.  Denies any problems orthopnea, lightheadedness or increased edema.       Physical Examination Review of Systems   Vitals:    10/01/18 1509   BP: 142/58   Pulse: 88   Resp: 18     Body mass index is 30.4 kg/(m^2).  Wt Readings from Last 3 Encounters:   10/01/18 218 lb (98.9 kg)   07/16/18 217 lb (98.4  kg)   06/11/18 215 lb (97.5 kg)       General Appearance:   alert, no apparent distress   HEENT:  no scleral icterus; the mucous membranes are pink and moist                                  Neck: jugular venous pressure normal, no thyromegaly   Chest: the spine is straight and the chest is symmetric   Lungs:   respirations unlabored; the lungs are clear to auscultation   Cardiovascular:   regular rhythm with normal first and second heart sounds and no murmurs or gallops there are no carotid bruits.   Abdomen:  no organomegaly, masses, bruits, or tenderness; bowel sounds are present   Extremities: no cyanosis, clubbing, or edema   Skin: no xanthelasma    General: WNL  Eyes: WNL  Ears/Nose/Throat: Hearing Loss  Lungs: Snoring  Heart: WNL  Stomach: Constipation  Bladder: Frequent Urination at Night  Muscle/Joints: Muscle Weakness, Muscle Pain  Skin: WNL  Nervous System: WNL  Mental Health: WNL     Blood: WNL     Medical History  Surgical History Family History Social History   Past Medical History:   Diagnosis Date   ? Aortic stenosis    ? BPH (benign prostatic hyperplasia)    ? Chronic back pain    ? Chronic kidney disease    ? Degenerative joint disease    ? Dementia    ? Depression    ? Diabetes mellitus (H)     type 2   ? Esophageal reflux    ? Fatigue    ? Hyperlipidemia    ? Hypertension    ? Left ventricular hypertrophy    ? Lumbar radiculopathy    ? Male erectile disorder    ? Obesity    ? ZAKIA on CPAP     machine broke at present   ? Short-term memory loss     Past Surgical History:   Procedure Laterality Date   ? APPENDECTOMY     ? CARDIAC CATHETERIZATION  6/26/14   ? CATARACT EXTRACTION Bilateral    ? CHOLECYSTECTOMY     ? IN APPENDECTOMY      Description: Appendectomy;  Recorded: 12/17/2009;   ? IN KNEE SCOPE,DIAGNOSTIC      Description: Arthroscopy Knee Left;  Proc Date: 08/12/2010;  Comments: Dr Waters   ? IN REMOVAL GALLBLADDER      Description: Cholecystectomy;  Recorded: 12/17/2009;    Family History    Problem Relation Age of Onset   ? Diabetes Brother     Social History     Social History   ? Marital status:      Spouse name: N/A   ? Number of children: N/A   ? Years of education: N/A     Occupational History   ? Not on file.     Social History Main Topics   ? Smoking status: Never Smoker   ? Smokeless tobacco: Never Used   ? Alcohol use No   ? Drug use: No   ? Sexual activity: No     Other Topics Concern   ? Not on file     Social History Narrative          Medications  Allergies   Current Outpatient Prescriptions   Medication Sig Dispense Refill   ? ACCU-CHEK DAISHA PLUS TEST STRP strips USE TO TEST BLOOD SUGAR ONCE A DAY AS DIRECTED 100 strip 3   ? amoxicillin (AMOXIL) 500 MG capsule TAKE 4 CAPSULES BY MOUTH 1 HOUR BEFORE DENTAL APPOINTMENT 4 capsule 1   ? ascorbic acid (ASCORBIC ACID WITH LISBET HIPS) 500 MG tablet Take 500 mg by mouth daily.     ? aspirin 81 MG tablet Take 81 mg by mouth daily.     ? buPROPion (WELLBUTRIN XL) 150 MG 24 hr tablet Take 1 tablet (150 mg total) by mouth daily. 30 tablet 11   ? cetirizine (ZYRTEC) 10 MG tablet Take 10 mg by mouth daily as needed for allergies.      ? cholecalciferol, vitamin D3, 1,000 unit tablet Take 1,000 Units by mouth daily.     ? diclofenac (VOLTAREN) 75 MG EC tablet TAKE 1 TABLET BY MOUTH TWICE A DAY 60 tablet 1   ? donepezil (ARICEPT) 10 MG tablet Take 1 tablet (10 mg total) by mouth daily. 30 tablet 6   ? donepezil (ARICEPT) 5 MG tablet TAKE 1 TABLET (5 MG TOTAL) BY MOUTH BEDTIME. 30 tablet 11   ? fenofibrate (TRIGLIDE) 160 MG tablet Take 1 tablet (160 mg total) by mouth daily. 90 tablet 3   ? FLUoxetine (PROZAC) 10 MG capsule Take 1 capsule (10 mg total) by mouth daily. 90 capsule 3   ? fluticasone (FLONASE) 50 mcg/actuation nasal spray 1 spray into each nostril 2 times a day at 6:00 am and 4:00 pm. 16 g 6   ? FOLIC ACID/MULTIVIT-MIN/LUTEIN (CENTRUM SILVER ORAL) Take 1 tablet by mouth daily.     ? FOLIC ACID/MV,FE,OTHER MIN (CENTRUM ORAL) Take 1  tablet by mouth daily.     ? ginseng 100 mg cap Take 1 tablet by mouth daily.     ? glipiZIDE (GLUCOTROL XL) 2.5 MG 24 hr tablet Take 1 tablet (2.5 mg total) by mouth daily. 90 tablet 3   ? HYDROcodone-acetaminophen 5-325 mg per tablet Take 1 tablet by mouth every 6 (six) hours as needed for pain. 60 tablet 0   ? LANCETS MISC Use As Directed.     ? LYRICA 50 mg capsule TAKE 1 CAPSULE (50 MG) BY MOUTH THREE TIMES DAILY 90 capsule 2   ? magnesium oxide 500 mg Tab Take 500 mg by mouth daily.     ? metFORMIN (GLUCOPHAGE) 1000 MG tablet TAKE 1 TABLET (1,000 MG TOTAL) BY MOUTH 2 (TWO) TIMES A DAY WITH MEALS. 180 tablet 2   ? mupirocin (BACTROBAN) 2 % ointment Apply topically 2 (two) times a day. 22 g 1   ? OMEGA-3/DHA/EPA/FISH OIL (FISH OIL-OMEGA-3 FATTY ACIDS) 300-1,000 mg capsule Take 2 g by mouth daily.     ? promethazine-dextromethorphan (PROMETHAZINE-DM) 6.25-15 mg/5 mL syrup Take 5 mL by mouth every 6 (six) hours as needed for cough. 118 mL 0   ? ranitidine (ZANTAC) 150 MG tablet TAKE ONE TABLET BY MOUTH TWICE DAILY 180 tablet 2   ? SAW PALMETTO ORAL Take 1 capsule by mouth daily.     ? tamsulosin (FLOMAX) 0.4 mg Cp24 Take 1 capsule (0.4 mg total) by mouth daily. 90 capsule 2   ? traZODone (DESYREL) 150 MG tablet TAKE 0.5 TABLETS (75 MG TOTAL) BY MOUTH AT BEDTIME. 90 tablet 1   ? white petrolatum-mineral oil (ARTIFICIAL TEARS, CARISSA/MIN,) 83-15 % Oint Use 1cm ribbon on lower lids nightly and in the am. 3.5 g 12   ? metoprolol succinate (TOPROL-XL) 25 MG TAKE 1 TABLET (25 MG TOTAL) BY MOUTH DAILY. 90 tablet 3     No current facility-administered medications for this visit.       Allergies   Allergen Reactions   ? Codeine          Lab Results    Chemistry/lipid CBC Cardiac Enzymes/BNP/TSH/INR   Lab Results   Component Value Date    CHOL 140 11/24/2014    HDL 27 (L) 11/24/2014    LDLCALC  11/24/2014      Comment:      Invalid, Triglycerides >300    TRIG 661 (H) 11/24/2014    CREATININE 1.47 (H) 07/16/2018    BUN 32 (H)  07/16/2018    K 4.7 07/16/2018     07/16/2018     (H) 07/16/2018    CO2 24 07/16/2018    Lab Results   Component Value Date    WBC 4.9 04/05/2018    HGB 16.6 04/05/2018    HCT 48.2 04/05/2018    MCV 96 04/05/2018     04/05/2018    Lab Results   Component Value Date    TROPONINI 0.02 05/23/2012    TSH 1.33 12/18/2015    INR 1.14 (H) 07/12/2014

## 2021-06-28 NOTE — NURSING NOTE
Spoke with the patient and he will call back to schedule after he discusses with his son which location would work the best.

## 2021-06-28 NOTE — PROGRESS NOTES
Progress Notes by Anastasiia Hu MD at 1/14/2020  3:10 PM     Author: Anastasiia Hu MD Service: -- Author Type: Physician    Filed: 1/14/2020  4:55 PM Encounter Date: 1/14/2020 Status: Signed    : Anastasiia Hu MD (Physician)           Thank you, Dr. Rojas, for asking us to see Nestor Peterson at the [facility].      Assessment/Recommendations   Assessment/plan:    1.  Coronary artery disease: Status post coronary artery bypass surgery with LIMA to LAD.  Circumflex was too small for bypass.  No anginal complaints.  He is currently on aspirin and fenofibrate.  He should be on a statin however this was stopped as he and his wife are concerned about his progressive dementia and whether or not it was statin related.  2.  Aortic stenosis: Status post bioprosthetic aortic valve replacement.  He needs prophylaxis prior to dental procedures.    3.  Hypertension: Blood pressure well controlled today.  4.  Dyslipidemia    Plan:  1.  Recommend prophylaxis prior to dental procedures  2.  Continue on daily aspirin and fenofibrate.  Patient has declined statin therapy  3.  We will check CMP and LDL today.  Follow-up in a year       History of Present Illness    Mr. Nestor Peterson is a 79 y.o. male with history of coronary artery disease status post LIMA to LAD bypass with residual circumflex disease (target too small for bypass) and bioprosthetic aortic valve replacement in 07/2014, dementia, ZAKIA does not tolerate CPAP, dementia, hearing loss and depression here for a follow up.  He complains of prostate issues as well as issues with his bowels today.  He has not noted any problems with lower extremity edema, worsening shortness of breath.  Denies any chest pain or palpitations.  He is here today accompanied by his wife.       Physical Examination Review of Systems   Vitals:    01/14/20 1511   BP: 128/68   Pulse: 60   Resp: 16     Body mass index is 28.59 kg/m .  Wt  Readings from Last 3 Encounters:   01/14/20 205 lb (93 kg)   11/04/19 202 lb (91.6 kg)   02/13/19 218 lb (98.9 kg)       General Appearance:   alert, no apparent distress   HEENT:  no scleral icterus; the mucous membranes are pink and moist                                  Neck: No jvd   Chest: the spine is straight and the chest is symmetric   Lungs:   respirations unlabored; the lungs are clear to auscultation   Cardiovascular:   regular rhythm with normal first and second heart sounds and no murmurs or gallops;  there are no carotid bruits.   Abdomen:  no organomegaly, masses, bruits, or tenderness; bowel sounds are present   Extremities: no edema   Skin: no xanthelasma    General: Weight Loss  Eyes: WNL  Ears/Nose/Throat: Hearing Loss  Lungs: Snoring  Heart: WNL  Stomach: Diarrhea  Bladder: Frequent Urination at Night  Muscle/Joints: WNL  Skin: Rash  Nervous System: Daytime Sleepiness  Mental Health: WNL     Blood: WNL     Medical History  Surgical History Family History Social History   Past Medical History:   Diagnosis Date   ? Aortic stenosis    ? BPH (benign prostatic hyperplasia)    ? Chronic back pain    ? Chronic kidney disease    ? Degenerative joint disease    ? Dementia (H)    ? Depression    ? Diabetes mellitus (H)     type 2   ? Esophageal reflux    ? Fatigue    ? Hyperlipidemia    ? Hypertension    ? Left ventricular hypertrophy    ? Lumbar radiculopathy    ? Male erectile disorder    ? Obesity    ? ZAKIA on CPAP     machine broke at present   ? Short-term memory loss     Past Surgical History:   Procedure Laterality Date   ? APPENDECTOMY     ? CARDIAC CATHETERIZATION  6/26/14   ? CATARACT EXTRACTION Bilateral    ? CHOLECYSTECTOMY     ? [place] APPENDECTOMY      Description: Appendectomy;  Recorded: 12/17/2009;   ? [place] KNEE SCOPE,DIAGNOSTIC      Description: Arthroscopy Knee Left;  Proc Date: 08/12/2010;  Comments: Dr Waters   ? [place] REMOVAL GALLBLADDER      Description: Cholecystectomy;  Recorded: 12/17/2009;     Family History   Problem Relation Age of Onset   ? Diabetes Brother     Social History     Socioeconomic History   ? Marital status:      Spouse name: Not on file   ? Number of children: Not on file   ? Years of education: Not on file   ? Highest education level: Not on file   Occupational History   ? Not on file   Social Needs   ? Financial resource strain: Not on file   ? Food insecurity:     Worry: Not on file     Inability: Not on file   ? Transportation needs:     Medical: Not on file     Non-medical: Not on file   Tobacco Use   ? Smoking status: Never Smoker   ? Smokeless tobacco: Never Used   Substance and Sexual Activity   ? Alcohol use: No   ? Drug use: No   ? Sexual activity: Never   Lifestyle   ? Physical activity:     Days per week: Not on file     Minutes per session: Not on file   ? Stress: Not on file   Relationships   ? Social connections:     Talks on phone: Not on file     Gets together: Not on file     Attends Christian service: Not on file     Active member of club or organization: Not on file     Attends meetings of clubs or organizations: Not on file     Relationship status: Not on file   ? Intimate partner violence:     Fear of current or ex partner: Not on file     Emotionally abused: Not on file     Physically abused: Not on file     Forced sexual activity: Not on file   Other Topics Concern   ? Not on file   Social History Narrative   ? Not on file          Medications  Allergies   Current Outpatient Medications   Medication Sig Dispense Refill   ? ACCU-CHEK DAISHA PLUS TEST STRP strips USE TO TEST BLOOD SUGAR ONCE A DAY AS DIRECTED 100 strip 3   ? amoxicillin (AMOXIL) 500 MG capsule TAKE 4 CAPSULES BY MOUTH 1 HOUR BEFORE DENTAL APPOINTMENT 4 capsule 1   ? aspirin 81 MG tablet Take 81 mg by mouth daily.     ? cetirizine (ZYRTEC) 10 MG tablet Take 10 mg by mouth daily as needed for allergies.      ? cholecalciferol, vitamin D3, 1,000 unit tablet Take 1,000 Units by mouth daily.      ? donepezil (ARICEPT) 10 MG tablet TAKE 1 TABLET BY MOUTH EVERY DAY 90 tablet 3   ? fenofibrate (TRIGLIDE) 160 MG tablet TAKE 1 TABLET BY MOUTH EVERY DAY 90 tablet 0   ? finasteride (PROSCAR) 5 mg tablet TAKE 1 TABLET BY MOUTH EVERY DAY 90 tablet 3   ? FLUoxetine (PROZAC) 10 MG capsule TAKE 1 CAPSULE BY MOUTH EVERY DAY (Patient taking differently: Take 10 mg by mouth daily as needed. ) 90 capsule 2   ? fluticasone (FLONASE) 50 mcg/actuation nasal spray 1 spray into each nostril 2 times a day at 6:00 am and 4:00 pm. 16 g 6   ? FOLIC ACID/MULTIVIT-MIN/LUTEIN (CENTRUM SILVER ORAL) Take 1 tablet by mouth daily.     ? glipiZIDE (GLUCOTROL XL) 2.5 MG 24 hr tablet TAKE 1 TABLET (2.5 MG TOTAL) BY MOUTH DAILY. 90 tablet 3   ? HYDROcodone-acetaminophen 5-325 mg per tablet Take 1 tablet by mouth every 6 (six) hours as needed for pain. 60 tablet 0   ? LANCETS MISC Use As Directed.     ? LYRICA 50 mg capsule TAKE 1 CAPSULE (50 MG) BY MOUTH THREE TIMES DAILY (Patient taking differently: Take 50 mg by mouth daily. ) 90 capsule 2   ? metFORMIN (GLUCOPHAGE) 1000 MG tablet Take 1 tablet (1,000 mg total) by mouth 2 (two) times a day with meals. 180 tablet 2   ? metoprolol succinate (TOPROL-XL) 25 MG TAKE 1 TABLET BY MOUTH EVERY DAY 90 tablet 0   ? tamsulosin (FLOMAX) 0.4 mg cap Take 1 capsule (0.4 mg total) by mouth daily. 90 capsule 3   ? traZODone (DESYREL) 100 MG tablet Take 1 tablet (100 mg total) by mouth at bedtime. 90 tablet 3   ? donepezil (ARICEPT) 5 MG tablet TAKE 1 TABLET BY MOUTH EVERYDAY AT BEDTIME. DUE FOR AN APPOINTMENT. 90 tablet 3   ? ranitidine (ZANTAC) 150 MG tablet TAKE ONE TABLET BY MOUTH TWICE DAILY 180 tablet 3   ? white petrolatum-mineral oil (ARTIFICIAL TEARS, CARISSA/MIN,) 83-15 % Oint Use 1cm ribbon on lower lids nightly and in the am. 3.5 g 12     No current facility-administered medications for this visit.       Allergies   Allergen Reactions   ? Codeine          Lab Results    Chemistry/lipid CBC Cardiac  Enzymes/BNP/TSH/INR   Lab Results   Component Value Date    CHOL 140 11/24/2014    HDL 27 (L) 11/24/2014    LDLCALC  11/24/2014      Comment:      Invalid, Triglycerides >300    TRIG 661 (H) 11/24/2014    CREATININE 1.54 (H) 02/13/2019    BUN 18 02/13/2019    K 4.9 02/13/2019     02/13/2019     02/13/2019    CO2 27 02/13/2019    Lab Results   Component Value Date    WBC 4.9 04/05/2018    HGB 16.6 04/05/2018    HCT 48.2 04/05/2018    MCV 96 04/05/2018     04/05/2018    Lab Results   Component Value Date    TROPONINI 0.02 05/23/2012    TSH 1.33 12/18/2015    INR 1.14 (H) 07/12/2014

## 2021-06-30 ENCOUNTER — TELEPHONE (OUTPATIENT)
Dept: SLEEP MEDICINE | Facility: CLINIC | Age: 81
End: 2021-06-30

## 2021-06-30 NOTE — PROGRESS NOTES
Progress Notes by Anastasiia Hu MD at 3/26/2021  1:50 PM     Author: Anastasiia Hu MD Service: -- Author Type: Physician    Filed: 3/26/2021  4:14 PM Encounter Date: 3/26/2021 Status: Signed    : Anastasiia Hu MD (Physician)           Thank you, Dr. Rojas, for asking us to see Nestor Peterson at the [facility].      Assessment/Recommendations   Assessment/plan:    1.  Coronary artery disease: Status post coronary artery bypass surgery with LIMA to LAD.  Circumflex was too small for bypass.  No anginal complaints.    2.  Aortic stenosis: Status post bioprosthetic aortic valve replacement.  He needs prophylaxis prior to dental procedures.    3.  Hypertension: Blood pressure well controlled today.  4.  Dyslipidemia     Plan:  1.  Recommend prophylaxis prior to dental procedures  2.  Continue on daily aspirin. Patient has declined statin therapy  3.  Echocardiogram  Follow-up in a year     History of Present Illness    Mr. Nestor Peterson is a 80 y.o. male with history of coronary artery disease status post LIMA to LAD bypass with residual circumflex disease (target too small for bypass) and bioprosthetic aortic valve replacement in 07/2014, dementia, ZAKIA does not tolerate CPAP, dementia, hearing loss and depression here for a follow up.  He is here today accompanied by his daughter.  I was sorry to hear that his wife passed away a couple weeks ago due to kidney failure.  He himself has been doing well.  He has stopped his cholesterol medication due to issues with dementia and concern about side effects.  Otherwise feeling well without breathing difficulty or chest pain.  He is scheduled to get hearing aids.       Physical Examination Review of Systems   Vitals:    03/26/21 1406   BP: 132/68   Pulse: (!) 57   Resp: 20   SpO2: 97%     Body mass index is 28.7 kg/m .  Wt Readings from Last 3 Encounters:   03/26/21 200 lb (90.7 kg)   03/08/21 201 lb (91.2 kg)    11/10/20 198 lb (89.8 kg)       General Appearance:   alert, no apparent distress   HEENT:  no scleral icterus; the mucous membranes are pink and moist                                  Neck: No jvd   Chest: the spine is straight and the chest is symmetric   Lungs:   respirations unlabored; the lungs are clear to auscultation   Cardiovascular:   regular rhythm with normal first and second heart sounds and no murmurs or gallops   Abdomen:  no organomegaly, masses, bruits, or tenderness; bowel sounds are present   Extremities: no cyanosis, clubbing, or edema   Skin: no xanthelasma    General: Weight Gain  Eyes: WNL  Ears/Nose/Throat: WNL  Lungs: WNL  Heart: Arm Pain  Stomach: WNL  Bladder: WNL  Muscle/Joints: Muscle Pain  Skin: WNL  Nervous System: WNL  Mental Health: WNL     Blood: WNL     Medical History  Surgical History Family History Social History   Past Medical History:   Diagnosis Date   ? Aortic stenosis    ? BPH (benign prostatic hyperplasia)    ? Chronic back pain    ? Chronic kidney disease    ? Degenerative joint disease    ? Dementia (H)    ? Depression    ? Diabetes mellitus (H)     type 2   ? Esophageal reflux    ? Fatigue    ? Hyperlipidemia    ? Hypertension    ? Left ventricular hypertrophy    ? Lumbar radiculopathy    ? Male erectile disorder    ? Obesity    ? ZAKIA on CPAP     machine broke at present   ? Short-term memory loss     Past Surgical History:   Procedure Laterality Date   ? APPENDECTOMY     ? CARDIAC CATHETERIZATION  6/26/14   ? CATARACT EXTRACTION Bilateral    ? CHOLECYSTECTOMY     ? [place] APPENDECTOMY      Description: Appendectomy;  Recorded: 12/17/2009;   ? [place] KNEE SCOPE,DIAGNOSTIC      Description: Arthroscopy Knee Left;  Proc Date: 08/12/2010;  Comments: Dr Waters   ? [place] REMOVAL GALLBLADDER      Description: Cholecystectomy;  Recorded: 12/17/2009;    Family History   Problem Relation Age of Onset   ? Diabetes Brother     Social History     Socioeconomic History   ? Marital status:       Spouse name: Not on file   ? Number of children: Not on file   ? Years of education: Not on file   ? Highest education level: Not on file   Occupational History   ? Not on file   Social Needs   ? Financial resource strain: Not on file   ? Food insecurity     Worry: Not on file     Inability: Not on file   ? Transportation needs     Medical: Not on file     Non-medical: Not on file   Tobacco Use   ? Smoking status: Never Smoker   ? Smokeless tobacco: Never Used   Substance and Sexual Activity   ? Alcohol use: No   ? Drug use: No   ? Sexual activity: Never   Lifestyle   ? Physical activity     Days per week: Not on file     Minutes per session: Not on file   ? Stress: Not on file   Relationships   ? Social connections     Talks on phone: Not on file     Gets together: Not on file     Attends Sikh service: Not on file     Active member of club or organization: Not on file     Attends meetings of clubs or organizations: Not on file     Relationship status: Not on file   ? Intimate partner violence     Fear of current or ex partner: Not on file     Emotionally abused: Not on file     Physically abused: Not on file     Forced sexual activity: Not on file   Other Topics Concern   ? Not on file   Social History Narrative   ? Not on file          Medications  Allergies   Current Outpatient Medications   Medication Sig Dispense Refill   ? ACCU-CHEK DAISHA PLUS TEST STRP strips USE TO TEST BLOOD SUGAR ONCE A DAY AS DIRECTED 100 strip 3   ? aspirin 81 MG tablet Take 81 mg by mouth daily.     ? donepeziL (ARICEPT) 5 MG tablet TAKE 1 TABLET BY MOUTH EVERYDAY AT BEDTIME. 90 tablet 3   ? finasteride (PROSCAR) 5 mg tablet TAKE 1 TABLET BY MOUTH EVERY DAY 90 tablet 3   ? FLUoxetine (PROZAC) 10 MG capsule TAKE 1 CAPSULE BY MOUTH EVERY DAY 90 capsule 3   ? FOLIC ACID/MULTIVIT-MIN/LUTEIN (CENTRUM SILVER ORAL) Take 1 tablet by mouth daily.     ? glipiZIDE (GLUCOTROL XL) 2.5 MG 24 hr tablet TAKE 1 TABLET (2.5 MG TOTAL) BY  MOUTH DAILY. 90 tablet 0   ? LANCETS MISC Use As Directed.     ? metFORMIN (GLUCOPHAGE) 1000 MG tablet TAKE 1 TABLET BY MOUTH TWICE A DAY WITH MEALS 180 tablet 3   ? metoprolol succinate (TOPROL-XL) 25 MG TAKE 1 TABLET BY MOUTH EVERY DAY 90 tablet 1   ? pregabalin (LYRICA) 50 MG capsule Take 1 capsule (50 mg total) by mouth at bedtime. 90 capsule 3   ? tamsulosin (FLOMAX) 0.4 mg cap TAKE 1 CAPSULE BY MOUTH EVERY DAY 90 capsule 2   ? traZODone (DESYREL) 100 MG tablet TAKE 1 TABLET BY MOUTH EVERYDAY AT BEDTIME 90 tablet 3   ? white petrolatum-mineral oil (ARTIFICIAL TEARS, CARISSA/MIN,) 83-15 % Oint Use 1cm ribbon on lower lids nightly and in the am. 3.5 g 12     No current facility-administered medications for this visit.       Allergies   Allergen Reactions   ? Codeine          Lab Results    Chemistry/lipid CBC Cardiac Enzymes/BNP/TSH/INR   Lab Results   Component Value Date    CHOL 140 11/24/2014    HDL 27 (L) 11/24/2014    LDLCALC  11/24/2014      Comment:      Invalid, Triglycerides >300    TRIG 661 (H) 11/24/2014    CREATININE 1.43 (H) 03/08/2021    BUN 22 03/08/2021    K 4.0 03/08/2021     03/08/2021     03/08/2021    CO2 27 03/08/2021    Lab Results   Component Value Date    WBC 4.9 04/05/2018    HGB 16.6 04/05/2018    HCT 48.2 04/05/2018    MCV 96 04/05/2018     04/05/2018    Lab Results   Component Value Date    TROPONINI 0.02 05/23/2012    TSH 1.33 12/18/2015    INR 1.14 (H) 07/12/2014

## 2021-06-30 NOTE — PROGRESS NOTES
"Progress Notes by Lisa Urena AuD at 3/24/2021  9:20 AM     Author: Lisa Urena AuD Service: -- Author Type: Audiologist    Filed: 3/24/2021 10:34 AM Encounter Date: 3/24/2021 Status: Signed    : Lisa Urena AuD (Audiologist)       Audiology Report    Summary: Audiology visit completed. Please see audiogram below or in \"media\" tab for case history and results.     Plan: The patient is returned to ENT for follow up. Return for retesting with ENT recommendation. Bilateral hearing aid candidate. Return for hearing aid evaluation if so desired.    Yadira Alcantara, CCC-A  Clinical Audiologist   [place] #[id number]             "

## 2021-06-30 NOTE — TELEPHONE ENCOUNTER
Reason for Call:  Other appointment    Detailed comments: patients son Bassem called and would like to schedule [facility]  Please contact Bassem (if consent).  Thank you.    Phone Number Patient can be reached at: Other phone number:  [phone]*    Best Time: any    Can we leave a detailed message on this number? YES if consent     Call taken on 6/30/2021 at 9:00 AM by Frieda Jones

## 2021-07-08 NOTE — TELEPHONE ENCOUNTER
Reason for call:  Other   Patient called regarding (reason for call): appointment  Additional comments: Per Bassem - Needs to schedule my fathers sleep study that was placed by Florencio Purcell MD    Phone number to reach patient:  Cell number on file:    Telephone Information:   Mobile [phone]       Best Time:  ANYTIME    Can we leave a detailed message on this number?  YES    Travel screening: Not Applicable

## 2021-08-06 NOTE — PATIENT INSTRUCTIONS - HE
Patient Instructions by Francisco Javier Rojas MD at 3/8/2021  5:20 PM     Author: Francisco Javier Rojas MD Service: -- Author Type: Physician    Filed: 3/8/2021  6:39 PM Encounter Date: 3/8/2021 Status: Signed    : Francisco Javier Rojas MD (Physician)         Patient Education     Your Health Risk Assessment indicates you feel you are not in good physical health.    A healthy lifestyle helps keep the body fit and the mind alert. It helps protect you from disease, helps you fight disease, and helps prevent chronic disease (disease that doesn't go away) from getting worse. This is important as you get older and begin to notice twinges in muscles and joints and a decline in the strength and stamina you once took for granted. A healthy lifestyle includes good healthcare, good nutrition, weight control, recreation, and regular exercise. Avoid harmful substances and do what you can to keep safe. Another part of a healthy lifestyle is stay mentally active and socially involved.    Good healthcare     Have a wellness visit every year.     If you have new symptoms, let us know right away. Don't wait until the next checkup.     Take medicines exactly as prescribed and keep your medicines in a safe place. Tell us if your medicine causes problems.   Healthy diet and weight control     Eat 3 or 4 small, nutritious, low-fat, high-fiber meals a day. Include a variety of fruits, vegetables, and whole-grain foods.     Make sure you get enough calcium in your diet. Calcium, vitamin D, and exercise help prevent osteoporosis (bone thinning).     If you live alone, try eating with others when you can. That way you get a good meal and have company while you eat it.     Try to keep a healthy weight. If you eat more calories than your body uses for energy, it will be stored as fat and you will gain weight.     Recreation   Recreation is not limited to sports and team events. It includes any activity that provides relaxation, interest, enjoyment, and  exercise. Recreation provides an outlet for physical, mental, and social energy. It can give a sense of worth and achievement. It can help you stay healthy.       Patient Education     Exercise for a Healthier Heart  You may wonder how you can improve the health of your heart. If youre thinking about exercise, youre on the right track. You dont need to become an athlete, but you do need a certain amount of brisk exercise to help strengthen your heart. If you have been diagnosed with a heart condition, your doctor may recommend exercise to help stabilize your condition. To help make exercise a habit, choose safe, fun activities.       Be sure to check with your health care provider before starting an exercise program.    Why exercise?  Exercising regularly offers many healthy rewards. It can help you do all of the following:    Improve your blood cholesterol levels to help prevent further heart trouble    Lower your blood pressure to help prevent a stroke or heart attack    Control diabetes, or reduce your risk of getting this disease    Improve your heart and lung function    Reach and maintain a healthy weight    Make your muscles stronger and more limber so you can stay active    Prevent falls and fractures by slowing the loss of bone mass (osteoporosis)    Manage stress better  Exercise tips  Ease into your routine. Set small goals. Then build on them.  Exercise on most days. Aim for a total of 150 or more minutes of moderate to  vigorous intensity activity each week. Consider 40 minutes, 3 to 4 times a week. For best results, activity should last for 40 minutes on average. It is OK to work up to the 40 minute period over time. Examples of moderate-intensity activity is walking one mile in 15 minutes or 30 to 45 minutes of yard work.  Step up your daily activity level. Along with your exercise program, try being more active throughout the day. Walk instead of drive. Do more household tasks or yard work.  Choose  one or more activities you enjoy. Walking is one of the easiest things you can do. You can also try swimming, riding a bike, or taking an exercise class.  Stop exercising and call your doctor if you:    Have chest pain or feel dizzy or lightheaded    Feel burning, tightness, pressure, or heaviness in your chest, neck, shoulders, back, or arms    Have unusual shortness of breath    Have increased joint or muscle pain    Have palpitations or an irregular heartbeat      9285-7853 [employer]. [address] [place] [address]. All rights reserved. This information is not intended as a substitute for professional medical care. Always follow your healthcare professional's instructions.         Patient Education   Understanding [employer] MyPlate  The USDA (US Department of Agriculture) has guidelines to help you make healthy food choices. These are called MyPlate. MyPlate shows the food groups that make up healthy meals using the image of a place setting. Before you eat, think about the healthiest choices for what to put onto your plate or into your cup or bowl. To learn more about building a healthy plate, visit www.choosemyplate.gov.       The Food Groups    Fruits: Any fruit or 100% fruit juice counts as part of the Fruit Group. Fruits may be fresh, canned, frozen, or dried, and may be whole, cut-up, or pureed. Make half your plate fruits and vegetables.    Vegetables: Any vegetable or 100% vegetable juice counts as a member of the Vegetable Group. Vegetables may be fresh, frozen, canned, or dried. They can be served raw or cooked and may be whole, cut-up, or mashed. Make half your plate fruits and vegetables.     Grains: All foods made from grains are part of the Grains Group. These include wheat, rice, oats, cornmeal, and barley such as bread, pasta, oatmeal, cereal, tortillas, and grits. Grains should be no more than a quarter of your plate. At least half of your grains should be whole  grains.    Protein: This group includes meat, poultry, seafood, beans and peas, eggs, processed soy products (like tofu), nuts (including nut butters), and seeds. Make protein choices no more than a quarter of your plate. Meat and poultry choices should be lean or low fat.    Dairy: All fluid milk products and foods made from milk that contain calcium, like yogurt and cheese are part of the Dairy Group. (Foods that have little calcium, such as cream, butter, and cream cheese, are not part of the group.) Most dairy choices should be low-fat or fat-free.    Oils: These are fats that are liquid at room temperature. They include canola, corn, olive, soybean, and sunflower oil. Foods that are mainly oil include mayonnaise, certain salad dressings, and soft margarines. You should have only 5 to 7 teaspoons of oils a day. You probably already get this much from the food you eat.  Use [employer] to Help Build Your Meals  The SuperTracker can help you plan and track your meals and activity. You can look up individual foods to see or compare their nutritional value. You can get guidelines for what and how much you should eat. You can compare your food choices. And you can assess personal physical activities and see ways you can improve. Go to www.[employer].gov/supertracker/.    3641-6798 [employer]. [address] [place] [address]. All rights reserved. This information is not intended as a substitute for professional medical care. Always follow your healthcare professional's instructions.           Patient Education   Instrumental Activities of Daily Living  Your Health Risk Assessment indicates you have difficulties with instrumental activities of daily living which include laundry, housekeeping, banking, shopping, using the telephone, food preparation, transportation, or taking your own medications. Please make a follow up appointment for us to address this issue in more detail.    [facility]  has resources available on the following website: [email or web address]     Also, here is a local agency that provides help with meals and other assistance:   [facility] Line: 444.974.5675     Patient Education   Signs of Hearing Loss  Hearing loss is a problem shared by many people. In fact, it is one of the most common health conditions, particularly as people age. Most people over age 65 have some hearing loss, and by age 80, almost everyone does. Because hearing loss usually occurs slowly over the years, you may not realize your hearing ability has gotten worse.       Have your hearing checked  Contact your [facility] care provider if you:    Have to strain to hear normal conversation.    Have to watch other peoples faces very carefully to follow what theyre saying.    Need to ask people to repeat what theyve said.    Often misunderstand what people are saying.    Turn the volume of the television or radio up so high that others complain.    Feel that people are mumbling when theyre talking to you.    Find that the effort to hear leaves you feeling tired and irritated.    Notice, when using the phone, that you hear better with 1 ear than the other.    1697-8856 [employer]. [address] [place] [address], [place] [address]. All rights reserved. This information is not intended as a substitute for professional medical care. Always follow your healthcare professional's instructions.         Patient Education   Urinary Incontinence (Male)    Urinary incontinence means not being able to control the release of urine from the bladder.  Causes  Common causes of urinary incontinence in men include:    Infection    Certain medicines    Aging    Poor pelvic muscle tone    Bladder spasms    Obesity    Urinary retention  Nervous system diseases, diabetes, sleep apnea, urinary tract infections, prostate surgery, and pelvic trauma can also cause incontinence. Constipation and smoking have also been  identified as risk factors.  Symptoms    Urge incontinence (also called overactive bladder) is a sudden urge to urinate even though there may not be much urine in the bladder. The need to urinate often during the night is common. It is due to bladder spasms.    Stress incontinence is involuntary urine leakage that can occur with sneezing, coughing, and other actions that put stress on the bladder.  Treatment  Treatment of urinary incontinence depends on the cause. Infections of the bladder are treated with antibiotics. Urinary retention is treated with a bladder catheter.  Home care  Follow these guidelines when caring for yourself at home:    Don't consume foods and drinks that may irritate the bladder. These include drinks containing alcohol, caffeinate, or carbonation; chocolate; and acidic fruits and juices.    Limit fluid intake to 6 to 8 cups a day.    Lose weight if you are overweight. This will reduce your symptoms.    If needed, wear absorbent pads to catch urine. Change pads frequently to maintain hygiene and prevent skin and bladder infections.    Bathe daily to maintain good hygiene.    If an antibiotic was prescribed to treat a bladder infection, be sure to take it until finished, even if you are feeling better before then. This is to make sure your infection has cleared.    If a catheter was left in place, it is important to keep bacteria from getting into the collection bag. Don't disconnect the catheter from the collection bag.    Use a leg band to secure the catheter drainage tube, so it does not pull on the catheter. Drain the collection bag when it becomes full using the drain spout at the bottom of the bag. Don't disconnect the bag from the catheter.    Don't pull on or try to remove a catheter. The catheter must be removed by a healthcare provider.  Follow-up care  Follow up with your healthcare provider, or as advised.  When to seek medical advice  Call your healthcare provider right away if  any of these occur:    Fever over 100.4 F (38 C), or as directed by your healthcare provider    Bladder pain or fullness    Abdominal swelling, nausea, or vomiting    Back pain    Weakness, dizziness, or fainting    If a catheter was left in place, return if:  ? Catheter falls out  ? Catheter stops draining for 6 hours  Date Last Reviewed: 10/1/2017    8736-2161 [employer]. [address] [place] [address]. All rights reserved. This information is not intended as a substitute for professional medical care. Always follow your healthcare professional's instructions.     Patient Education   Your Health Risk Assessment indicates you feel you are not in good emotional health.    Recreation   Recreation is not limited to sports and team events. It includes any activity that provides relaxation, interest, enjoyment, and exercise. Recreation provides an outlet for physical, mental, and social energy. It can give a sense of worth and achievement. It can help you stay healthy.    Mental Exercise and Social Involvement  Mental and emotional health is as important as physical health. Keep in touch with friends and family. Stay as active as possible. Continue to learn and challenge yourself.   Things you can do to stay mentally active are:    Learn something new, like a foreign language or musical instrument.     Play SCRABBLE or do crossword puzzles. If you cannot find people to play these games with you at home, you can play them with others on your computer through the Internet.     Join a games club--anything from card games to chess or checkers or lawn bowling.     Start a new hobby.     Go back to school.     Volunteer.     Read.     Keep up with world events.       Patient Education   Depression and Suicide in Older Adults  Nearly 2 million older Americans have some type of depression. Sadly, some of them even take their own lives. Yet depression among older adults is often ignored. Learn the  warning signs. You may help spare a loved one needless pain. You may also save a life.       What Is Depression?  Depression is a mood disorder that affects the way you think and feel. The most common symptom is a feeling of deep sadness. People who are depressed also may seem tired and listless. And nothing seems to give them pleasure. Its normal to grieve or be sad sometimes. But sadness lessens or passes with time. Depression rarely goes away or improves on its own. Other symptoms of depression are:    Sleeping more or less than normal    Eating more or less than normal    Having headaches, stomachaches, or other pains that dont go away    Feeling nervous, empty, or worthless    Crying a great deal    Thinking or talking about suicide or death    Feeling confused or forgetful  What Causes It?  The causes of depression arent fully known. Certain chemicals in the brain play a role. Depression does run in families. And life stresses can also trigger depression in some people. That may be the case with older adults. They often face great burdens, such as the death of friends or a spouse. They may have failing health. And they are more likely to be alone, lonely, or poor.  How You Can Help  Often, depressed people may not want to ask for help. When they do, they may be ignored. Or, they may receive the wrong treatment. You can help by showing parents and older friends love and support. If they seem depressed, help them find the right treatment. Talk to your doctor. Or contact a local mental health center, social service agency, or hospital. With modern treatment, no one has to suffer from depression.  Resources:    National [place] of Mental Health  311.722.4775  www.nimh.nih.gov    National [place] on Mental Illness  841.474.1184  www.marcin.org    [facility]  291.253.2044  www.nmha.org    National Suicide Hotline  399.855.5384 (800-SUICIDE)      9195-0270 [employer]. [address] [place] [address]  Road, [place] [address]. All rights reserved. This information is not intended as a substitute for professional medical care. Always follow your healthcare professional's instructions.         Patient Education   Preventing Falls in the Home  As you get older, falls are more likely. Thats because your reaction time slows. Your muscles and joints may also get stiffer, making them less flexible. Illness, medications, and vision changes can also affect your balance. A fall could leave you unable to live on your own. To make your home safer, follow these tips:    Floors    Put nonskid pads under area rugs.    Remove throw rugs.    Replace worn floor coverings.    Tack carpets firmly to each step on carpeted stairs. Put nonskid strips on the edges of uncarpeted stairs.    Keep floors and stairs free of clutter and cords.    Arrange furniture so there are clear pathways.    Clean up any spills right away.    Bathrooms    Install grab bars in the tub or shower.    Apply nonskid strips or put a nonskid rubber mat in the tub or shower.    Sit on a bath chair to bathe.    Use bathmats with nonskid backing.    Lighting    Keep a flashlight in each room.    Put a nightlight along the pathway between the bedroom and the bathroom.    8443-6861 [employer]. [address] [place] [address]. All rights reserved. This information is not intended as a substitute for professional medical care. Always follow your healthcare professional's instructions.         Patient Education   Understanding Advance Care Planning  Advance care planning is the process of deciding ones own future medical care. It helps ensure that if you cant speak for yourself, your wishes can still be carried out. The plan is a series of legal documents that note a persons wishes. The documents vary by state. Advance care planning may be done when a person has a serious illness that is expected to get worse. It may be done before major surgery.  And it can help you and your family be prepared in case of a major illness or injury. Advance care planning helps with making decisions at these times.       A health care proxy is a person who acts as the voice of a patient when the patient cant speak for himself or herself. The name of this role varies by state. It may be called a Durable Medical Power of  or Durable Power of  for Healthcare. It may be called an agent, surrogate, or advocate. Or it may be called a representative or decision maker. It is an official duty that is identified by a legal document. The document also varies by state.    Why Is Advance Care Planning Important?  If a person communicates their healthcare wishes:    They will be given medical care that matches their values and goals.    Their family members will not be forced to make decisions in a crisis with no guidance.  Creating a Plan  Making an advance care plan is often done in 3 steps:    Thinking about ones wishes. To create an advance care plan, you should think about what kind of medical treatment you would want if you lose the ability to communicate. Are there any situations in which you would refuse or stop treatment? Are there therapies you would want or not want? And whom do you want to make decisions for you? There are many places to learn more about how to plan for your care. Ask your doctor or  for resources.    Picking a health care proxy. This means choosing a trusted person to speak for you only when you cant speak for yourself. When you cannot make medical decisions, your proxy makes sure the instructions in your advance care plan are followed. A proxy does not make decisions based on his or her own opinions. They must put aside those opinions and values if needed, and carry out your wishes.    Filling out the legal documents. There are several kinds of legal documents for advance care planning. Each one tells health care providers your  wishes. The documents may vary by state. They must be signed and may need to be witnessed or notarized. You can cancel or change them whenever you wish. Depending on your state, the documents may include a Healthcare Proxy form, Living Will, Durable Medical Power of , Advance Directive, or others.  The Familys Role  The best help a family can give is to support their loved ones wishes. Open and honest communication is vital. Family should express any concerns they have about the patients choices while the patient can still make decisions.    3840-4783 The [employer]. [address] [place] [address]. All rights reserved. This information is not intended as a substitute for professional medical care. Always follow your healthcare professional's instructions.         Also, [facility] offers a free, downloadable health care directive that allows you to share your treatment choices and personal preferences if you cannot communicate your wishes. It also allows you to appoint another person (called a health care agent) to make health care decisions if you are unable to do so. You can download an advance directive by going here: [email or web address]     Patient Education   Personalized Prevention Plan  You are due for the preventive services outlined below.  Your care team is available to assist you in scheduling these services.  If you have already completed any of these items, please share that information with your care team to update in your medical record.  Health Maintenance Due   Topic Date Due   ? DEPRESSION ACTION PLAN  1940   ? HEPATITIS C SCREENING  1940   ? ZOSTER VACCINES (2 of 3) 02/29/2008   ? LIPID  11/24/2015   ? DIABETIC EYE EXAM  06/25/2019   ? DIABETIC FOOT EXAM  02/13/2020   ? MICROALBUMIN  11/04/2020   ? BMP  01/14/2021

## 2021-09-02 ENCOUNTER — THERAPY VISIT (OUTPATIENT)
Dept: SLEEP MEDICINE | Facility: CLINIC | Age: 81
End: 2021-09-02
Payer: COMMERCIAL

## 2021-09-02 DIAGNOSIS — F51.04 CHRONIC INSOMNIA: ICD-10-CM

## 2021-09-02 DIAGNOSIS — E11.69 TYPE 2 DIABETES MELLITUS WITH OTHER SPECIFIED COMPLICATION, UNSPECIFIED WHETHER LONG TERM INSULIN USE (H): Chronic | ICD-10-CM

## 2021-09-02 DIAGNOSIS — I10 ESSENTIAL HYPERTENSION, BENIGN: Chronic | ICD-10-CM

## 2021-09-02 DIAGNOSIS — I25.10 CORONARY ARTERY DISEASE INVOLVING NATIVE CORONARY ARTERY OF NATIVE HEART, ANGINA PRESENCE UNSPECIFIED: Chronic | ICD-10-CM

## 2021-09-02 DIAGNOSIS — G47.33 OSA (OBSTRUCTIVE SLEEP APNEA): ICD-10-CM

## 2021-09-02 DIAGNOSIS — Z95.2 HISTORY OF AORTIC VALVE REPLACEMENT: Chronic | ICD-10-CM

## 2021-09-02 DIAGNOSIS — F03.90 DEMENTIA WITHOUT BEHAVIORAL DISTURBANCE, UNSPECIFIED DEMENTIA TYPE: Chronic | ICD-10-CM

## 2021-09-02 PROCEDURE — 95810 POLYSOM 6/> YRS 4/> PARAM: CPT | Performed by: INTERNAL MEDICINE

## 2021-09-06 PROBLEM — G47.33 OSA (OBSTRUCTIVE SLEEP APNEA): Chronic | Status: ACTIVE | Noted: 2021-06-15

## 2021-09-06 NOTE — PROCEDURES
" SLEEP STUDY INTERPRETATION  DIAGNOSTIC POLYSOMNOGRAPHY REPORT      Patient: NAA TSE  YOB: 1940  Study Date: 9/2/2021  MRN: [id number]  Referring Provider: Anastasiia Hu MD  Ordering Provider: Florencio Purcell MD    Indications for Polysomnography: The patient is a 81 year old Male who is 5' 10\" and weighs 200.0 lbs. His BMI is 29.0, [place] sleepiness scale 8 and neck circumference is 42 cm. A diagnostic polysomnogram was performed to evaluate for reported history of obstructive sleep apnea, unknown severity, diagnosed by sleep study >10 years ago. No study available for review. Currently untreated with symptoms of sleep maintenance difficulties. Comorbid coronary artery disease, diabetes mellitus, hypertension, dementia, depression.    Polysomnogram Data: A full night polysomnogram recorded the standard physiologic parameters including EEG, EOG, EMG, ECG, nasal and oral airflow. Respiratory parameters of chest and abdominal movements were recorded with respiratory inductance plethysmography. Oxygen saturation was recorded by pulse oximetry. Hypopnea scoring rule used: 1B 4%.    Sleep Architecture:  The total recording time of the polysomnogram was 513.2 minutes. The total sleep time was 391.0 minutes. Sleep latency was increased at 54.4 minutes. Patient stated he did take night time medications, although he was unaware of what medications were taken. Trazodone is listed in patient medications.  REM latency was 74.0 minutes. Arousal index was 26.7 arousals per hour. Sleep efficiency was decreased at 76.2%. Wake after sleep onset was 46.0 minutes. The patient spent 3.5% of total sleep time in Stage N1, 48.8% in Stage N2, 34.5% in Stage N3, and 13.2% in REM. Time in REM supine was 25.5 minutes.    Respiration:     Events ? The polysomnogram revealed a presence of 15 obstructive, 10 central, and 4 mixed apneas resulting in an apnea index of 4.5 events per hour. There were 60 obstructive hypopneas and 0 " central hypopneas resulting in an obstructive hypopnea index of 9.2 and central hypopnea index of 0 events per hour. The combined apnea/hypopnea index was 13.7 events per hour (central apnea/hypopnea index was 1.5 events per hour). The REM AHI was 10.5 events per hour. The supine AHI was 21.2 events per hour. The RERA index was 9.8 events per hour.  The RDI was 23.5 events per hour.    Snoring - was reported as mild to moderate and intermittent.    Respiratory rate and pattern - was notable for normal respiratory rate and pattern.    Sustained Sleep Associated Hypoventilation - Transcutaneous carbon dioxide monitoring was not used, however significant hypoventilation was not suggested by oximetry    Sleep Associated Hypoxemia - (Greater than 5 minutes O2 sat at or below 88%)  Baseline oxygen saturation was 92.9%. Lowest oxygen saturation was 86.1%. Time spent less than or equal to 88% was 1.2 minutes. Time spent less than or equal to 89% was 8.1 minutes.    Movement Activity:     Periodic Limb Activity - There were 103 PLMs during the entire study. The PLM index was 15.8 movements per hour. The PLM Arousal Index was 0.3 per hour.    REM EMG Activity - Excessive transient muscle activity was present in REM and NREM sleep.    Nocturnal Behavior - Abnormal sleep related behaviors were not noted     Bruxism - None apparent.    Cardiac Summary:  The average pulse rate was 55.4 bpm. The minimum pulse rate was 37.3 bpm while the maximum pulse rate was 75.0 bpm.  Arrhythmias were not noted.      Assessment:     Mild obstructive sleep apnea, almost exclusively supime-positiional    Periodic limb movements of sleep    Recommendations:    Based on the presence of mild obstructive sleep apnea and symptoms or comorbidities, treatment could be empirically initiated with Auto?titrating PAP therapy with a range of 5 to 15 cmH2O. Recommend clinical follow up with sleep management team.    Patient may be a candidate for dental  appliance through referral to Sleep Dentistry for the treatment of obstructive sleep apnea and/or socially disruptive snoring.    If devices are not acceptable or effective, patient may benefit from evaluation of possible surgical options. If he is interested, would recommend referral to specialized ENT-Sleep provider.    Advice regarding the risks of drowsy driving.    Suggest optimizing sleep schedule and avoiding sleep deprivation.    Weight management (if BMI > 30).    Pharmacologic therapy should be used for management of restless legs syndrome only if present and clinically indicated and not based on the presence of periodic limb movements alone.    Diagnostic Codes:   Obstructive Sleep Apnea G47.33      _____________________________________   Electronically Signed By: Florencio Purcell MD 9/6/2021           Range(%) Time in range (min)   0.0 - 89.0 8.1   0.0 - 88.0 1.2         Stage Min(mm Hg) Max(mm Hg)   Wake - -   NREM(1+2+3) - -   REM - -       Range(mmHg) Time in range (min)   55.0 - 100.0 -   Excluded data <20.0 & >65.0 513.5

## 2021-09-07 LAB — SLPCOMP: NORMAL

## 2021-09-22 ENCOUNTER — VIRTUAL VISIT (OUTPATIENT)
Dept: SLEEP MEDICINE | Facility: CLINIC | Age: 81
End: 2021-09-22
Payer: COMMERCIAL

## 2021-09-22 DIAGNOSIS — G47.33 OSA (OBSTRUCTIVE SLEEP APNEA): Primary | ICD-10-CM

## 2021-09-22 DIAGNOSIS — F51.04 CHRONIC INSOMNIA: ICD-10-CM

## 2021-09-22 PROCEDURE — 99214 OFFICE O/P EST MOD 30 MIN: CPT | Mod: GT | Performed by: INTERNAL MEDICINE

## 2021-09-22 NOTE — PATIENT INSTRUCTIONS
Positioning Device ze, slumber bump, tennis ball tshirt.   This example shows a pillow that straps around the waist. It may be appropriate for those whose sleep study shows milder sleep apnea that occurs primarily when lying flat on one's back. Preliminary studies have shown benefit but effectiveness at home should be verified.

## 2021-09-22 NOTE — PROGRESS NOTES
Blanco is a 81 year old who is being evaluated via a billable video visit.      How would you like to obtain your AVS?   If the video visit is dropped, the invitation should be resent by:   Will anyone else be joining your video visit?       Video Start Time: 10:37 AM      Video-Visit Details    Type of service:  Video Visit    Video End Time:11:07 AM    Originating Location (pt. Location): Home    Distant Location (provider location):  [facility]     Platform used for Video Visit: [employer] mobile        Sleep Study Follow-Up Visit:    Date on this visit: 9/22/2021    Nestor Peterson for follow-up of his sleep study done at the [facility] for a reported history of obstructive sleep apnea, unknown severity, diagnosed by sleep study >10 years ago. No study available for review. Untreated with symptoms of sleep maintenance difficulties. Comorbid coronary artery disease, diabetes mellitus, hypertension, dementia, depression.   - Sleep onset, maintenance difficulties     Study Date: 9/2/2021 (200.0 lbs)     Sleep Architecture:  The total recording time of the polysomnogram was 513.2 minutes. The total sleep time was 391.0 minutes. Sleep latency was increased at 54.4 minutes. Patient stated he did take night time medications, although he was unaware of what medications were taken. Trazodone is listed in patient medications.  REM latency was 74.0 minutes. Arousal index was 26.7 arousals per hour. Sleep efficiency was decreased at 76.2%. Wake after sleep onset was 46.0 minutes. The patient spent 3.5% of total sleep time in Stage N1, 48.8% in Stage N2, 34.5% in Stage N3, and 13.2% in REM. Time in REM supine was 25.5 minutes.     Respiration:     Events ? The polysomnogram revealed a presence of 15 obstructive, 10 central, and 4 mixed apneas resulting in an apnea index of 4.5 events per hour. There were 60 obstructive hypopneas and 0 central hypopneas resulting in an obstructive hypopnea  index of 9.2 and central hypopnea index of 0 events per hour. The combined apnea/hypopnea index was 13.7 events per hour (central apnea/hypopnea index was 1.5 events per hour). The REM AHI was 10.5 events per hour. The supine AHI was 21.2 events per hour. The RERA index was 9.8 events per hour.  The RDI was 23.5 events per hour.    Snoring - was reported as mild to moderate and intermittent.    Respiratory rate and pattern - was notable for normal respiratory rate and pattern.    Sustained Sleep Associated Hypoventilation - Transcutaneous carbon dioxide monitoring was not used, however significant hypoventilation was not suggested by oximetry    Sleep Associated Hypoxemia - (Greater than 5 minutes O2 sat at or below 88%)  Baseline oxygen saturation was 92.9%. Lowest oxygen saturation was 86.1%. Time spent less than or equal to 88% was 1.2 minutes. Time spent less than or equal to 89% was 8.1 minutes.     Movement Activity:     Periodic Limb Activity - There were 103 PLMs during the entire study. The PLM index was 15.8 movements per hour. The PLM Arousal Index was 0.3 per hour.    REM EMG Activity - Excessive transient muscle activity was present in REM and NREM sleep.    Nocturnal Behavior - Abnormal sleep related behaviors were not noted     Bruxism - None apparent.     Cardiac Summary:  The average pulse rate was 55.4 bpm. The minimum pulse rate was 37.3 bpm while the maximum pulse rate was 75.0 bpm.  Arrhythmias were not noted.     Machine is 10 years old from [place]    These findings were reviewed with patient and son LEIF.     Past medical/surgical history, family history, social history, medications and allergies were reviewed.      Problem List:  Patient Active Problem List    Diagnosis Date Noted     Dementia (H) 06/19/2017     Priority: High     History of aortic valve replacement      Priority: High     Bicuspid mild-mod AS on echo 4/08  Status post LIMA to LAD bypass and bioprosthetic aortic valve  replacement in 07/2014        Lewy body disease (H) 01/18/2016     Priority: High     Coronary artery disease 05/14/2015     Priority: High     Status post LIMA to LAD bypass and bioprosthetic aortic valve replacement in 07/2014        ZAKIA (obstructive sleep apnea)- mild (AHI 13) 06/15/2021     Priority: Medium     9/2/2021 [facility] (200.0 lbs) - AHI 13.7, RDI 23.5, Supine AHI 21.2, REM AHI 10.5, Low O2 86.1%, Time Spent ?88% 1.2 minutes / Time Spent ?89% 8.1 minutes. PLM index was 15.8 movements per hour. The PLM Arousal Index was 0.3 per hour.       Hyperlipidemia      Priority: Medium     Type 2 diabetes mellitus (H)      Priority: Medium     Chronic Kidney Disease, Stage 3      Priority: Medium     Depression      Priority: Medium     Benign Essential Hypertension      Priority: Medium     Sensorineural hearing loss (SNHL) of both ears 06/15/2021     Priority: Low     Seasonal allergic rhinitis due to pollen      Priority: Low     Benign prostatic hyperplasia with post-void dribbling      Priority: Low     Primary osteoarthritis involving multiple joints 07/16/2018     Priority: Low     Chronic insomnia      Priority: Low     Constipation      Priority: Low     Localized Osteoarthritis Of The Knee      Priority: Low     Vitamin D deficiency      Priority: Low     Lumbar Disc Degeneration      Priority: Low     Actinic keratosis      Priority: Low     Male Erectile Disorder      Priority: Low     Left Ventricular Hypertrophy      Priority: Low     Abnormal Electrocardiogram      Priority: Low     Intermittent Claudication      Priority: Low     Benign Adenomatous Polyp Of The Large Intestine      Priority: Low     Lumbar Radiculopathy      Priority: Low        Impression/Plan:    Mild obstructive sleep apnea, almost exclusively supime-positional  Treatment options reviewed  They are interested in a re-trial of CPAP or mandibular advancement device or positional therapy  - Will have patient  bring machine in to [facility] so we can assess settings and capability on a day I am in clinic, would consider referral to DME for re-set-up education and then STM therapy vs starting with new deivce and STM       I spent 30 minutes with patient including counseling, and 5 minutes with chart review, and documentation

## 2021-10-08 ENCOUNTER — TELEPHONE (OUTPATIENT)
Dept: SLEEP MEDICINE | Facility: CLINIC | Age: 81
End: 2021-10-08

## 2021-10-08 NOTE — TELEPHONE ENCOUNTER
Reason for call:  Other   Patient called regarding (reason for call): call back  Additional comments: Patient elo Luevano is calling in to schedule an appointment to get patient CPAP adjust it,     Phone number to reach patient:  Other phone number:  [phone] Elo Luevano     Best Time:  Anytime    Can we leave a detailed message on this number?  YES    Travel screening: Not Applicable

## 2021-10-12 ENCOUNTER — MYC MEDICAL ADVICE (OUTPATIENT)
Dept: SLEEP MEDICINE | Facility: CLINIC | Age: 81
End: 2021-10-12

## 2021-10-12 NOTE — TELEPHONE ENCOUNTER
Left voice message for patient to call  Sleep clinic directly to verify Dr. Purcell is in clinic when he brings his device in.   [employer] message sent with details.   Leann Navarrete MA

## 2021-10-16 ENCOUNTER — HEALTH MAINTENANCE LETTER (OUTPATIENT)
Age: 81
End: 2021-10-16

## 2021-10-28 ENCOUNTER — TRANSFERRED RECORDS (OUTPATIENT)
Dept: HEALTH INFORMATION MANAGEMENT | Facility: CLINIC | Age: 81
End: 2021-10-28
Payer: COMMERCIAL

## 2021-11-01 DIAGNOSIS — G47.00 INSOMNIA: ICD-10-CM

## 2021-11-02 RX ORDER — TRAZODONE HYDROCHLORIDE 100 MG/1
TABLET ORAL
Qty: 90 TABLET | Refills: 1 | Status: SHIPPED | OUTPATIENT
Start: 2021-11-02 | End: 2022-07-02

## 2021-11-02 NOTE — TELEPHONE ENCOUNTER
"Routing refill request to provider for review/approval because:  Drug interaction warning    Last Written Prescription Date:  11/2/20  Last Fill Quantity: 90,  # refills: 3   Last office visit provider:  3/8/21     Requested Prescriptions   Pending Prescriptions Disp Refills     traZODone (DESYREL) 100 MG tablet [Pharmacy Med Name: TRAZODONE 100 MG TABLET] 90 tablet 3     Sig: TAKE 1 TABLET BY MOUTH EVERYDAY AT BEDTIME       Serotonin Modulators Passed - 11/1/2021  9:30 AM        Passed - Recent (12 mo) or future (30 days) visit within the authorizing provider's specialty     Patient has had an office visit with the authorizing provider or a provider within the authorizing providers department within the previous 12 mos or has a future within next 30 days. See \"Patient Info\" tab in inbasket, or \"Choose Columns\" in Meds & Orders section of the refill encounter.              Passed - Medication is active on med list        Passed - Patient is age 18 or older             Sid Christina RN 11/02/21 11:10 AM  "

## 2021-11-10 ENCOUNTER — APPOINTMENT (OUTPATIENT)
Dept: SLEEP MEDICINE | Facility: CLINIC | Age: 81
End: 2021-11-10
Payer: COMMERCIAL

## 2021-11-10 ENCOUNTER — TELEPHONE (OUTPATIENT)
Dept: SLEEP MEDICINE | Facility: CLINIC | Age: 81
End: 2021-11-10

## 2021-11-10 DIAGNOSIS — G47.33 OSA (OBSTRUCTIVE SLEEP APNEA): Primary | ICD-10-CM

## 2021-11-10 NOTE — TELEPHONE ENCOUNTER
Patient and son came to clinic with old CPAP machine which was set at 10cm, with EPR 3 and ramp of 30 minutes    Treatment options reviewed again  - Changed to autoPAP 4-12 cmH20  - Recommend trial of 30 minutes 1-2 times a day  - New set-up consult with DME  - Referral to [facility]  - Follow-up with me in 2-3 months

## 2021-11-11 DIAGNOSIS — N39.43 BENIGN PROSTATIC HYPERPLASIA WITH POST-VOID DRIBBLING: ICD-10-CM

## 2021-11-11 DIAGNOSIS — Z76.0 ENCOUNTER FOR MEDICATION REFILL: Primary | ICD-10-CM

## 2021-11-11 DIAGNOSIS — N40.1 BENIGN PROSTATIC HYPERPLASIA WITH POST-VOID DRIBBLING: ICD-10-CM

## 2021-11-11 RX ORDER — FINASTERIDE 5 MG/1
TABLET, FILM COATED ORAL
Qty: 90 TABLET | Refills: 3 | Status: SHIPPED | OUTPATIENT
Start: 2021-11-11 | End: 2022-12-18

## 2021-11-16 ENCOUNTER — DOCUMENTATION ONLY (OUTPATIENT)
Dept: SLEEP MEDICINE | Facility: CLINIC | Age: 81
End: 2021-11-16
Payer: COMMERCIAL

## 2021-11-16 DIAGNOSIS — F51.04 CHRONIC INSOMNIA: ICD-10-CM

## 2021-11-16 DIAGNOSIS — G47.33 OSA (OBSTRUCTIVE SLEEP APNEA): ICD-10-CM

## 2021-11-16 NOTE — PROGRESS NOTES
STM recheck per provider     Diagnostic AHI: 13.7    PSG    Data only recheck     Assessment:   No data available      Action plan: follow up per provider request      Device type: Auto-CPAP

## 2021-11-26 ENCOUNTER — IMMUNIZATION (OUTPATIENT)
Dept: NURSING | Facility: CLINIC | Age: 81
End: 2021-11-26
Payer: COMMERCIAL

## 2021-11-26 PROCEDURE — 0004A PR COVID VAC PFIZER DIL RECON 30 MCG/0.3 ML IM: CPT

## 2021-11-26 PROCEDURE — 91300 PR COVID VAC PFIZER DIL RECON 30 MCG/0.3 ML IM: CPT

## 2022-01-18 DIAGNOSIS — F33.1 MODERATE EPISODE OF RECURRENT MAJOR DEPRESSIVE DISORDER (H): ICD-10-CM

## 2022-01-19 RX ORDER — FLUOXETINE 10 MG/1
CAPSULE ORAL
Qty: 90 CAPSULE | Refills: 3 | Status: SHIPPED | OUTPATIENT
Start: 2022-01-19 | End: 2023-02-21

## 2022-02-05 DIAGNOSIS — F02.80 LEWY BODY DEMENTIA (H): ICD-10-CM

## 2022-02-05 DIAGNOSIS — Z76.0 ENCOUNTER FOR MEDICATION REFILL: Primary | ICD-10-CM

## 2022-02-05 DIAGNOSIS — E78.2 MIXED HYPERLIPIDEMIA: Primary | ICD-10-CM

## 2022-02-05 DIAGNOSIS — G31.83 LEWY BODY DEMENTIA (H): ICD-10-CM

## 2022-02-05 DIAGNOSIS — Z95.2 HEART VALVE REPLACED: ICD-10-CM

## 2022-02-05 DIAGNOSIS — N40.0 BPH (BENIGN PROSTATIC HYPERPLASIA): ICD-10-CM

## 2022-02-07 ENCOUNTER — OFFICE VISIT (OUTPATIENT)
Dept: FAMILY MEDICINE | Facility: CLINIC | Age: 82
End: 2022-02-07
Payer: COMMERCIAL

## 2022-02-07 VITALS
SYSTOLIC BLOOD PRESSURE: 126 MMHG | RESPIRATION RATE: 18 BRPM | OXYGEN SATURATION: 92 % | DIASTOLIC BLOOD PRESSURE: 62 MMHG | TEMPERATURE: 98.4 F | HEART RATE: 64 BPM | BODY MASS INDEX: 29.13 KG/M2 | WEIGHT: 203 LBS

## 2022-02-07 DIAGNOSIS — M65.30 TRIGGER FINGER, ACQUIRED: ICD-10-CM

## 2022-02-07 DIAGNOSIS — H90.3 SENSORINEURAL HEARING LOSS (SNHL) OF BOTH EARS: ICD-10-CM

## 2022-02-07 DIAGNOSIS — E11.69 TYPE 2 DIABETES MELLITUS WITH OTHER SPECIFIED COMPLICATION, WITHOUT LONG-TERM CURRENT USE OF INSULIN (H): Primary | ICD-10-CM

## 2022-02-07 DIAGNOSIS — N18.30 STAGE 3 CHRONIC KIDNEY DISEASE, UNSPECIFIED WHETHER STAGE 3A OR 3B CKD (H): ICD-10-CM

## 2022-02-07 DIAGNOSIS — M79.661 PAIN OF RIGHT LOWER LEG: ICD-10-CM

## 2022-02-07 DIAGNOSIS — F03.90 DEMENTIA WITHOUT BEHAVIORAL DISTURBANCE, UNSPECIFIED DEMENTIA TYPE: ICD-10-CM

## 2022-02-07 LAB
ANION GAP SERPL CALCULATED.3IONS-SCNC: 12 MMOL/L (ref 5–18)
BUN SERPL-MCNC: 21 MG/DL (ref 8–28)
CALCIUM SERPL-MCNC: 9.5 MG/DL (ref 8.5–10.5)
CHLORIDE BLD-SCNC: 105 MMOL/L (ref 98–107)
CO2 SERPL-SCNC: 23 MMOL/L (ref 22–31)
CREAT SERPL-MCNC: 1.4 MG/DL (ref 0.7–1.3)
GFR SERPL CREATININE-BSD FRML MDRD: 51 ML/MIN/1.73M2
GLUCOSE BLD-MCNC: 98 MG/DL (ref 70–125)
HBA1C MFR BLD: 7.1 % (ref 0–5.6)
POTASSIUM BLD-SCNC: 4.8 MMOL/L (ref 3.5–5)
SODIUM SERPL-SCNC: 140 MMOL/L (ref 136–145)

## 2022-02-07 PROCEDURE — 80048 BASIC METABOLIC PNL TOTAL CA: CPT | Performed by: FAMILY MEDICINE

## 2022-02-07 PROCEDURE — 99214 OFFICE O/P EST MOD 30 MIN: CPT | Performed by: FAMILY MEDICINE

## 2022-02-07 PROCEDURE — 36415 COLL VENOUS BLD VENIPUNCTURE: CPT | Performed by: FAMILY MEDICINE

## 2022-02-07 PROCEDURE — 83036 HEMOGLOBIN GLYCOSYLATED A1C: CPT | Performed by: FAMILY MEDICINE

## 2022-02-07 RX ORDER — METOPROLOL SUCCINATE 25 MG/1
TABLET, EXTENDED RELEASE ORAL
Qty: 90 TABLET | Refills: 0 | Status: SHIPPED | OUTPATIENT
Start: 2022-02-07 | End: 2022-04-06

## 2022-02-07 RX ORDER — TAMSULOSIN HYDROCHLORIDE 0.4 MG/1
CAPSULE ORAL
Qty: 90 CAPSULE | Refills: 2 | Status: SHIPPED | OUTPATIENT
Start: 2022-02-07 | End: 2023-02-21

## 2022-02-07 RX ORDER — DONEPEZIL HYDROCHLORIDE 5 MG/1
TABLET, FILM COATED ORAL
Qty: 90 TABLET | Refills: 3 | Status: SHIPPED | OUTPATIENT
Start: 2022-02-07 | End: 2023-04-16

## 2022-02-08 NOTE — PROGRESS NOTES
ASSESMENT AND PLAN:  Diagnoses and all orders for this visit:  Type 2 diabetes mellitus with other specified complication, without long-term current use of insulin (H)  -     Hemoglobin A1c done today shows good control.  Recheck again in 6 months.  -     Basic metabolic panel  (Ca, Cl, CO2, Creat, Gluc, K, Na, BUN); Future  Dementia without behavioral disturbance, unspecified dementia type (H)  Stable.  Counseling done today with the patient and his son about strategies for safe driving.  I recommended the following: No nighttime or poor weather driving.  No freeway or highway driving.  Short distance driving to familiar destinations only.  Monthly monitoring ride along visits with a family member and if there are concerns then driving needs to be discontinued.  Trigger finger, acquired  Observation.  If it worsens then will follow up with orthopedics for procedure.  Pain of right lower leg  Likely musculoskeletal.  Warm packs or cold packs as needed.  Follow-up if not improving.  Sensorineural hearing loss (SNHL) of both ears  No cerumen impaction on exam today.  Follow-up with audiology to see if the hearing aids need to be adjusted.  Stage 3 chronic kidney disease, unspecified whether stage 3a or 3b CKD (H)  Stable.  Due for labs.  -     Basic metabolic panel  (Ca, Cl, CO2, Creat, Gluc, K, Na, BUN); Future      Reviewed the risks and benefits of the treatment plan with the patient and/or caregiver and we discussed indications for routine and emergent follow-up.        SUBJECTIVE: 81-year-old male who lives alone now independently.  His son lives close by and checks in on him frequently.  His wife passed away last year.  Patient does have low-grade dementia, the patient and his family have not noticed progression over this past 6 months and his family feels like he is doing very well living independently.  There have not been any safety concerns.  The patient does still do limited driving.  Patient reports a  intermittent clicking sensation with pain and fixed flexion of his finger in the right hand.  He also reports some pain over the lateral aspect of the upper calf on the right side.  No known injury.  This improved some with massage and has improved some over the last couple of weeks.  Patient reports that his hearing aids do not seem to be working as well as usual and he is wondering if he has a wax impaction which she has experienced in the past.    Past Medical History:   Diagnosis Date     Aortic stenosis      BPH (benign prostatic hyperplasia)      Chronic back pain      Chronic kidney disease      Degenerative joint disease      Dementia (H)      Depression      Diabetes mellitus (H)     type 2     Esophageal reflux      Fatigue      Hyperlipidemia      Hypertension      Left ventricular hypertrophy      Lumbar radiculopathy      Male erectile disorder      Neurocognitive disorder 7/20/2014     Obesity      ZAKIA on CPAP     machine broke at present     Short-term memory loss      Patient Active Problem List   Diagnosis     Male Erectile Disorder     Left Ventricular Hypertrophy     Constipation     Benign prostatic hyperplasia with post-void dribbling     Seasonal allergic rhinitis due to pollen     Abnormal Electrocardiogram     History of aortic valve replacement     Type 2 diabetes mellitus (H)     Chronic Kidney Disease, Stage 3     Depression     Intermittent Claudication     Benign Adenomatous Polyp Of The Large Intestine     Lumbar Radiculopathy     Benign Essential Hypertension     Chronic insomnia     Hyperlipidemia     Localized Osteoarthritis Of The Knee     Lumbar Disc Degeneration     Vitamin D deficiency     Sensorineural hearing loss (SNHL) of both ears     ZAKIA (obstructive sleep apnea)- mild (AHI 13)     Actinic keratosis     Coronary artery disease     Lewy body disease (H)     Dementia (H)     Primary osteoarthritis involving multiple joints     Current Outpatient Medications   Medication Sig  Dispense Refill     ACCU-CHEK DAISHA PLUS TEST STRP strips [ACCU-CHEK DAISHA PLUS TEST STRP STRIPS] USE TO TEST BLOOD SUGAR ONCE A DAY AS DIRECTED 100 strip 3     aspirin 81 MG tablet [ASPIRIN 81 MG TABLET] Take 81 mg by mouth daily.       finasteride (PROSCAR) 5 MG tablet TAKE 1 TABLET BY MOUTH EVERY DAY 90 tablet 3     FLUoxetine (PROZAC) 10 MG capsule TAKE 1 CAPSULE BY MOUTH EVERY DAY 90 capsule 3     FOLIC ACID/MULTIVIT-MIN/LUTEIN (CENTRUM SILVER ORAL) [FOLIC ACID/MULTIVIT-MIN/LUTEIN (CENTRUM SILVER ORAL)] Take 1 tablet by mouth daily.       glipiZIDE (GLUCOTROL XL) 2.5 MG 24 hr tablet [GLIPIZIDE (GLUCOTROL XL) 2.5 MG 24 HR TABLET] TAKE 1 TABLET (2.5 MG TOTAL) BY MOUTH DAILY. 90 tablet 3     Lancets MISC [LANCETS MISC] Use As Directed.       metFORMIN (GLUCOPHAGE) 1000 MG tablet [METFORMIN (GLUCOPHAGE) 1000 MG TABLET] TAKE 1 TABLET BY MOUTH TWICE A DAY WITH MEALS 180 tablet 3     metoprolol succinate ER (TOPROL-XL) 25 MG 24 hr tablet TAKE 1 TABLET BY MOUTH EVERY DAY 90 tablet 0     pregabalin (LYRICA) 50 MG capsule TAKE 1 CAPSULE BY MOUTH AT BEDTIME. 90 capsule 3     traZODone (DESYREL) 100 MG tablet TAKE 1 TABLET BY MOUTH EVERYDAY AT BEDTIME 90 tablet 1     white petrolatum-mineral oil (ARTIFICIAL TEARS, CARISSA/MIN,) 83-15 % Oint [WHITE PETROLATUM-MINERAL OIL (ARTIFICIAL TEARS, CARISSA/MIN,) 83-15 % OINT] Use 1cm ribbon on lower lids nightly and in the am. 3.5 g 12     donepezil (ARICEPT) 5 MG tablet TAKE 1 TABLET BY MOUTH EVERYDAY AT BEDTIME 90 tablet 3     tamsulosin (FLOMAX) 0.4 MG capsule TAKE 1 CAPSULE BY MOUTH EVERY DAY 90 capsule 2     History   Smoking Status     Never Smoker   Smokeless Tobacco     Never Used       OBJECTICE: /62   Pulse 64   Temp 98.4  F (36.9  C)   Resp 18   Wt 92.1 kg (203 lb)   SpO2 92%   BMI 29.13 kg/m       Recent Results (from the past 24 hour(s))   Hemoglobin A1c    Collection Time: 02/07/22  5:24 PM   Result Value Ref Range    Hemoglobin A1C 7.1 (H) 0.0 - 5.6 %   Basic  metabolic panel  (Ca, Cl, CO2, Creat, Gluc, K, Na, BUN)    Collection Time: 02/07/22  5:24 PM   Result Value Ref Range    Sodium 140 136 - 145 mmol/L    Potassium 4.8 3.5 - 5.0 mmol/L    Chloride 105 98 - 107 mmol/L    Carbon Dioxide (CO2) 23 22 - 31 mmol/L    Anion Gap 12 5 - 18 mmol/L    Urea Nitrogen 21 8 - 28 mg/dL    Creatinine 1.40 (H) 0.70 - 1.30 mg/dL    Calcium 9.5 8.5 - 10.5 mg/dL    Glucose 98 70 - 125 mg/dL    GFR Estimate 51 (L) >60 mL/min/1.73m2        GEN-alert, appropriate, in no apparent distress   HEENT-both external auditory canals are open with visualization of the tympanic membranes which appear normal, moderate wax on both sides.   CV-regular rate and rhythm   RESP-lungs clear to auscultation   Musculoskeletal-calves are nontender to palpation.   Foot exam-normal.  Palpable pedal pulses bilaterally.  No ulcers.     Francisco Javier Rojas MD

## 2022-03-08 DIAGNOSIS — Z76.0 ENCOUNTER FOR MEDICATION REFILL: Primary | ICD-10-CM

## 2022-03-08 DIAGNOSIS — E11.9 DM2 (DIABETES MELLITUS, TYPE 2) (H): ICD-10-CM

## 2022-03-08 NOTE — TELEPHONE ENCOUNTER
Medication Question or Refill    Who is calling: [facility]    What medication are you calling about (include dose and sig)?: METFORMIN  HCL 1000 mg tab take one tab twice daily with meals     Who prescribed the medication?: PCP    Do you need a refill? Yes: script     When did you use the medication last? unknown    Patient offered an appointment? No just seen on 22    Do you have any questions or concerns? No    Okay to leave a detailed message?: No     Call taken on 3/8/22 at 915 am by Mahogany Rivas CMA, CMT

## 2022-05-04 ENCOUNTER — OFFICE VISIT (OUTPATIENT)
Dept: OPTOMETRY | Facility: CLINIC | Age: 82
End: 2022-05-04
Payer: COMMERCIAL

## 2022-05-04 DIAGNOSIS — H04.201 RIGHT EPIPHORA: ICD-10-CM

## 2022-05-04 DIAGNOSIS — H04.123 DRY EYE SYNDROME OF BOTH EYES: ICD-10-CM

## 2022-05-04 DIAGNOSIS — H52.4 PRESBYOPIA: ICD-10-CM

## 2022-05-04 DIAGNOSIS — Z96.1 PSEUDOPHAKIA OF BOTH EYES: ICD-10-CM

## 2022-05-04 DIAGNOSIS — E11.69 TYPE 2 DIABETES MELLITUS WITH OTHER SPECIFIED COMPLICATION, WITHOUT LONG-TERM CURRENT USE OF INSULIN (H): Primary | ICD-10-CM

## 2022-05-04 PROCEDURE — 92004 COMPRE OPH EXAM NEW PT 1/>: CPT | Performed by: OPTOMETRIST

## 2022-05-04 PROCEDURE — 92015 DETERMINE REFRACTIVE STATE: CPT | Performed by: OPTOMETRIST

## 2022-05-04 ASSESSMENT — VISUAL ACUITY
CORRECTION_TYPE: GLASSES
METHOD: SNELLEN - LINEAR
OD_SC: 20/25
OS_SC: 20/20
OS_CC: 20/25-1
OS_SC+: -2
OD_CC: 20/30-1
OD_SC+: -1

## 2022-05-04 ASSESSMENT — CONF VISUAL FIELD
OD_NORMAL: 1
OS_NORMAL: 1

## 2022-05-04 ASSESSMENT — REFRACTION_WEARINGRX
SPECS_TYPE: OTC READERS
OS_SPHERE: +2.50
OD_SPHERE: +2.50

## 2022-05-04 ASSESSMENT — KERATOMETRY
OS_AXISANGLE_DEGREES: 085
OD_K2POWER_DIOPTERS: 44.75
OD_AXISANGLE2_DEGREES: 007
OS_K1POWER_DIOPTERS: 42.50
OD_K1POWER_DIOPTERS: 43.75
OS_AXISANGLE2_DEGREES: 175
OD_AXISANGLE_DEGREES: 097
METHOD_AUTO_MANUAL: AUTOMATED
OS_K2POWER_DIOPTERS: 46.00

## 2022-05-04 ASSESSMENT — SLIT LAMP EXAM - LIDS: COMMENTS: DERMATOCHALASIS

## 2022-05-04 ASSESSMENT — REFRACTION_MANIFEST
OS_ADD: +2.50
OD_SPHERE: PLANO
OD_ADD: +2.50
OS_SPHERE: PLANO

## 2022-05-04 ASSESSMENT — TONOMETRY
OD_IOP_MMHG: 18
IOP_METHOD: TONOPEN
OS_IOP_MMHG: 18

## 2022-05-04 ASSESSMENT — EXTERNAL EXAM - RIGHT EYE: OD_EXAM: NORMAL

## 2022-05-04 ASSESSMENT — CUP TO DISC RATIO
OS_RATIO: 0.3
OD_RATIO: 0.3

## 2022-05-04 ASSESSMENT — EXTERNAL EXAM - LEFT EYE: OS_EXAM: NORMAL

## 2022-05-04 NOTE — PATIENT INSTRUCTIONS
There are not any signs of the diabetes affecting the eyes today.  It is important that you get your eyes dilated once yearly and keep good control of your diabetes.    Systane Complete 1 drop both eyes 2-4 x day.    Heat to the eyes daily for 10-15 minutes nightly with warm washcloth or reusable gel masks from the pharmacy or  Joya heat masks can be purchased at Amazon.    If right eye continues to tear and is bothersome then Referral to Dr. Dominic Beach at the [facility] for evaluation- [phone] for evaluation.    Eyeglass prescription given.  Ok to use OTC readers- +2.50, could try +1.25-+1.50 for puzzle making.    Return in 1 year for a complete eye exam or sooner if needed.    Wild Boogie, OD    The affects of the dilating drops last for 4- 6 hours.  You will be more sensitive to light and vision will be blurry up close.  Do not drive if you do not feel comfortable.  Mydriatic sunglasses were given if needed.    Patient Education   Diabetes weakens the blood vessels all over the body, including the eyes. Damage to the blood vessels in the eyes can cause swelling or bleeding into part of the eye (called the retina). This is called diabetic retinopathy (JENNIFER-tin-AH-puh-thee). If not treated, this disease can cause vision loss or blindness.   Symptoms may include blurred or distorted vision, but many people have no symptoms. It's important to see your eye doctor regularly to check for problems.   Early treatment and good control can help protect your vision. Here are the things you can do to help prevent vision loss:      1. Keep your blood sugar levels under tight control.      2. Bring high blood pressure under control.      3. No smoking.      4. Have yearly dilated eye exams.         Optometry Providers       Clinic Locations                                 Telephone Number   Dr. Eveline Melo  [facility] [place] [phone]     [facility] Hours:                [facility] Hours:       [facility] Hours:   [address]   [address]     [address]  [place] [address]   [place] [address]    [place] [address]  Phone: [phone]                    Phone: [phone]     Phone: [phone]                      Monday 8:00-6:00                          Monday 8:00-6:00                          Monday 8:00-6:00              Tuesday 8:00-6:00                          Tuesday 8:00-6:00                          Tuesday 8:00-6:00              Wednesday 8:00-6:00                  Wednesday 8:00-6:00                   Wednesday 8:00-6:00      Thursday 8:00-6:00                        Thursday 8:00-6:00                         Thursday 8:00-6:00            Friday 8:00-5:00                              Friday 8:00-5:00                              Friday 8:00-5:00    [facility] Hours:   [address] [place] [address]  [phone]    Monday 9:00-6:00  Tuesday 9:00-6:00  Wednesday 9:00-6:00  Thursday 9:00-6:00  Friday 9:00-5:00  Please log on to [email or web address] to order your contact lenses.  The link is found on the [facility] Care [facility] page.  As always, Thank you for trusting us with your health care needs!    There is a combination of three treatments which can greatly improve symptoms of dry eyes.     Artificial tears  Heat (eyes closed)  Eyelid and eyelash cleansing (eyes closed)     Use one drop of artificial tears both eyes 4 x daily.  Once in the morning, lunch, dinner and bedtime. Continue to use the drops regardless if your eyes are comfortable or not.  Artificial tears work best as a preventative and not as well after your eyes are starting to bother you.  It may take 4- 6 weeks of using the drops before you notice improvement.  If after that time you are still having problems schedule an appointment for an evaluation  and discussion of different treatments such as Restasis or Xiidra.  Dry eyes are a chronic condition and you may have more symptoms at certain times of the year.    Excess tearing can be due to the right tears not working properly or a blockage in the tear drainage system.  You can try using artificial tears 1 drop both eyes 4 x day.  If the excess tearing is bothersome after 4-6 weeks of treatment then we can send you for further testing.  This would entail a referral to our oculoplastic specialist Dr. Dominic Beach at the [facility]-[phone].    Recommended brands are:    Systane Complete  Systane Ultra  Systane Balance  Refresh Advanced Optive  Refresh Relieva  Blink    Recommended brands for contact lens wearers are:    Systane contacts  Refresh contacts  Blink contacts    If you are using drops more than 4 x day or have sensitivities to preservatives I recommend non preserved artificial tears.  These come in 1 use vials.  They can be used every 1-2 hours.  Do not reuse the vials.    Recommended brands are:    Refresh Optive Alec-3  Systane- preservative free  Refresh-  preservative free  Blink- preservative free    Gels or ointment can be used at night.    Recommended brands are:    Systane Gel  Refresh Gel  Blink Gel  Genteal Gel    Systane night time (ointment)  Refresh Celluvisc  Refresh PM (ointment)      Visine, Clear Eyes or Murine (drops that get the red out) can irritate the eyes and cause a rebound effect where the eyes become more red and you end up using more drops.  Avoid drops containing tetrahydrozoline, naphazoline, phenylephrine, oxymetazoline.      OTC Lumify is a newer product that gives immediate redness relief without the rebound effect.  Use as needed to take the redness out.    Artificial tears may be used with other drops (such as allergy, glaucoma, antibiotics) around the same time.  Be sure to wait 5 minutes in between drops.    Heat to the eyelids can also improve your  symptoms of dry eyes.  Joya heat masks can be purchased at Amazon to be used nightly for 10-15 minutes.  Other options are gel masks that can be put in the microwave and purchased at most pharmacies.      Tea Tree Oil eyelid cleansers recommended are Ocusoft Oust foam cleanser to cleanse eyelids/lashes at night and in the am. Other options are Blephadex or Cliradex eyelid wipes.  KEEP EYES CLOSED when using these products.  These can be purchased on amazon.com   A good product for make up remover with tea tree oil is WeLoveEyes.  This can be found at www.[employer]) or [employer].    Other good eyelid cleansers have hypochlorous which removes excess bacteria and is safe around the eyes. Products are Avenova, Ocusoft Hypochlor or Heyedrate. Spray solution onto cotton pad, close eyes and gently apply to eyelids and eyelashes using side to side motion.  You can also KEEP EYES CLOSED spray and rub into eyelashes.  You do not need to rinse it off. Use morning and evening. These products can be found on Amazon.  You can check with your local pharmacy and see if they can order if for you if they don't have it.    Other brands of eyelid cleansing wipes are:    Ocusoft wipes  Systane wipes    A great eye make up line is [email or web address]

## 2022-05-04 NOTE — LETTER
5/4/2022         RE: Nestor Peterson  [address] [facility] [address]        Dear Colleague,    Thank you for referring your patient, Nestor Peterson, to the [facility]. Please see a copy of my visit note below.    Chief Complaint   Patient presents with     Diabetic Eye Exam     Accompanied by son  Chief Complaint(s) and History of Present Illness(es)     Diabetic Eye Exam     Vision: is stable    Diabetes Type: Type 2 and taking oral medications    Duration: 10 years               Lab Results   Component Value Date    A1C 7.1 02/07/2022    A1C 6.8 03/08/2021    A1C 6.7 10/13/2020    A1C 6.7 11/04/2019    A1C 6.6 02/13/2019            Last Eye Exam: 3-5 years  Dilated Previously: Yes    What are you currently using to see?   otc readers    Distance Vision Acuity: Satisfied with vision    Near Vision Acuity: Satisfied with vision while reading  with otc readers    Eye Comfort: good  Do you use eye drops? : No  Occupation or Hobbies: retired     Laxmi Lockhart Optometric Assistant, A.B.O.C.     Medical, surgical and family histories reviewed and updated 5/4/2022.       OBJECTIVE: See Ophthalmology exam    ASSESSMENT:    ICD-10-CM    1. Type 2 diabetes mellitus with other specified complication, without long-term current use of insulin (H)  E11.69 EYE EXAM (SIMPLE-NONBILLABLE)    Negative diabetic retinopathy both eyes   2. Pseudophakia of both eyes  Z96.1 EYE EXAM (SIMPLE-NONBILLABLE)   3. Dry eye syndrome of both eyes  H04.123 EYE EXAM (SIMPLE-NONBILLABLE)   4. Right epiphora  H04.201 EYE EXAM (SIMPLE-NONBILLABLE)   5. Presbyopia  H52.4 REFRACTION       PLAN:    Nestor Peterson aware  eye exam results will be sent to Francisco Javier Rojas  Patient Instructions   There are not any signs of the diabetes affecting the eyes today.  It is important that you get your eyes dilated once yearly and keep good control of your diabetes.    Systane Complete 1 drop both eyes 2-4 x day.    Heat to the  eyes daily for 10-15 minutes nightly with warm washcloth or reusable gel masks from the pharmacy or  Joya heat masks can be purchased at Amazon.    If right eye continues to tear and is bothersome then Referral to Dr. Dominic Beach at the [facility] for evaluation- [phone] for evaluation.    Eyeglass prescription given.  Ok to use OTC readers- +2.50, could try +1.25-+1.50 for puzzle making.    Return in 1 year for a complete eye exam or sooner if needed.    Wild Boogie, OD               Again, thank you for allowing me to participate in the care of your patient.        Sincerely,        Wild Boogie, OD

## 2022-05-04 NOTE — PROGRESS NOTES
Chief Complaint   Patient presents with     Diabetic Eye Exam     Accompanied by son  Chief Complaint(s) and History of Present Illness(es)     Diabetic Eye Exam     Vision: is stable    Diabetes Type: Type 2 and taking oral medications    Duration: 10 years               Lab Results   Component Value Date    A1C 7.1 02/07/2022    A1C 6.8 03/08/2021    A1C 6.7 10/13/2020    A1C 6.7 11/04/2019    A1C 6.6 02/13/2019            Last Eye Exam: 3-5 years  Dilated Previously: Yes    What are you currently using to see?   otc readers    Distance Vision Acuity: Satisfied with vision    Near Vision Acuity: Satisfied with vision while reading  with otc readers    Eye Comfort: good  Do you use eye drops? : No  Occupation or Hobbies: retired     Laxmi Lockhart Optometric Assistant, A.B.O.C.     Medical, surgical and family histories reviewed and updated 5/4/2022.       OBJECTIVE: See Ophthalmology exam    ASSESSMENT:    ICD-10-CM    1. Type 2 diabetes mellitus with other specified complication, without long-term current use of insulin (H)  E11.69 EYE EXAM (SIMPLE-NONBILLABLE)    Negative diabetic retinopathy both eyes   2. Pseudophakia of both eyes  Z96.1 EYE EXAM (SIMPLE-NONBILLABLE)   3. Dry eye syndrome of both eyes  H04.123 EYE EXAM (SIMPLE-NONBILLABLE)   4. Right epiphora  H04.201 EYE EXAM (SIMPLE-NONBILLABLE)   5. Presbyopia  H52.4 REFRACTION       PLAN:    Nestor Peterson aware  eye exam results will be sent to Francisco Javier Rojas  Patient Instructions   There are not any signs of the diabetes affecting the eyes today.  It is important that you get your eyes dilated once yearly and keep good control of your diabetes.    Systane Complete 1 drop both eyes 2-4 x day.    Heat to the eyes daily for 10-15 minutes nightly with warm washcloth or reusable gel masks from the pharmacy or  Tunesat heat masks can be purchased at Amazon.    If right eye continues to tear and is bothersome then Referral to Dr. Dominic Beach at the [facility]  Clinic for evaluation- [phone] for evaluation.    Eyeglass prescription given.  Ok to use OTC readers- +2.50, could try +1.25-+1.50 for puzzle making.    Return in 1 year for a complete eye exam or sooner if needed.    Wild Boogie, OD

## 2022-05-17 ENCOUNTER — MYC REFILL (OUTPATIENT)
Dept: FAMILY MEDICINE | Facility: CLINIC | Age: 82
End: 2022-05-17
Payer: COMMERCIAL

## 2022-05-17 DIAGNOSIS — E11.9 TYPE 2 DIABETES MELLITUS WITHOUT COMPLICATION, WITHOUT LONG-TERM CURRENT USE OF INSULIN (H): ICD-10-CM

## 2022-05-17 RX ORDER — GLIPIZIDE 2.5 MG/1
TABLET, EXTENDED RELEASE ORAL
Qty: 90 TABLET | Refills: 3 | Status: CANCELLED | OUTPATIENT
Start: 2022-05-17

## 2022-05-18 RX ORDER — GLIPIZIDE 2.5 MG/1
2.5 TABLET, EXTENDED RELEASE ORAL DAILY
Qty: 90 TABLET | Refills: 3 | Status: SHIPPED | OUTPATIENT
Start: 2022-05-18 | End: 2023-06-19

## 2022-05-18 NOTE — TELEPHONE ENCOUNTER
"Routing refill request to provider for review/approval because:  Labs out of range:  Creatinine    Last Written Prescription Date:  4/14/21  Last Fill Quantity: 90,  # refills: 3   Last office visit provider:  2/7/22     Requested Prescriptions   Pending Prescriptions Disp Refills     glipiZIDE (GLUCOTROL XL) 2.5 MG 24 hr tablet 90 tablet 3       Sulfonylurea Agents Failed - 5/18/2022 12:51 PM        Failed - Patient has a recent creatinine (normal) within the past 12 mos.     Recent Labs   Lab Test 02/07/22  1724   CR 1.40*       Ok to refill medication if creatinine is low          Passed - Patient has documented A1c within the specified period of time.     If HgbA1C is 8 or greater, it needs to be on file within the past 3 months.  If less than 8, must be on file within the past 6 months.     Recent Labs   Lab Test 02/07/22  1724   A1C 7.1*             Passed - Medication is active on med list        Passed - Patient is age 18 or older        Passed - Recent (6 mo) or future (30 days) visit within the authorizing provider's specialty     Patient had office visit in the last 6 months or has a visit in the next 30 days with authorizing provider or within the authorizing provider's specialty.  See \"Patient Info\" tab in inbasket, or \"Choose Columns\" in Meds & Orders section of the refill encounter.                 Sid Christina RN 05/18/22 12:52 PM  "

## 2022-05-28 ENCOUNTER — HEALTH MAINTENANCE LETTER (OUTPATIENT)
Age: 82
End: 2022-05-28

## 2022-06-21 ENCOUNTER — IMMUNIZATION (OUTPATIENT)
Dept: NURSING | Facility: CLINIC | Age: 82
End: 2022-06-21
Payer: COMMERCIAL

## 2022-06-21 PROCEDURE — 0054A COVID-19,PF,PFIZER (12+ YRS): CPT

## 2022-06-21 PROCEDURE — 91305 COVID-19,PF,PFIZER (12+ YRS): CPT

## 2022-06-27 ENCOUNTER — OFFICE VISIT (OUTPATIENT)
Dept: CARDIOLOGY | Facility: CLINIC | Age: 82
End: 2022-06-27
Attending: INTERNAL MEDICINE
Payer: COMMERCIAL

## 2022-06-27 VITALS
WEIGHT: 203 LBS | SYSTOLIC BLOOD PRESSURE: 124 MMHG | HEART RATE: 60 BPM | RESPIRATION RATE: 18 BRPM | BODY MASS INDEX: 29.13 KG/M2 | DIASTOLIC BLOOD PRESSURE: 62 MMHG

## 2022-06-27 DIAGNOSIS — R22.41 LOCALIZED SWELLING OF RIGHT LOWER LEG: Primary | ICD-10-CM

## 2022-06-27 DIAGNOSIS — E78.2 MIXED HYPERLIPIDEMIA: ICD-10-CM

## 2022-06-27 DIAGNOSIS — Z95.2 HEART VALVE REPLACED: ICD-10-CM

## 2022-06-27 PROCEDURE — 99214 OFFICE O/P EST MOD 30 MIN: CPT | Performed by: INTERNAL MEDICINE

## 2022-06-27 RX ORDER — METOPROLOL SUCCINATE 25 MG/1
25 TABLET, EXTENDED RELEASE ORAL DAILY
Qty: 90 TABLET | Refills: 3 | Status: SHIPPED | OUTPATIENT
Start: 2022-06-27 | End: 2023-10-05

## 2022-06-27 NOTE — LETTER
6/27/2022    Francisco Javier Rojas MD  [address]  [place] [address]    RE: Nestor Peterson       Dear Colleague,     I had the pleasure of seeing Nestor Peterson in the [facility].    HEART CARE ENCOUNTER CONSULTATON NOTE      [facility]  [phone]      Assessment/Recommendations   Assessment/plan:    1.  Coronary artery disease: Status post coronary artery bypass surgery with LIMA to LAD.  Circumflex was too small for bypass.  No anginal complaints.    2.  Aortic stenosis: Status post bioprosthetic aortic valve replacement.  He needs prophylaxis prior to dental procedures.    3.  Hypertension: Well-controlled  4.  Dyslipidemia  5.  Right lower extremity swelling and pain     Plan:  1.  Recommend prophylaxis prior to dental procedures  2.  Continue on daily aspirin. Patient has declined statin therapy  3.  Ultrasound for DVT  Follow-up in a year       History of Present Illness/Subjective    HPI: Nestor Peterson is a 82 year old male  with history of coronary artery disease status post LIMA to LAD bypass with residual circumflex disease (target too small for bypass) and bioprosthetic aortic valve replacement in 07/2014, ZAKIA does not tolerate CPAP, dementia, hearing loss and depression here for a follow up.   He is here today accompanied by his daughter.  He has been doing well.  He mows his own lawn.  Denies any issues with breathing difficulty or chest pain.  He did notice some right calf pain and does have some swelling in that area.  Recent Echocardiogram Results: 4/20/2021  Narrative & Impression    Left ventricle ejection fraction is normal. The calculated left ventricular ejection fraction is 63% without wall motion abnormality.    Normal right ventricular size with decreased systolic function.    Normal function of a bioprosthetic aortic valve with mean gradient of 7 mmHg. No aortic insufficiency.    When compared to the previous study dated 10/21/2018, right  ventricular function appears reduced.            Physical Examination  Review of Systems   Vitals: /62 (BP Location: Left arm, Patient Position: Sitting, Cuff Size: Adult Regular)   Pulse 60   Resp 18   Wt 92.1 kg (203 lb)   BMI 29.13 kg/m    BMI= Body mass index is 29.13 kg/m .  Wt Readings from Last 3 Encounters:   06/27/22 92.1 kg (203 lb)   02/07/22 92.1 kg (203 lb)   06/21/21 90.7 kg (200 lb)       General Appearance:   no distress, normal body habitus   ENT/Mouth: membranes moist, no oral lesions or bleeding gums.      EYES:  no scleral icterus, normal conjunctivae   Neck: no carotid bruits or thyromegaly   Chest/Lungs:   lungs are clear to auscultation   Cardiovascular:   Regular. Normal first and second heart sounds with no murmurs  + edema bilaterally    Abdomen:  no organomegaly, masses, bruits, or tenderness; bowel sounds are present   Extremities: no cyanosis or clubbing   Skin: no xanthelasma, warm.    Neurologic: normal  bilateral, no tremors     Psychiatric: alert and oriented x3, calm        Please refer above for cardiac ROS details.        Medical History  Surgical History Family History Social History   Past Medical History:   Diagnosis Date     Aortic stenosis      BPH (benign prostatic hyperplasia)      Chronic back pain      Chronic kidney disease      Degenerative joint disease      Dementia (H)      Depression      Diabetes mellitus (H)     type 2     Esophageal reflux      Fatigue      Hyperlipidemia      Hypertension      Left ventricular hypertrophy      Lumbar radiculopathy      Male erectile disorder      Neurocognitive disorder 7/20/2014     Obesity      ZAKIA on CPAP     machine broke at present     Short-term memory loss      Past Surgical History:   Procedure Laterality Date     APPENDECTOMY       CARDIAC CATHETERIZATION  06/26/2014     CARDIAC SURGERY  07/2014    status post LIMA to LAD bypass and bioprosthetic aortic valve replacement in 07/2014     CATARACT EXTRACTION  Bilateral      CATARACT IOL, RT/LT       CHOLECYSTECTOMY       HC KNEE SCOPE, DIAGNOSTIC      Description: Arthroscopy Knee Left;  Proc Date: 08/12/2010;  Comments:  Daily     HC REMOVAL GALLBLADDER      Description: Cholecystectomy;  Recorded: 12/17/2009;     IR LUMBAR EPIDURAL STEROID INJECTION  10/31/2005     IR LUMBAR EPIDURAL STEROID INJECTION  11/21/2005     IR LUMBAR EPIDURAL STEROID INJECTION  08/29/2007     IR LUMBAR EPIDURAL STEROID INJECTION  09/13/2007     ZZC APPENDECTOMY      Description: Appendectomy;  Recorded: 12/17/2009;     Family History   Problem Relation Age of Onset     Diabetes Brother      Hypertension Son         Social History     Socioeconomic History     Marital status:      Spouse name: Not on file     Number of children: Not on file     Years of education: Not on file     Highest education level: Not on file   Occupational History     Occupation: - retired   Tobacco Use     Smoking status: Never Smoker     Smokeless tobacco: Never Used   Substance and Sexual Activity     Alcohol use: Not Currently     Drug use: Not on file     Sexual activity: Not on file   Other Topics Concern     Not on file   Social History Narrative     Not on file     Social Determinants of Health     Financial Resource Strain: Not on file   Food Insecurity: Not on file   Transportation Needs: Not on file   Physical Activity: Not on file   Stress: Not on file   Social Connections: Not on file   Intimate Partner Violence: Not on file   Housing Stability: Not on file           Medications  Allergies   Current Outpatient Medications   Medication Sig Dispense Refill     ACCU-CHEK DAISHA PLUS TEST STRP strips [ACCU-CHEK DAISHA PLUS TEST STRP STRIPS] USE TO TEST BLOOD SUGAR ONCE A DAY AS DIRECTED 100 strip 3     aspirin 81 MG tablet [ASPIRIN 81 MG TABLET] Take 81 mg by mouth daily.       donepezil (ARICEPT) 5 MG tablet TAKE 1 TABLET BY MOUTH EVERYDAY AT BEDTIME 90 tablet 3     finasteride (PROSCAR) 5  MG tablet TAKE 1 TABLET BY MOUTH EVERY DAY 90 tablet 3     FLUoxetine (PROZAC) 10 MG capsule TAKE 1 CAPSULE BY MOUTH EVERY DAY 90 capsule 3     FOLIC ACID/MULTIVIT-MIN/LUTEIN (CENTRUM SILVER ORAL) [FOLIC ACID/MULTIVIT-MIN/LUTEIN (CENTRUM SILVER ORAL)] Take 1 tablet by mouth daily.       glipiZIDE (GLUCOTROL XL) 2.5 MG 24 hr tablet Take 1 tablet (2.5 mg) by mouth daily 90 tablet 3     Lancets MISC [LANCETS MISC] Use As Directed.       metFORMIN (GLUCOPHAGE) 1000 MG tablet [METFORMIN (GLUCOPHAGE) 1000 MG TABLET] TAKE 1 TABLET BY MOUTH TWICE A DAY WITH MEALS 180 tablet 3     metoprolol succinate ER (TOPROL XL) 25 MG 24 hr tablet Take 1 tablet (25 mg) by mouth daily 90 tablet 3     pregabalin (LYRICA) 50 MG capsule TAKE 1 CAPSULE BY MOUTH AT BEDTIME. 90 capsule 3     tamsulosin (FLOMAX) 0.4 MG capsule TAKE 1 CAPSULE BY MOUTH EVERY DAY 90 capsule 2     traZODone (DESYREL) 100 MG tablet TAKE 1 TABLET BY MOUTH EVERYDAY AT BEDTIME 90 tablet 1       Allergies   Allergen Reactions     Codeine Unknown          Lab Results    Chemistry/lipid CBC Cardiac Enzymes/BNP/TSH/INR   Recent Labs   Lab Test 10/13/20  1436 10/13/15  1434 11/24/14  0855   CHOL  --   --  140   HDL  --   --  27*      < > 68   TRIG  --   --  661*    < > = values in this interval not displayed.     Recent Labs   Lab Test 10/13/20  1436 01/14/20  1539 07/25/16  1228    126 96     Recent Labs   Lab Test 02/07/22  1724      POTASSIUM 4.8   CHLORIDE 105   CO2 23   GLC 98   BUN 21   CR 1.40*   GFRESTIMATED 51*   ELLE 9.5     Recent Labs   Lab Test 02/07/22  1724 03/08/21  1707 01/14/20  1539   CR 1.40* 1.43* 1.19     Recent Labs   Lab Test 02/07/22  1724 03/08/21  1707 10/13/20  1436   A1C 7.1* 6.8* 6.7*          Recent Labs   Lab Test 04/05/18  1035   WBC 4.9   HGB 16.6   HCT 48.2   MCV 96        Recent Labs   Lab Test 04/05/18  1035   HGB 16.6    No results for input(s): TROPONINI in the last 37549 hours.  No results for input(s): BNP,  NTBNPI, NTBNP in the last 33128 hours.  No results for input(s): TSH in the last 16956 hours.  No results for input(s): INR in the last 16819 hours.     Anastasiia Hu MD            Thank you for allowing me to participate in the care of your patient.      Sincerely,     Anastasiia Hu MD     [facility]  cc:   Anastasiia Hu MD  [address] [address]

## 2022-06-27 NOTE — PROGRESS NOTES
HEART CARE ENCOUNTER CONSULTATON NOTE      [facility]  [phone]      Assessment/Recommendations   Assessment/plan:    1.  Coronary artery disease: Status post coronary artery bypass surgery with LIMA to LAD.  Circumflex was too small for bypass.  No anginal complaints.    2.  Aortic stenosis: Status post bioprosthetic aortic valve replacement.  He needs prophylaxis prior to dental procedures.    3.  Hypertension: Well-controlled  4.  Dyslipidemia  5.  Right lower extremity swelling and pain     Plan:  1.  Recommend prophylaxis prior to dental procedures  2.  Continue on daily aspirin. Patient has declined statin therapy  3.  Ultrasound for DVT  Follow-up in a year       History of Present Illness/Subjective    HPI: Nestor Peterson is a 82 year old male  with history of coronary artery disease status post LIMA to LAD bypass with residual circumflex disease (target too small for bypass) and bioprosthetic aortic valve replacement in 07/2014, ZAKIA does not tolerate CPAP, dementia, hearing loss and depression here for a follow up.   He is here today accompanied by his daughter.  He has been doing well.  He mows his own lawn.  Denies any issues with breathing difficulty or chest pain.  He did notice some right calf pain and does have some swelling in that area.  Recent Echocardiogram Results: 4/20/2021  Narrative & Impression    Left ventricle ejection fraction is normal. The calculated left ventricular ejection fraction is 63% without wall motion abnormality.    Normal right ventricular size with decreased systolic function.    Normal function of a bioprosthetic aortic valve with mean gradient of 7 mmHg. No aortic insufficiency.    When compared to the previous study dated 10/21/2018, right ventricular function appears reduced.            Physical Examination  Review of Systems   Vitals: /62 (BP Location: Left arm, Patient Position: Sitting, Cuff Size: Adult Regular)   Pulse 60   Resp 18    Wt 92.1 kg (203 lb)   BMI 29.13 kg/m    BMI= Body mass index is 29.13 kg/m .  Wt Readings from Last 3 Encounters:   06/27/22 92.1 kg (203 lb)   02/07/22 92.1 kg (203 lb)   06/21/21 90.7 kg (200 lb)       General Appearance:   no distress, normal body habitus   ENT/Mouth: membranes moist, no oral lesions or bleeding gums.      EYES:  no scleral icterus, normal conjunctivae   Neck: no carotid bruits or thyromegaly   Chest/Lungs:   lungs are clear to auscultation   Cardiovascular:   Regular. Normal first and second heart sounds with no murmurs  + edema bilaterally    Abdomen:  no organomegaly, masses, bruits, or tenderness; bowel sounds are present   Extremities: no cyanosis or clubbing   Skin: no xanthelasma, warm.    Neurologic: normal  bilateral, no tremors     Psychiatric: alert and oriented x3, calm        Please refer above for cardiac ROS details.        Medical History  Surgical History Family History Social History   Past Medical History:   Diagnosis Date     Aortic stenosis      BPH (benign prostatic hyperplasia)      Chronic back pain      Chronic kidney disease      Degenerative joint disease      Dementia (H)      Depression      Diabetes mellitus (H)     type 2     Esophageal reflux      Fatigue      Hyperlipidemia      Hypertension      Left ventricular hypertrophy      Lumbar radiculopathy      Male erectile disorder      Neurocognitive disorder 7/20/2014     Obesity      ZAKIA on CPAP     machine broke at present     Short-term memory loss      Past Surgical History:   Procedure Laterality Date     APPENDECTOMY       CARDIAC CATHETERIZATION  06/26/2014     CARDIAC SURGERY  07/2014    status post LIMA to LAD bypass and bioprosthetic aortic valve replacement in 07/2014     CATARACT EXTRACTION Bilateral      CATARACT IOL, RT/LT       CHOLECYSTECTOMY       HC KNEE SCOPE, DIAGNOSTIC      Description: Arthroscopy Knee Left;  Proc Date: 08/12/2010;  Comments: Dr Daily     HC REMOVAL GALLBLADDER       Description: Cholecystectomy;  Recorded: 12/17/2009;     IR LUMBAR EPIDURAL STEROID INJECTION  10/31/2005     IR LUMBAR EPIDURAL STEROID INJECTION  11/21/2005     IR LUMBAR EPIDURAL STEROID INJECTION  08/29/2007     IR LUMBAR EPIDURAL STEROID INJECTION  09/13/2007     ZZC APPENDECTOMY      Description: Appendectomy;  Recorded: 12/17/2009;     Family History   Problem Relation Age of Onset     Diabetes Brother      Hypertension Son         Social History     Socioeconomic History     Marital status:      Spouse name: Not on file     Number of children: Not on file     Years of education: Not on file     Highest education level: Not on file   Occupational History     Occupation: - retired   Tobacco Use     Smoking status: Never Smoker     Smokeless tobacco: Never Used   Substance and Sexual Activity     Alcohol use: Not Currently     Drug use: Not on file     Sexual activity: Not on file   Other Topics Concern     Not on file   Social History Narrative     Not on file     Social Determinants of Health     Financial Resource Strain: Not on file   Food Insecurity: Not on file   Transportation Needs: Not on file   Physical Activity: Not on file   Stress: Not on file   Social Connections: Not on file   Intimate Partner Violence: Not on file   Housing Stability: Not on file           Medications  Allergies   Current Outpatient Medications   Medication Sig Dispense Refill     ACCU-CHEK DAISHA PLUS TEST STRP strips [ACCU-CHEK DAISHA PLUS TEST STRP STRIPS] USE TO TEST BLOOD SUGAR ONCE A DAY AS DIRECTED 100 strip 3     aspirin 81 MG tablet [ASPIRIN 81 MG TABLET] Take 81 mg by mouth daily.       donepezil (ARICEPT) 5 MG tablet TAKE 1 TABLET BY MOUTH EVERYDAY AT BEDTIME 90 tablet 3     finasteride (PROSCAR) 5 MG tablet TAKE 1 TABLET BY MOUTH EVERY DAY 90 tablet 3     FLUoxetine (PROZAC) 10 MG capsule TAKE 1 CAPSULE BY MOUTH EVERY DAY 90 capsule 3     FOLIC ACID/MULTIVIT-MIN/LUTEIN (CENTRUM SILVER ORAL) [FOLIC  ACID/MULTIVIT-MIN/LUTEIN (CENTRUM SILVER ORAL)] Take 1 tablet by mouth daily.       glipiZIDE (GLUCOTROL XL) 2.5 MG 24 hr tablet Take 1 tablet (2.5 mg) by mouth daily 90 tablet 3     Lancets MISC [LANCETS MISC] Use As Directed.       metFORMIN (GLUCOPHAGE) 1000 MG tablet [METFORMIN (GLUCOPHAGE) 1000 MG TABLET] TAKE 1 TABLET BY MOUTH TWICE A DAY WITH MEALS 180 tablet 3     metoprolol succinate ER (TOPROL XL) 25 MG 24 hr tablet Take 1 tablet (25 mg) by mouth daily 90 tablet 3     pregabalin (LYRICA) 50 MG capsule TAKE 1 CAPSULE BY MOUTH AT BEDTIME. 90 capsule 3     tamsulosin (FLOMAX) 0.4 MG capsule TAKE 1 CAPSULE BY MOUTH EVERY DAY 90 capsule 2     traZODone (DESYREL) 100 MG tablet TAKE 1 TABLET BY MOUTH EVERYDAY AT BEDTIME 90 tablet 1       Allergies   Allergen Reactions     Codeine Unknown          Lab Results    Chemistry/lipid CBC Cardiac Enzymes/BNP/TSH/INR   Recent Labs   Lab Test 10/13/20  1436 10/13/15  1434 11/24/14  0855   CHOL  --   --  140   HDL  --   --  27*      < > 68   TRIG  --   --  661*    < > = values in this interval not displayed.     Recent Labs   Lab Test 10/13/20  1436 01/14/20  1539 07/25/16  1228    126 96     Recent Labs   Lab Test 02/07/22  1724      POTASSIUM 4.8   CHLORIDE 105   CO2 23   GLC 98   BUN 21   CR 1.40*   GFRESTIMATED 51*   ELLE 9.5     Recent Labs   Lab Test 02/07/22  1724 03/08/21  1707 01/14/20  1539   CR 1.40* 1.43* 1.19     Recent Labs   Lab Test 02/07/22  1724 03/08/21  1707 10/13/20  1436   A1C 7.1* 6.8* 6.7*          Recent Labs   Lab Test 04/05/18  1035   WBC 4.9   HGB 16.6   HCT 48.2   MCV 96        Recent Labs   Lab Test 04/05/18  1035   HGB 16.6    No results for input(s): TROPONINI in the last 36759 hours.  No results for input(s): BNP, NTBNPI, NTBNP in the last 46601 hours.  No results for input(s): TSH in the last 14645 hours.  No results for input(s): INR in the last 87068 hours.     Anastasiia Hu MD

## 2022-07-02 DIAGNOSIS — G47.00 INSOMNIA: ICD-10-CM

## 2022-07-02 NOTE — TELEPHONE ENCOUNTER
"Routing refill request to provider for review/approval because:  Drug interaction warning    Last Written Prescription Date:  11/2/21  Last Fill Quantity: 90,  # refills: 1   Last office visit provider:  2/7/22     Requested Prescriptions   Pending Prescriptions Disp Refills     traZODone (DESYREL) 100 MG tablet [Pharmacy Med Name: TRAZODONE 100 MG TABLET] 90 tablet 1     Sig: TAKE 1 TABLET BY MOUTH EVERYDAY AT BEDTIME       Serotonin Modulators Passed - 7/2/2022 12:19 AM        Passed - Recent (12 mo) or future (30 days) visit within the authorizing provider's specialty     Patient has had an office visit with the authorizing provider or a provider within the authorizing providers department within the previous 12 mos or has a future within next 30 days. See \"Patient Info\" tab in inbasket, or \"Choose Columns\" in Meds & Orders section of the refill encounter.              Passed - Medication is active on med list        Passed - Patient is age 18 or older             Ania Treadwell 07/02/22 4:36 PM  "

## 2022-07-05 RX ORDER — TRAZODONE HYDROCHLORIDE 100 MG/1
TABLET ORAL
Qty: 90 TABLET | Refills: 1 | Status: SHIPPED | OUTPATIENT
Start: 2022-07-05 | End: 2023-06-02

## 2022-07-07 ENCOUNTER — HOSPITAL ENCOUNTER (OUTPATIENT)
Dept: ULTRASOUND IMAGING | Facility: HOSPITAL | Age: 82
Discharge: HOME | End: 2022-07-07
Attending: INTERNAL MEDICINE | Admitting: INTERNAL MEDICINE
Payer: COMMERCIAL

## 2022-07-07 DIAGNOSIS — R22.41 LOCALIZED SWELLING OF RIGHT LOWER LEG: ICD-10-CM

## 2022-07-07 PROCEDURE — 93970 EXTREMITY STUDY: CPT

## 2022-08-17 ENCOUNTER — MYC REFILL (OUTPATIENT)
Dept: FAMILY MEDICINE | Facility: CLINIC | Age: 82
End: 2022-08-17

## 2022-08-17 ENCOUNTER — OFFICE VISIT (OUTPATIENT)
Dept: FAMILY MEDICINE | Facility: CLINIC | Age: 82
End: 2022-08-17
Payer: COMMERCIAL

## 2022-08-17 VITALS
WEIGHT: 204 LBS | SYSTOLIC BLOOD PRESSURE: 137 MMHG | BODY MASS INDEX: 29.27 KG/M2 | RESPIRATION RATE: 16 BRPM | OXYGEN SATURATION: 93 % | TEMPERATURE: 98.2 F | DIASTOLIC BLOOD PRESSURE: 75 MMHG | HEART RATE: 63 BPM

## 2022-08-17 DIAGNOSIS — R35.0 URINARY FREQUENCY: Primary | ICD-10-CM

## 2022-08-17 DIAGNOSIS — E11.69 TYPE 2 DIABETES MELLITUS WITH OTHER SPECIFIED COMPLICATION, WITHOUT LONG-TERM CURRENT USE OF INSULIN (H): ICD-10-CM

## 2022-08-17 DIAGNOSIS — E11.9 TYPE 2 DIABETES MELLITUS (H): ICD-10-CM

## 2022-08-17 LAB
ALBUMIN UR-MCNC: 30 MG/DL
APPEARANCE UR: CLEAR
BACTERIA #/AREA URNS HPF: ABNORMAL /HPF
BILIRUB UR QL STRIP: NEGATIVE
COLOR UR AUTO: YELLOW
GLUCOSE BLD-MCNC: 131 MG/DL (ref 60–99)
GLUCOSE UR STRIP-MCNC: NEGATIVE MG/DL
HGB UR QL STRIP: NEGATIVE
KETONES UR STRIP-MCNC: NEGATIVE MG/DL
LEUKOCYTE ESTERASE UR QL STRIP: ABNORMAL
NITRATE UR QL: NEGATIVE
PH UR STRIP: 5 [PH] (ref 5–8)
RBC #/AREA URNS AUTO: ABNORMAL /HPF
SP GR UR STRIP: 1.02 (ref 1–1.03)
SQUAMOUS #/AREA URNS AUTO: ABNORMAL /LPF
UROBILINOGEN UR STRIP-ACNC: 0.2 E.U./DL
WBC #/AREA URNS AUTO: ABNORMAL /HPF

## 2022-08-17 PROCEDURE — 36415 COLL VENOUS BLD VENIPUNCTURE: CPT | Performed by: PHYSICIAN ASSISTANT

## 2022-08-17 PROCEDURE — 81001 URINALYSIS AUTO W/SCOPE: CPT | Performed by: PHYSICIAN ASSISTANT

## 2022-08-17 PROCEDURE — 82947 ASSAY GLUCOSE BLOOD QUANT: CPT | Performed by: PHYSICIAN ASSISTANT

## 2022-08-17 PROCEDURE — 99214 OFFICE O/P EST MOD 30 MIN: CPT | Performed by: PHYSICIAN ASSISTANT

## 2022-08-17 RX ORDER — CETIRIZINE HYDROCHLORIDE 10 MG/1
10 TABLET ORAL DAILY
COMMUNITY
End: 2024-08-19

## 2022-08-17 RX ORDER — BLOOD SUGAR DIAGNOSTIC
STRIP MISCELLANEOUS
Qty: 100 STRIP | Refills: 0 | Status: SHIPPED | OUTPATIENT
Start: 2022-08-17 | End: 2022-12-14

## 2022-08-17 NOTE — PATIENT INSTRUCTIONS
Monitor your blood glucose levels  Continue with your course of diabetes treatment.  Follow-up with your primary care provider for evaluation of your urinary frequency.

## 2022-08-17 NOTE — PROGRESS NOTES
Patient presents with:  Urinary Problem: Urinary frequency and incontinence that seems to be getting worse. Needs diabetes testing strip.       Clinical Decision Making:  Multiple etiologies and diagnoses were considered to include but not limited to hyperglycemia, urinary retention, UTI, urge incontinence and overflow.  Patient is currently on Flomax.  He will contact his primary care doctor to look for adjustments to the Flomax as well as possible diabetes treatment adjustment.  Patient will monitor his blood glucose level since he has been not doing that at home.  My suspicion is that the blood glucose levels have been elevated causing his urinary frequency. Return to see primary provider to manage the symptoms and recheck progress with blood glucose monitoring.  His blood glucose level in the office was checked and a random was well within acceptable limits.  Questions were answered to his and his daughter's satisfaction before discharge.    40 min spent on the date of the encounter in chart review, patient visit, review of tests, documentation and/ordiscussion with other providers about the issues documented above.         ICD-10-CM    1. Urinary frequency  R35.0 UA macro with reflex to Microscopic and Culture - Clinc Collect     Urine Microscopic     Glucose whole blood     Glucose whole blood   2. Type 2 diabetes mellitus with other specified complication, without long-term current use of insulin (H)  E11.69 Glucose whole blood     Glucose whole blood       Patient Instructions   Monitor your blood glucose levels  Continue with your course of diabetes treatment.  Follow-up with your primary care provider for evaluation of your urinary frequency.      HPI:  Nestor Peterson is a 82 year old male who presents toda with daughter with chief complaint of having urinary frequency.  Daughter states that the patient had multiple bouts of urinary frequency yesterday and had 1 bout of incontinence.  Patient is out of his  diabetic testing strips and is not aware of his current blood glucose levels.  He has not had vision changes, syncope presyncope balance deficits gross hematuria flank or back pain.    History obtained from chart review and the patient.    Problem List:  2021-06: Sensorineural hearing loss (SNHL) of both ears  2021-06: ZAKIA (obstructive sleep apnea)- mild (AHI 13)  2018-07: Primary osteoarthritis involving multiple joints  2017-06: Dementia (H)  2016-01: Lewy body disease (H)  2015-05: Coronary artery disease  2014-07: Neurocognitive disorder  Male Erectile Disorder  Left Ventricular Hypertrophy  Constipation  Benign prostatic hyperplasia with post-void dribbling  Seasonal allergic rhinitis due to pollen  Abnormal Electrocardiogram  History of aortic valve replacement  Type 2 diabetes mellitus (H)  Chronic Kidney Disease, Stage 3  Acute Sinusitis  Depression  Intermittent Claudication  Benign Adenomatous Polyp Of The Large Intestine  Lumbar Radiculopathy  Benign Essential Hypertension  Chronic insomnia  Hyperlipidemia  Localized Osteoarthritis Of The Knee  Lumbar Disc Degeneration  Vitamin D deficiency  Actinic keratosis  Aortic Stenosis      Past Medical History:   Diagnosis Date     Aortic stenosis      BPH (benign prostatic hyperplasia)      Chronic back pain      Chronic kidney disease      Degenerative joint disease      Dementia (H)      Depression      Diabetes mellitus (H)     type 2     Esophageal reflux      Fatigue      Hyperlipidemia      Hypertension      Left ventricular hypertrophy      Lumbar radiculopathy      Male erectile disorder      Neurocognitive disorder 7/20/2014     Obesity      ZAKIA on CPAP     machine broke at present     Short-term memory loss        Social History     Tobacco Use     Smoking status: Never Smoker     Smokeless tobacco: Never Used   Substance Use Topics     Alcohol use: Not Currently       Review of Systems  As above in HPI otherwise negative.    Vitals:    08/17/22 1030    BP: 137/75   Pulse: 63   Resp: 16   Temp: 98.2  F (36.8  C)   TempSrc: Oral   SpO2: 93%   Weight: 92.5 kg (204 lb)       General: Patient is resting comfortably no acute distress is afebrile  HEENT: Head is normocephalic atraumatic   eyes are PERRL EOMI sclera anicteric   TMs are clear bilaterally  Throat is without pharyngeal wall erythema and no exudate  No cervical lymphadenopathy present  LUNGS: Clear to auscultation bilaterally  HEART: Regular rate and rhythm  Abdomen: Nontender nondistended no rebound or guarding no masses  Skin: Without rash non-diaphoretic    Physical Exam      Labs:  Results for orders placed or performed in visit on 08/17/22   UA macro with reflex to Microscopic and Culture - Clinc Collect     Status: Abnormal    Specimen: Urine, Clean Catch   Result Value Ref Range    Color Urine Yellow Colorless, Straw, Light Yellow, Yellow    Appearance Urine Clear Clear    Glucose Urine Negative Negative mg/dL    Bilirubin Urine Negative Negative    Ketones Urine Negative Negative mg/dL    Specific Gravity Urine 1.025 1.005 - 1.030    Blood Urine Negative Negative    pH Urine 5.0 5.0 - 8.0    Protein Albumin Urine 30  (A) Negative mg/dL    Urobilinogen Urine 0.2 0.2, 1.0 E.U./dL    Nitrite Urine Negative Negative    Leukocyte Esterase Urine Small (A) Negative   Urine Microscopic     Status: Abnormal   Result Value Ref Range    Bacteria Urine None Seen None Seen /HPF    RBC Urine None Seen 0-2 /HPF /HPF    WBC Urine 0-5 0-5 /HPF /HPF    Squamous Epithelials Urine Few (A) None Seen /LPF    Narrative    Urine Culture not indicated   Glucose whole blood     Status: Abnormal   Result Value Ref Range    Glucose Whole Blood 131 (H) 60 - 99 mg/dL       At the end of the encounter, I discussed results, diagnosis, medications. Discussed red flags for immediate return to clinic/ER, as well as indications for follow up if no improvement. Patient understood and agreed to plan. Patient was stable for discharge.

## 2022-08-18 NOTE — TELEPHONE ENCOUNTER
"Last Written Prescription Date:  12/24/19  Last Fill Quantity: 100,  # refills: 3   Last office visit provider:  2/7/22     Requested Prescriptions   Pending Prescriptions Disp Refills     blood glucose (ACCU-CHEK DAISHA PLUS) test strip 100 strip 3     Sig: Use to test blood sugar * times daily or as directed.       Diabetic Supplies Protocol Passed - 8/17/2022 12:20 PM        Passed - Medication is active on med list        Passed - Patient is 18 years of age or older        Passed - Recent (6 mo) or future (30 days) visit within the authorizing provider's specialty     Patient had office visit in the last 6 months or has a visit in the next 30 days with authorizing provider.  See \"Patient Info\" tab in inbasket, or \"Choose Columns\" in Meds & Orders section of the refill encounter.                 Judi Raymundo RN 08/17/22 8:08 PM  "

## 2022-09-14 ENCOUNTER — OFFICE VISIT (OUTPATIENT)
Dept: FAMILY MEDICINE | Facility: CLINIC | Age: 82
End: 2022-09-14
Payer: COMMERCIAL

## 2022-09-14 VITALS
OXYGEN SATURATION: 95 % | BODY MASS INDEX: 28.77 KG/M2 | HEART RATE: 61 BPM | SYSTOLIC BLOOD PRESSURE: 120 MMHG | RESPIRATION RATE: 20 BRPM | DIASTOLIC BLOOD PRESSURE: 60 MMHG | WEIGHT: 201 LBS | TEMPERATURE: 98.7 F | HEIGHT: 70 IN

## 2022-09-14 DIAGNOSIS — N40.1 BENIGN PROSTATIC HYPERPLASIA WITH POST-VOID DRIBBLING: ICD-10-CM

## 2022-09-14 DIAGNOSIS — E11.69 TYPE 2 DIABETES MELLITUS WITH OTHER SPECIFIED COMPLICATION, WITHOUT LONG-TERM CURRENT USE OF INSULIN (H): ICD-10-CM

## 2022-09-14 DIAGNOSIS — I10 ESSENTIAL HYPERTENSION, BENIGN: ICD-10-CM

## 2022-09-14 DIAGNOSIS — Z23 ENCOUNTER FOR IMMUNIZATION: ICD-10-CM

## 2022-09-14 DIAGNOSIS — H90.3 SENSORINEURAL HEARING LOSS (SNHL) OF BOTH EARS: ICD-10-CM

## 2022-09-14 DIAGNOSIS — N39.43 BENIGN PROSTATIC HYPERPLASIA WITH POST-VOID DRIBBLING: ICD-10-CM

## 2022-09-14 DIAGNOSIS — F03.90 DEMENTIA WITHOUT BEHAVIORAL DISTURBANCE, UNSPECIFIED DEMENTIA TYPE: Primary | ICD-10-CM

## 2022-09-14 DIAGNOSIS — L30.9 DERMATITIS: ICD-10-CM

## 2022-09-14 LAB — HBA1C MFR BLD: 7.2 % (ref 0–5.6)

## 2022-09-14 PROCEDURE — 90662 IIV NO PRSV INCREASED AG IM: CPT | Performed by: FAMILY MEDICINE

## 2022-09-14 PROCEDURE — 99214 OFFICE O/P EST MOD 30 MIN: CPT | Mod: 25 | Performed by: FAMILY MEDICINE

## 2022-09-14 PROCEDURE — 36415 COLL VENOUS BLD VENIPUNCTURE: CPT | Performed by: FAMILY MEDICINE

## 2022-09-14 PROCEDURE — 83036 HEMOGLOBIN GLYCOSYLATED A1C: CPT | Performed by: FAMILY MEDICINE

## 2022-09-14 PROCEDURE — G0008 ADMIN INFLUENZA VIRUS VAC: HCPCS | Performed by: FAMILY MEDICINE

## 2022-09-14 ASSESSMENT — PATIENT HEALTH QUESTIONNAIRE - PHQ9
10. IF YOU CHECKED OFF ANY PROBLEMS, HOW DIFFICULT HAVE THESE PROBLEMS MADE IT FOR YOU TO DO YOUR WORK, TAKE CARE OF THINGS AT HOME, OR GET ALONG WITH OTHER PEOPLE: SOMEWHAT DIFFICULT
SUM OF ALL RESPONSES TO PHQ QUESTIONS 1-9: 8
SUM OF ALL RESPONSES TO PHQ QUESTIONS 1-9: 8

## 2022-09-14 NOTE — PROGRESS NOTES
ASSESMENT AND PLAN:  Diagnoses and all orders for this visit:  Dementia without behavioral disturbance, unspecified dementia type (H)  Stable.  No significant progression since last visit.  Recheck again in 6 months.  Counseling done today with the patient and his son.  Sensorineural hearing loss (SNHL) of both ears  Severe.  Patient son is in the process of getting his hearing aids repaired.  Benign Essential Hypertension  Well-controlled.  Continue current plan and recheck again in 6 months.  Type 2 diabetes mellitus with other specified complication, without long-term current use of insulin (H)  -     HEMOGLOBIN A1C done today shows acceptable control for his age at 7.2, continue current plan.  Recheck in 6 months.  -     INFLUENZA, QUAD, HIGH DOSE, PF, 65YR + (FLUZONE HD)  Benign prostatic hyperplasia with post-void dribbling  Worsening.  Not a surgical candidate.  Medication review and counseling done with the patient and his son.  Continue finasteride and tamsulosin.  I encouraged him to use adult diapers when needed.  Dermatitis  Likely secondary to insect bites.  Over-the-counter cortisone twice daily for 2 weeks, follow-up if not improved.  Encounter for immunization   -     INFLUENZA, QUAD, HIGH DOSE, PF, 65YR + (FLUZONE HD)      Reviewed the risks and benefits of the treatment plan with the patient and/or caregiver and we discussed indications for routine and emergent follow-up.        SUBJECTIVE:  Reason for visit:  Routine follow up  The patient and his son both feel like there has not been any significant progression in his dementia.  Memory seems stable.  He has held to his agreement from previous visit to only do short familiar driving distances during the daytime only on low-speed roads only.  His son reports that the family members give him rides just about everywhere except for his short occasional drive to the local store that he is familiar with.  Patient is frustrated by his ongoing problems  with urinary incontinence, he is reluctant to use adult diapers.  He eats 2-3 servings of fruits and vegetables daily.He consumes 1 sweetened beverage(s) daily.He exercises with enough effort to increase his heart rate 20 to 29 minutes per day.  He exercises with enough effort to increase his heart rate 5 days per week.   He is taking medications regularly.    Today's PHQ-9         PHQ-9 Total Score: 8    PHQ-9 Q9 Thoughts of better off dead/self-harm past 2 weeks :   Not at all    How difficult have these problems made it for you to do your work, take care of things at home, or get along with other people: Somewhat difficult    Patient has had an itchy rash on both buttocks over the past several days.  It started after he was out doing some yard work.  He thinks he may have gotten bit by an insect.    Past Medical History:   Diagnosis Date     Aortic stenosis      BPH (benign prostatic hyperplasia)      Chronic back pain      Chronic kidney disease      Degenerative joint disease      Dementia (H)      Depression      Diabetes mellitus (H)     type 2     Esophageal reflux      Fatigue      Hyperlipidemia      Hypertension      Left ventricular hypertrophy      Lumbar radiculopathy      Male erectile disorder      Neurocognitive disorder 7/20/2014     Obesity      ZAKIA on CPAP     machine broke at present     Short-term memory loss      Patient Active Problem List   Diagnosis     Male Erectile Disorder     Left Ventricular Hypertrophy     Constipation     Benign prostatic hyperplasia with post-void dribbling     Seasonal allergic rhinitis due to pollen     Abnormal Electrocardiogram     History of aortic valve replacement     Type 2 diabetes mellitus (H)     Chronic Kidney Disease, Stage 3     Depression     Intermittent Claudication     Benign Adenomatous Polyp Of The Large Intestine     Lumbar Radiculopathy     Benign Essential Hypertension     Chronic insomnia     Hyperlipidemia     Localized Osteoarthritis Of The  "Knee     Lumbar Disc Degeneration     Vitamin D deficiency     Sensorineural hearing loss (SNHL) of both ears     ZAKIA (obstructive sleep apnea)- mild (AHI 13)     Actinic keratosis     Coronary artery disease     Lewy body disease (H)     Dementia (H)     Primary osteoarthritis involving multiple joints     Current Outpatient Medications   Medication Sig Dispense Refill     aspirin 81 MG tablet [ASPIRIN 81 MG TABLET] Take 81 mg by mouth daily.       blood glucose (ACCU-CHEK DAISHA PLUS) test strip Use to test blood sugar 1 times daily or as directed. 100 strip 0     cetirizine (ZYRTEC) 10 MG tablet Take 10 mg by mouth daily       donepezil (ARICEPT) 5 MG tablet TAKE 1 TABLET BY MOUTH EVERYDAY AT BEDTIME 90 tablet 3     finasteride (PROSCAR) 5 MG tablet TAKE 1 TABLET BY MOUTH EVERY DAY 90 tablet 3     FLUoxetine (PROZAC) 10 MG capsule TAKE 1 CAPSULE BY MOUTH EVERY DAY 90 capsule 3     FOLIC ACID/MULTIVIT-MIN/LUTEIN (CENTRUM SILVER ORAL) [FOLIC ACID/MULTIVIT-MIN/LUTEIN (CENTRUM SILVER ORAL)] Take 1 tablet by mouth daily.       glipiZIDE (GLUCOTROL XL) 2.5 MG 24 hr tablet Take 1 tablet (2.5 mg) by mouth daily 90 tablet 3     Lancets MISC [LANCETS MISC] Use As Directed.       metFORMIN (GLUCOPHAGE) 1000 MG tablet [METFORMIN (GLUCOPHAGE) 1000 MG TABLET] TAKE 1 TABLET BY MOUTH TWICE A DAY WITH MEALS 180 tablet 3     metoprolol succinate ER (TOPROL XL) 25 MG 24 hr tablet Take 1 tablet (25 mg) by mouth daily 90 tablet 3     pregabalin (LYRICA) 50 MG capsule TAKE 1 CAPSULE BY MOUTH AT BEDTIME. 90 capsule 3     tamsulosin (FLOMAX) 0.4 MG capsule TAKE 1 CAPSULE BY MOUTH EVERY DAY 90 capsule 2     traZODone (DESYREL) 100 MG tablet TAKE 1 TABLET BY MOUTH EVERYDAY AT BEDTIME 90 tablet 1     History   Smoking Status     Never Smoker   Smokeless Tobacco     Never Used       OBJECTICE: /60 (BP Location: Right arm)   Pulse 61   Temp 98.7  F (37.1  C) (Oral)   Resp 20   Ht 1.778 m (5' 10\")   Wt 91.2 kg (201 lb)   SpO2 95%  "  BMI 28.84 kg/m       Recent Results (from the past 24 hour(s))   HEMOGLOBIN A1C    Collection Time: 09/14/22  1:18 PM   Result Value Ref Range    Hemoglobin A1C 7.2 (H) 0.0 - 5.6 %        GEN-alert, very hard of hearing, in no acute distress   HEENT-external auditory canals are open bilaterally, normal-appearing tympanic membranes.   CV-regular rate and rhythm   RESP-lungs clear   Foot exam- no ulcers.  Palpable pedal pulses.   SKIN-both buttocks have a cluster of red raised skin bumps, no ulcers or vesicles, no signs of secondary infection.      Francisco Javier Rojas MD

## 2022-12-13 DIAGNOSIS — E11.9 TYPE 2 DIABETES MELLITUS (H): ICD-10-CM

## 2022-12-13 DIAGNOSIS — Z76.0 ENCOUNTER FOR MEDICATION REFILL: Primary | ICD-10-CM

## 2022-12-14 RX ORDER — BLOOD SUGAR DIAGNOSTIC
STRIP MISCELLANEOUS
Qty: 200 STRIP | Refills: 3 | Status: SHIPPED | OUTPATIENT
Start: 2022-12-14 | End: 2024-01-02

## 2022-12-17 DIAGNOSIS — Z76.0 ENCOUNTER FOR MEDICATION REFILL: ICD-10-CM

## 2022-12-18 RX ORDER — FINASTERIDE 5 MG/1
TABLET, FILM COATED ORAL
Qty: 90 TABLET | Refills: 2 | Status: SHIPPED | OUTPATIENT
Start: 2022-12-18 | End: 2024-01-15

## 2022-12-18 NOTE — TELEPHONE ENCOUNTER
"Last Written Prescription Date:  11/11/21  Last Fill Quantity: 90,  # refills: 3   Last office visit provider:  9/14/22     Requested Prescriptions   Pending Prescriptions Disp Refills     finasteride (PROSCAR) 5 MG tablet [Pharmacy Med Name: FINASTERIDE 5 MG TABLET] 90 tablet 3     Sig: TAKE 1 TABLET BY MOUTH EVERY DAY       BPH Agents Passed - 12/17/2022 12:53 PM        Passed - Recent (12 mo) or future (30 days) visit within the authorizing provider's department     Patient has had an office visit with the authorizing provider or a provider within the authorizing providers department within the previous 12 mos or has a future within next 30 days. See \"Patient Info\" tab in inbasket, or \"Choose Columns\" in Meds & Orders section of the refill encounter.              Passed - Medication is active on med list        Passed - Patient is 18 years of age or older             Gilda Triplett RN 12/18/22 3:44 PM  "

## 2023-02-21 DIAGNOSIS — F33.1 MODERATE EPISODE OF RECURRENT MAJOR DEPRESSIVE DISORDER (H): ICD-10-CM

## 2023-02-21 DIAGNOSIS — N40.0 BPH (BENIGN PROSTATIC HYPERPLASIA): ICD-10-CM

## 2023-02-21 DIAGNOSIS — Z76.0 ENCOUNTER FOR MEDICATION REFILL: ICD-10-CM

## 2023-02-21 RX ORDER — FLUOXETINE 10 MG/1
CAPSULE ORAL
Qty: 90 CAPSULE | Refills: 3 | Status: SHIPPED | OUTPATIENT
Start: 2023-02-21 | End: 2024-05-22

## 2023-02-21 RX ORDER — PREGABALIN 50 MG/1
CAPSULE ORAL
Qty: 90 CAPSULE | Refills: 3 | Status: SHIPPED | OUTPATIENT
Start: 2023-02-21 | End: 2024-04-22

## 2023-02-21 RX ORDER — TAMSULOSIN HYDROCHLORIDE 0.4 MG/1
CAPSULE ORAL
Qty: 90 CAPSULE | Refills: 2 | Status: SHIPPED | OUTPATIENT
Start: 2023-02-21 | End: 2023-12-06

## 2023-03-15 ENCOUNTER — OFFICE VISIT (OUTPATIENT)
Dept: FAMILY MEDICINE | Facility: CLINIC | Age: 83
End: 2023-03-15
Payer: COMMERCIAL

## 2023-03-15 VITALS
DIASTOLIC BLOOD PRESSURE: 68 MMHG | SYSTOLIC BLOOD PRESSURE: 131 MMHG | HEART RATE: 66 BPM | BODY MASS INDEX: 28.58 KG/M2 | RESPIRATION RATE: 18 BRPM | WEIGHT: 204.13 LBS | OXYGEN SATURATION: 95 % | HEIGHT: 71 IN | TEMPERATURE: 98.1 F

## 2023-03-15 DIAGNOSIS — N18.30 STAGE 3 CHRONIC KIDNEY DISEASE, UNSPECIFIED WHETHER STAGE 3A OR 3B CKD (H): ICD-10-CM

## 2023-03-15 DIAGNOSIS — E11.69 TYPE 2 DIABETES MELLITUS WITH OTHER SPECIFIED COMPLICATION, WITHOUT LONG-TERM CURRENT USE OF INSULIN (H): ICD-10-CM

## 2023-03-15 DIAGNOSIS — I73.9 PERIPHERAL VASCULAR DISEASE, UNSPECIFIED (H): ICD-10-CM

## 2023-03-15 DIAGNOSIS — Z23 HIGH PRIORITY FOR 2019 NOVEL CORONAVIRUS VACCINATION: ICD-10-CM

## 2023-03-15 DIAGNOSIS — G31.83 LEWY BODY DEMENTIA WITH BEHAVIORAL DISTURBANCE (H): Primary | ICD-10-CM

## 2023-03-15 DIAGNOSIS — F02.818 LEWY BODY DEMENTIA WITH BEHAVIORAL DISTURBANCE (H): Primary | ICD-10-CM

## 2023-03-15 LAB
ANION GAP SERPL CALCULATED.3IONS-SCNC: 14 MMOL/L (ref 7–15)
BUN SERPL-MCNC: 20.3 MG/DL (ref 8–23)
CALCIUM SERPL-MCNC: 9.9 MG/DL (ref 8.8–10.2)
CHLORIDE SERPL-SCNC: 101 MMOL/L (ref 98–107)
CREAT SERPL-MCNC: 1.25 MG/DL (ref 0.67–1.17)
DEPRECATED HCO3 PLAS-SCNC: 24 MMOL/L (ref 22–29)
GFR SERPL CREATININE-BSD FRML MDRD: 58 ML/MIN/1.73M2
GLUCOSE SERPL-MCNC: 107 MG/DL (ref 70–99)
HBA1C MFR BLD: 7.1 % (ref 0–5.6)
POTASSIUM SERPL-SCNC: 4.7 MMOL/L (ref 3.4–5.3)
SODIUM SERPL-SCNC: 139 MMOL/L (ref 136–145)

## 2023-03-15 PROCEDURE — 91312 COVID-19 VACCINE BIVALENT BOOSTER 12+ (PFIZER): CPT | Performed by: FAMILY MEDICINE

## 2023-03-15 PROCEDURE — 36415 COLL VENOUS BLD VENIPUNCTURE: CPT | Performed by: FAMILY MEDICINE

## 2023-03-15 PROCEDURE — 80048 BASIC METABOLIC PNL TOTAL CA: CPT | Performed by: FAMILY MEDICINE

## 2023-03-15 PROCEDURE — 99214 OFFICE O/P EST MOD 30 MIN: CPT | Mod: 25 | Performed by: FAMILY MEDICINE

## 2023-03-15 PROCEDURE — 0124A COVID-19 VACCINE BIVALENT BOOSTER 12+ (PFIZER): CPT | Performed by: FAMILY MEDICINE

## 2023-03-15 PROCEDURE — 83036 HEMOGLOBIN GLYCOSYLATED A1C: CPT | Performed by: FAMILY MEDICINE

## 2023-03-15 ASSESSMENT — ANXIETY QUESTIONNAIRES
1. FEELING NERVOUS, ANXIOUS, OR ON EDGE: SEVERAL DAYS
GAD7 TOTAL SCORE: 7
3. WORRYING TOO MUCH ABOUT DIFFERENT THINGS: SEVERAL DAYS
8. IF YOU CHECKED OFF ANY PROBLEMS, HOW DIFFICULT HAVE THESE MADE IT FOR YOU TO DO YOUR WORK, TAKE CARE OF THINGS AT HOME, OR GET ALONG WITH OTHER PEOPLE?: NOT DIFFICULT AT ALL
7. FEELING AFRAID AS IF SOMETHING AWFUL MIGHT HAPPEN: SEVERAL DAYS
GAD7 TOTAL SCORE: 7
6. BECOMING EASILY ANNOYED OR IRRITABLE: SEVERAL DAYS
4. TROUBLE RELAXING: SEVERAL DAYS
5. BEING SO RESTLESS THAT IT IS HARD TO SIT STILL: SEVERAL DAYS
7. FEELING AFRAID AS IF SOMETHING AWFUL MIGHT HAPPEN: SEVERAL DAYS
2. NOT BEING ABLE TO STOP OR CONTROL WORRYING: SEVERAL DAYS
IF YOU CHECKED OFF ANY PROBLEMS ON THIS QUESTIONNAIRE, HOW DIFFICULT HAVE THESE PROBLEMS MADE IT FOR YOU TO DO YOUR WORK, TAKE CARE OF THINGS AT HOME, OR GET ALONG WITH OTHER PEOPLE: NOT DIFFICULT AT ALL
GAD7 TOTAL SCORE: 7

## 2023-03-15 NOTE — PROGRESS NOTES
ASSESMENT AND PLAN:  Diagnoses and all orders for this visit:  Lewy body dementia with behavioral disturbance (H)  Stable.  No significant progression over the last 6 months.  Continue current plan and recheck again in 6 months.  Coronary artery disease  No angina.  Continue medical management.  Counseling done on healthy eating.  Stage 3 chronic kidney disease, unspecified whether stage 3a or 3b CKD (H)  Stable, due for labs.  -     BASIC METABOLIC PANEL; Future  Peripheral vascular disease, unspecified (H)  Stable.  Continue medical management.  Counseled on healthy eating.  Type 2 diabetes mellitus with other specified complication, without long-term current use of insulin (H)  -     HEMOGLOBIN A1C done today is 7.1 showing acceptable control for this patient, he will continue current plan and recheck again in 6 months.  High priority for 2019 novel coronavirus vaccination  Immunization review and counseling done with the patient and his daughter.  -     COVID-19,PF,PFIZER BOOSTER BIVALENT (12+YRS)      Reviewed the risks and benefits of the treatment plan with the patient and/or caregiver and we discussed indications for routine and emergent follow-up.        SUBJECTIVE:  Both the patient and his daughter who is with him today feel like there has been no major change or decline or progression in his dementia.  The daughter gives examples of problems with short-term memory that can be quite noticeable but he continues to live independently and not have any safety issues or concerns that the patient and family have noticed.    CKD: He uses over the counter pain medication, including Shoulder, a few times a month.    Mental Health Follow-up:  Patient presents to follow-up on Depression & Anxiety.Patient's depression since last visit has been:  No change  The patient is not having other symptoms associated with depression.  Patient's anxiety since last visit has been:  No change  The patient is not having other  symptoms associated with anxiety.  Any significant life events: No  Patient is not feeling anxious or having panic attacks.  Patient has no concerns about alcohol or drug use.    Diabetes:   He presents for follow up of diabetes.  He is checking home blood glucose one time daily. He checks blood glucose before meals.  Blood glucose is never over 200 and never under 70. When his blood glucose is low, the patient is asymptomatic for confusion, blurred vision, lethargy and reports not feeling dizzy, shaky, or weak.  He has no concerns regarding his diabetes at this time.  He is not experiencing numbness or burning in feet, excessive thirst, blurry vision, weight changes or redness, sores or blisters on feet.         He eats 2-3 servings of fruits and vegetables daily.He consumes 0 sweetened beverage(s) daily.He exercises with enough effort to increase his heart rate 10 to 19 minutes per day.  He exercises with enough effort to increase his heart rate 3 or less days per week. He is missing 2 dose(s) of medications per week.  He is not taking prescribed medications regularly due to remembering to take.    Today's PHQ-9         PHQ-9 Total Score: 8    PHQ-9 Q9 Thoughts of better off dead/self-harm past 2 weeks :   Not at all    How difficult have these problems made it for you to do your work, take care of things at home, or get along with other people: Somewhat difficult  Today's JUANA-7 Score: 7    Past Medical History:   Diagnosis Date     Aortic stenosis      BPH (benign prostatic hyperplasia)      Chronic back pain      Chronic kidney disease      Degenerative joint disease      Dementia (H)      Depression      Diabetes mellitus (H)     type 2     Esophageal reflux      Fatigue      Hyperlipidemia      Hypertension      Left ventricular hypertrophy      Lumbar radiculopathy      Male erectile disorder      Neurocognitive disorder 7/20/2014     Obesity      AZKIA on CPAP     machine broke at present     Short-term memory  loss      Patient Active Problem List   Diagnosis     Male Erectile Disorder     Left Ventricular Hypertrophy     Constipation     Benign prostatic hyperplasia with post-void dribbling     Seasonal allergic rhinitis due to pollen     Abnormal Electrocardiogram     History of aortic valve replacement     Type 2 diabetes mellitus with other specified complication, without long-term current use of insulin (H)     Chronic Kidney Disease, Stage 3     Depression     Intermittent Claudication     Benign Adenomatous Polyp Of The Large Intestine     Lumbar Radiculopathy     Benign Essential Hypertension     Chronic insomnia     Hyperlipidemia     Localized Osteoarthritis Of The Knee     Lumbar Disc Degeneration     Vitamin D deficiency     Sensorineural hearing loss (SNHL) of both ears     ZAKIA (obstructive sleep apnea)- mild (AHI 13)     Actinic keratosis     Coronary artery disease     Lewy body dementia with behavioral disturbance (H)     Dementia (H)     Primary osteoarthritis involving multiple joints     Current Outpatient Medications   Medication Sig Dispense Refill     ACCU-CHEK DAISHA PLUS test strip USE TO TEST BLOOD SUGAR 1 TIMES DAILY OR AS DIRECTED. 200 strip 3     aspirin 81 MG tablet [ASPIRIN 81 MG TABLET] Take 81 mg by mouth daily.       cetirizine (ZYRTEC) 10 MG tablet Take 10 mg by mouth daily       donepezil (ARICEPT) 5 MG tablet TAKE 1 TABLET BY MOUTH EVERYDAY AT BEDTIME 90 tablet 3     finasteride (PROSCAR) 5 MG tablet TAKE 1 TABLET BY MOUTH EVERY DAY 90 tablet 2     FLUoxetine (PROZAC) 10 MG capsule TAKE 1 CAPSULE BY MOUTH EVERY DAY 90 capsule 3     FOLIC ACID/MULTIVIT-MIN/LUTEIN (CENTRUM SILVER ORAL) [FOLIC ACID/MULTIVIT-MIN/LUTEIN (CENTRUM SILVER ORAL)] Take 1 tablet by mouth daily.       glipiZIDE (GLUCOTROL XL) 2.5 MG 24 hr tablet Take 1 tablet (2.5 mg) by mouth daily 90 tablet 3     Lancets MISC [LANCETS MISC] Use As Directed.       metFORMIN (GLUCOPHAGE) 1000 MG tablet [METFORMIN (GLUCOPHAGE) 1000  "MG TABLET] TAKE 1 TABLET BY MOUTH TWICE A DAY WITH MEALS 180 tablet 3     metoprolol succinate ER (TOPROL XL) 25 MG 24 hr tablet Take 1 tablet (25 mg) by mouth daily 90 tablet 3     pregabalin (LYRICA) 50 MG capsule TAKE 1 CAPSULE BY MOUTH EVERYDAY AT BEDTIME 90 capsule 3     tamsulosin (FLOMAX) 0.4 MG capsule TAKE 1 CAPSULE BY MOUTH EVERY DAY 90 capsule 2     traZODone (DESYREL) 100 MG tablet TAKE 1 TABLET BY MOUTH EVERYDAY AT BEDTIME 90 tablet 1     History   Smoking Status     Never   Smokeless Tobacco     Never       OBJECTICE: /68   Pulse 66   Temp 98.1  F (36.7  C)   Resp 18   Ht 1.803 m (5' 10.97\")   Wt 92.6 kg (204 lb 2 oz)   SpO2 95%   BMI 28.50 kg/m       Recent Results (from the past 24 hour(s))   HEMOGLOBIN A1C    Collection Time: 03/15/23 10:29 AM   Result Value Ref Range    Hemoglobin A1C 7.1 (H) 0.0 - 5.6 %        CV-regular rate and rhythm with distant heart tones   RESP-lungs clear to auscultation   EXTREM-mild pitting edema of the ankles bilaterally, symmetric.       Francisco Javier Rojas MD      "

## 2023-04-16 DIAGNOSIS — G31.83 LEWY BODY DEMENTIA (H): ICD-10-CM

## 2023-04-16 DIAGNOSIS — F02.80 LEWY BODY DEMENTIA (H): ICD-10-CM

## 2023-04-16 DIAGNOSIS — Z76.0 ENCOUNTER FOR MEDICATION REFILL: ICD-10-CM

## 2023-04-16 RX ORDER — DONEPEZIL HYDROCHLORIDE 5 MG/1
TABLET, FILM COATED ORAL
Qty: 90 TABLET | Refills: 3 | Status: SHIPPED | OUTPATIENT
Start: 2023-04-16 | End: 2024-04-22

## 2023-04-16 NOTE — TELEPHONE ENCOUNTER
"    Last Written Prescription Date:  02/27/2022  Last Fill Quantity: 90,  # refills: 3   Last office visit provider:  03/15/2023     Requested Prescriptions   Pending Prescriptions Disp Refills     donepezil (ARICEPT) 5 MG tablet [Pharmacy Med Name: DONEPEZIL HCL 5 MG TABLET] 90 tablet 3     Sig: TAKE 1 TABLET BY MOUTH EVERYDAY AT BEDTIME       Miscellaneous Dementia Agents Passed - 4/16/2023  8:31 AM        Passed - Recent (12 mo) or future (30 days) visit within the authorizing provider's specialty     Patient has had an office visit with the authorizing provider or a provider within the authorizing providers department within the previous 12 mos or has a future within next 30 days. See \"Patient Info\" tab in inbasket, or \"Choose Columns\" in Meds & Orders section of the refill encounter.              Passed - Medication is active on med list        Passed - Patient is 18 years of age or older             Ya Morris RN 04/16/23 8:32 AM  "

## 2023-05-22 DIAGNOSIS — E11.9 DM2 (DIABETES MELLITUS, TYPE 2) (H): ICD-10-CM

## 2023-05-22 DIAGNOSIS — Z76.0 ENCOUNTER FOR MEDICATION REFILL: ICD-10-CM

## 2023-05-22 NOTE — TELEPHONE ENCOUNTER
"Last Written Prescription Date:  3/8/22  Last Fill Quantity: 180,  # refills: 3   Last office visit provider:  3/15/23     Requested Prescriptions   Pending Prescriptions Disp Refills     metFORMIN (GLUCOPHAGE) 1000 MG tablet [Pharmacy Med Name: METFORMIN HCL 1,000 MG TABLET] 180 tablet 3     Sig: TAKE 1 TABLET BY MOUTH TWICE A DAY WITH MEALS       Biguanide Agents Passed - 5/22/2023  2:41 AM        Passed - Patient is age 10 or older        Passed - Patient has documented A1c within the specified period of time.     If HgbA1C is 8 or greater, it needs to be on file within the past 3 months.  If less than 8, must be on file within the past 6 months.     Recent Labs   Lab Test 03/15/23  1029   A1C 7.1*             Passed - Patient's CR is NOT>1.4 OR Patient's EGFR is NOT<45 within past 12 mos.     Recent Labs   Lab Test 03/15/23  1029 02/07/22  1724 03/08/21  1707   GFRESTIMATED 58*   < > 48*   GFRESTBLACK  --   --  58*    < > = values in this interval not displayed.       Recent Labs   Lab Test 03/15/23  1029   CR 1.25*             Passed - Patient does NOT have a diagnosis of CHF.        Passed - Medication is active on med list        Passed - Recent (6 mo) or future (30 days) visit within the authorizing provider's specialty     Patient had office visit in the last 6 months or has a visit in the next 30 days with authorizing provider or within the authorizing provider's specialty.  See \"Patient Info\" tab in inbasket, or \"Choose Columns\" in Meds & Orders section of the refill encounter.                 Estefany Munoz RN 05/22/23 2:41 AM  "

## 2023-05-31 DIAGNOSIS — G47.00 INSOMNIA: ICD-10-CM

## 2023-06-02 RX ORDER — TRAZODONE HYDROCHLORIDE 100 MG/1
TABLET ORAL
Qty: 90 TABLET | Refills: 1 | Status: SHIPPED | OUTPATIENT
Start: 2023-06-02 | End: 2024-01-15

## 2023-06-02 NOTE — TELEPHONE ENCOUNTER
"Drug interaction warning    Last Written Prescription Date:  7/5/22  Last Fill Quantity: 90,  # refills: 1   Last office visit provider:  3/15/23     Requested Prescriptions   Pending Prescriptions Disp Refills     traZODone (DESYREL) 100 MG tablet [Pharmacy Med Name: TRAZODONE 100 MG TABLET] 90 tablet 1     Sig: TAKE 1 TABLET BY MOUTH EVERYDAY AT BEDTIME       Serotonin Modulators Passed - 5/31/2023  5:55 PM        Passed - Recent (12 mo) or future (30 days) visit within the authorizing provider's specialty     Patient has had an office visit with the authorizing provider or a provider within the authorizing providers department within the previous 12 mos or has a future within next 30 days. See \"Patient Info\" tab in inbasket, or \"Choose Columns\" in Meds & Orders section of the refill encounter.              Passed - Medication is active on med list        Passed - Patient is age 18 or older             Judi Raymundo RN 06/01/23 8:27 PM  "

## 2023-06-04 ENCOUNTER — HEALTH MAINTENANCE LETTER (OUTPATIENT)
Age: 83
End: 2023-06-04

## 2023-06-18 DIAGNOSIS — E11.9 TYPE 2 DIABETES MELLITUS WITHOUT COMPLICATION, WITHOUT LONG-TERM CURRENT USE OF INSULIN (H): ICD-10-CM

## 2023-06-19 RX ORDER — GLIPIZIDE 2.5 MG/1
TABLET, EXTENDED RELEASE ORAL
Qty: 90 TABLET | Refills: 3 | Status: SHIPPED | OUTPATIENT
Start: 2023-06-19 | End: 2024-04-10

## 2023-06-19 NOTE — TELEPHONE ENCOUNTER
"Routing refill request to provider for review/approval because:  Labs out of range:  Cr    Last Written Prescription Date:  5/18/22  Last Fill Quantity: 90,  # refills: 3   Last office visit provider:  3/15/23     Requested Prescriptions   Pending Prescriptions Disp Refills     glipiZIDE (GLUCOTROL XL) 2.5 MG 24 hr tablet [Pharmacy Med Name: GLIPIZIDE ER 2.5 MG TABLET] 90 tablet 3     Sig: TAKE 1 TABLET BY MOUTH EVERY DAY       Sulfonylurea Agents Failed - 6/18/2023  9:08 PM        Failed - Patient has a recent creatinine (normal) within the past 12 mos.     Recent Labs   Lab Test 03/15/23  1029   CR 1.25*       Ok to refill medication if creatinine is low          Passed - Patient has documented A1c within the specified period of time.     If HgbA1C is 8 or greater, it needs to be on file within the past 3 months.  If less than 8, must be on file within the past 6 months.     Recent Labs   Lab Test 03/15/23  1029   A1C 7.1*             Passed - Medication is active on med list        Passed - Patient is age 18 or older        Passed - Recent (6 mo) or future (30 days) visit within the authorizing provider's specialty     Patient had office visit in the last 6 months or has a visit in the next 30 days with authorizing provider or within the authorizing provider's specialty.  See \"Patient Info\" tab in inbasket, or \"Choose Columns\" in Meds & Orders section of the refill encounter.                 Estefany Munoz RN 06/18/23 9:12 PM  "

## 2023-08-23 NOTE — TELEPHONE ENCOUNTER
reports indicate that the patient needs AWV and Zoster vaccines. Writer intends to contact the patient to assist in scheduling and appointing for this purpose. Per clinic policy, writer will call three times prior to closing encounter   Spoke with patient to attempt to address her concerns. Patient is having a lot of pain in her back, diarrhea, and constipation. She is using the oxycodone, but is getting constipation. She wants to know if there is anything other than the oxy that she can take. She is also very nauseous.     Please advise and discuss with patient

## 2023-09-18 ENCOUNTER — OFFICE VISIT (OUTPATIENT)
Dept: FAMILY MEDICINE | Facility: CLINIC | Age: 83
End: 2023-09-18
Payer: COMMERCIAL

## 2023-09-18 VITALS
HEART RATE: 62 BPM | SYSTOLIC BLOOD PRESSURE: 138 MMHG | RESPIRATION RATE: 20 BRPM | OXYGEN SATURATION: 95 % | DIASTOLIC BLOOD PRESSURE: 75 MMHG | BODY MASS INDEX: 29.78 KG/M2 | TEMPERATURE: 98.4 F | WEIGHT: 208 LBS | HEIGHT: 70 IN

## 2023-09-18 DIAGNOSIS — G31.83 LEWY BODY DEMENTIA WITH BEHAVIORAL DISTURBANCE (H): Primary | ICD-10-CM

## 2023-09-18 DIAGNOSIS — E11.69 TYPE 2 DIABETES MELLITUS WITH OTHER SPECIFIED COMPLICATION, WITHOUT LONG-TERM CURRENT USE OF INSULIN (H): ICD-10-CM

## 2023-09-18 DIAGNOSIS — Z23 NEED FOR TDAP VACCINATION: ICD-10-CM

## 2023-09-18 DIAGNOSIS — F02.818 LEWY BODY DEMENTIA WITH BEHAVIORAL DISTURBANCE (H): Primary | ICD-10-CM

## 2023-09-18 DIAGNOSIS — N18.30 STAGE 3 CHRONIC KIDNEY DISEASE, UNSPECIFIED WHETHER STAGE 3A OR 3B CKD (H): ICD-10-CM

## 2023-09-18 DIAGNOSIS — Z23 NEED FOR VACCINATION: ICD-10-CM

## 2023-09-18 LAB
ANION GAP SERPL CALCULATED.3IONS-SCNC: 11 MMOL/L (ref 7–15)
BUN SERPL-MCNC: 23 MG/DL (ref 8–23)
CALCIUM SERPL-MCNC: 9.8 MG/DL (ref 8.8–10.2)
CHLORIDE SERPL-SCNC: 100 MMOL/L (ref 98–107)
CREAT SERPL-MCNC: 1.18 MG/DL (ref 0.67–1.17)
CREAT UR-MCNC: 171 MG/DL
DEPRECATED HCO3 PLAS-SCNC: 26 MMOL/L (ref 22–29)
EGFRCR SERPLBLD CKD-EPI 2021: 62 ML/MIN/1.73M2
GLUCOSE SERPL-MCNC: 180 MG/DL (ref 70–99)
HBA1C MFR BLD: 7.4 % (ref 0–5.6)
MICROALBUMIN UR-MCNC: 116 MG/L
MICROALBUMIN/CREAT UR: 67.84 MG/G CR (ref 0–17)
POTASSIUM SERPL-SCNC: 4.7 MMOL/L (ref 3.4–5.3)
SODIUM SERPL-SCNC: 137 MMOL/L (ref 136–145)

## 2023-09-18 PROCEDURE — 99214 OFFICE O/P EST MOD 30 MIN: CPT | Mod: 25 | Performed by: FAMILY MEDICINE

## 2023-09-18 PROCEDURE — 82043 UR ALBUMIN QUANTITATIVE: CPT | Performed by: FAMILY MEDICINE

## 2023-09-18 PROCEDURE — 83036 HEMOGLOBIN GLYCOSYLATED A1C: CPT | Performed by: FAMILY MEDICINE

## 2023-09-18 PROCEDURE — 36415 COLL VENOUS BLD VENIPUNCTURE: CPT | Performed by: FAMILY MEDICINE

## 2023-09-18 PROCEDURE — G0008 ADMIN INFLUENZA VIRUS VAC: HCPCS | Performed by: FAMILY MEDICINE

## 2023-09-18 PROCEDURE — 82570 ASSAY OF URINE CREATININE: CPT | Performed by: FAMILY MEDICINE

## 2023-09-18 PROCEDURE — 90662 IIV NO PRSV INCREASED AG IM: CPT | Performed by: FAMILY MEDICINE

## 2023-09-18 PROCEDURE — 80048 BASIC METABOLIC PNL TOTAL CA: CPT | Performed by: FAMILY MEDICINE

## 2023-09-18 ASSESSMENT — PATIENT HEALTH QUESTIONNAIRE - PHQ9
10. IF YOU CHECKED OFF ANY PROBLEMS, HOW DIFFICULT HAVE THESE PROBLEMS MADE IT FOR YOU TO DO YOUR WORK, TAKE CARE OF THINGS AT HOME, OR GET ALONG WITH OTHER PEOPLE: NOT DIFFICULT AT ALL
SUM OF ALL RESPONSES TO PHQ QUESTIONS 1-9: 6
SUM OF ALL RESPONSES TO PHQ QUESTIONS 1-9: 6

## 2023-09-18 NOTE — PROGRESS NOTES
Blanco is a 83 year old, presenting for the following health issues:  Follow Up (DEMENTIA, CKD)      9/18/2023    10:02 AM   Additional Questions   Roomed by LAILA DON CMA   Accompanied by CHILD (SON)         ASSESMENT AND PLAN:  Diagnoses and all orders for this visit:  Lewy body dementia with behavioral disturbance (H)  With some progression since last visit as detailed below.  Extensive counseling done with the patient and his son.  I recommended that the patient stopped driving.  I also recommended that they consider moving to an assisted living environment.  I also sent these recommendations over my chart in response to his daughter's message.  Stage 3 chronic kidney disease, unspecified whether stage 3a or 3b CKD (H)  -     Albumin Random Urine Quantitative with Creat Ratio; Future  -     Basic metabolic panel  (Ca, Cl, CO2, Creat, Gluc, K, Na, BUN); Future  Type 2 diabetes mellitus with other specified complication, without long-term current use of insulin (H)  -     HEMOGLOBIN A1C done today is 7.4 showing stable good control, continue with current plan and recheck again in 6 months.  Need for Tdap vaccination  -     Tdap, tetanus-diptheria-acell pertussis, (BOOSTRIX) 5-2.5-18.5 LF-MCG/0.5 MALCOLM injection; Inject 0.5 mLs into the muscle once for 1 dose  Need for vaccination  -     INFLUENZA VACCINE 65+ (FLUZONE HD)      Reviewed the risks and benefits of the treatment plan with the patient and/or caregiver and we discussed indications for routine and emergent follow-up.    33 minutes spent on the date of the encounter doing chart review, patient visit and documentation and face to face counseling on above.      SUBJECTIVE: 83-year-old male in with his son.  His son and his daughter have both noticed over the past 6 months there has been some worsening of his memory loss.  He has locked himself out of his home on several occasions.  He forgets the 4 digit code that he needs to get into the garage and he loses  his key.  He is still driving short distances to the grocery store.  His son-year-old with him and has concerns about the safety of his driving.  The daughter sent me a [employer] message also expressing concerns about the safety of the driving.  Patient is also here to follow-up on his chronic kidney disease and his type 2 diabetes.    Past Medical History:   Diagnosis Date    Aortic stenosis     BPH (benign prostatic hyperplasia)     Chronic back pain     Chronic kidney disease     Degenerative joint disease     Dementia (H)     Depression     Diabetes mellitus (H)     type 2    Esophageal reflux     Fatigue     Hyperlipidemia     Hypertension     Left ventricular hypertrophy     Lumbar radiculopathy     Male erectile disorder     Neurocognitive disorder 7/20/2014    Obesity     ZAKIA on CPAP     machine broke at present    Short-term memory loss      Patient Active Problem List   Diagnosis    Male Erectile Disorder    Left Ventricular Hypertrophy    Constipation    Benign prostatic hyperplasia with post-void dribbling    Seasonal allergic rhinitis due to pollen    Abnormal Electrocardiogram    History of aortic valve replacement    Type 2 diabetes mellitus with other specified complication, without long-term current use of insulin (H)    Chronic Kidney Disease, Stage 3    Depression    Intermittent Claudication    Benign Adenomatous Polyp Of The Large Intestine    Lumbar Radiculopathy    Benign Essential Hypertension    Chronic insomnia    Hyperlipidemia    Localized Osteoarthritis Of The Knee    Lumbar Disc Degeneration    Vitamin D deficiency    Sensorineural hearing loss (SNHL) of both ears    ZAKIA (obstructive sleep apnea)- mild (AHI 13)    Actinic keratosis    Coronary artery disease    Lewy body dementia with behavioral disturbance (H)    Dementia (H)    Primary osteoarthritis involving multiple joints     Current Outpatient Medications   Medication Sig Dispense Refill    ACCU-CHEK DAISHA PLUS test strip USE TO  "TEST BLOOD SUGAR 1 TIMES DAILY OR AS DIRECTED. 200 strip 3    aspirin 81 MG tablet [ASPIRIN 81 MG TABLET] Take 81 mg by mouth daily.      cetirizine (ZYRTEC) 10 MG tablet Take 10 mg by mouth daily      donepezil (ARICEPT) 5 MG tablet TAKE 1 TABLET BY MOUTH EVERYDAY AT BEDTIME 90 tablet 3    finasteride (PROSCAR) 5 MG tablet TAKE 1 TABLET BY MOUTH EVERY DAY 90 tablet 2    FLUoxetine (PROZAC) 10 MG capsule TAKE 1 CAPSULE BY MOUTH EVERY DAY 90 capsule 3    FOLIC ACID/MULTIVIT-MIN/LUTEIN (CENTRUM SILVER ORAL) [FOLIC ACID/MULTIVIT-MIN/LUTEIN (CENTRUM SILVER ORAL)] Take 1 tablet by mouth daily.      glipiZIDE (GLUCOTROL XL) 2.5 MG 24 hr tablet TAKE 1 TABLET BY MOUTH EVERY DAY 90 tablet 3    Lancets MISC [LANCETS MISC] Use As Directed.      metFORMIN (GLUCOPHAGE) 1000 MG tablet TAKE 1 TABLET BY MOUTH TWICE A DAY WITH MEALS 180 tablet 1    metoprolol succinate ER (TOPROL XL) 25 MG 24 hr tablet Take 1 tablet (25 mg) by mouth daily 90 tablet 3    pregabalin (LYRICA) 50 MG capsule TAKE 1 CAPSULE BY MOUTH EVERYDAY AT BEDTIME 90 capsule 3    tamsulosin (FLOMAX) 0.4 MG capsule TAKE 1 CAPSULE BY MOUTH EVERY DAY 90 capsule 2    Tdap, tetanus-diptheria-acell pertussis, (BOOSTRIX) 5-2.5-18.5 LF-MCG/0.5 MALCOLM injection Inject 0.5 mLs into the muscle once for 1 dose 0.5 mL 0    traZODone (DESYREL) 100 MG tablet TAKE 1 TABLET BY MOUTH EVERYDAY AT BEDTIME 90 tablet 1     History   Smoking Status    Never   Smokeless Tobacco    Never       OBJECTICE: /75 (BP Location: Left arm, Patient Position: Sitting, Cuff Size: Adult Small)   Pulse 62   Temp 98.4  F (36.9  C) (Oral)   Resp 20   Ht 1.778 m (5' 10\")   Wt 94.3 kg (208 lb)   SpO2 95%   BMI 29.84 kg/m       Recent Results (from the past 24 hour(s))   HEMOGLOBIN A1C    Collection Time: 09/18/23 10:52 AM   Result Value Ref Range    Hemoglobin A1C 7.4 (H) 0.0 - 5.6 %   Basic metabolic panel  (Ca, Cl, CO2, Creat, Gluc, K, Na, BUN)    Collection Time: 09/18/23 10:52 AM   Result " Value Ref Range    Sodium 137 136 - 145 mmol/L    Potassium 4.7 3.4 - 5.3 mmol/L    Chloride 100 98 - 107 mmol/L    Carbon Dioxide (CO2) 26 22 - 29 mmol/L    Anion Gap 11 7 - 15 mmol/L    Urea Nitrogen 23.0 8.0 - 23.0 mg/dL    Creatinine 1.18 (H) 0.67 - 1.17 mg/dL    Calcium 9.8 8.8 - 10.2 mg/dL    Glucose 180 (H) 70 - 99 mg/dL    GFR Estimate 62 >60 mL/min/1.73m2   Albumin Random Urine Quantitative with Creat Ratio    Collection Time: 09/18/23 11:19 AM   Result Value Ref Range    Creatinine Urine mg/dL 171.0 mg/dL    Albumin Urine mg/L 116.0 mg/L    Albumin Urine mg/g Cr 67.84 (H) 0.00 - 17.00 mg/g Cr        GEN-alert, forgetful, in no acute distress   CV-regular rate and rhythm   RESP-lungs clear       Francisco Javier Rojas MD

## 2023-10-04 DIAGNOSIS — Z95.2 HEART VALVE REPLACED: ICD-10-CM

## 2023-10-05 DIAGNOSIS — E78.2 MIXED HYPERLIPIDEMIA: ICD-10-CM

## 2023-10-05 DIAGNOSIS — Z95.2 HEART VALVE REPLACED: Primary | ICD-10-CM

## 2023-10-05 RX ORDER — METOPROLOL SUCCINATE 25 MG/1
25 TABLET, EXTENDED RELEASE ORAL DAILY
Qty: 90 TABLET | Refills: 0 | Status: SHIPPED | OUTPATIENT
Start: 2023-10-05 | End: 2024-04-10

## 2023-10-22 DIAGNOSIS — Z76.0 ENCOUNTER FOR MEDICATION REFILL: ICD-10-CM

## 2023-10-22 DIAGNOSIS — N40.0 BPH (BENIGN PROSTATIC HYPERPLASIA): ICD-10-CM

## 2023-10-23 RX ORDER — TAMSULOSIN HYDROCHLORIDE 0.4 MG/1
CAPSULE ORAL
Qty: 90 CAPSULE | Refills: 2 | OUTPATIENT
Start: 2023-10-23

## 2023-12-05 DIAGNOSIS — N40.0 BPH (BENIGN PROSTATIC HYPERPLASIA): ICD-10-CM

## 2023-12-05 DIAGNOSIS — Z76.0 ENCOUNTER FOR MEDICATION REFILL: ICD-10-CM

## 2023-12-06 DIAGNOSIS — Z76.0 ENCOUNTER FOR MEDICATION REFILL: ICD-10-CM

## 2023-12-06 DIAGNOSIS — N40.0 BPH (BENIGN PROSTATIC HYPERPLASIA): ICD-10-CM

## 2023-12-06 RX ORDER — TAMSULOSIN HYDROCHLORIDE 0.4 MG/1
CAPSULE ORAL
Qty: 90 CAPSULE | Refills: 2 | Status: SHIPPED | OUTPATIENT
Start: 2023-12-06

## 2023-12-06 RX ORDER — TAMSULOSIN HYDROCHLORIDE 0.4 MG/1
CAPSULE ORAL
Qty: 90 CAPSULE | Refills: 2 | OUTPATIENT
Start: 2023-12-06

## 2023-12-31 DIAGNOSIS — E11.9 TYPE 2 DIABETES MELLITUS (H): ICD-10-CM

## 2023-12-31 DIAGNOSIS — Z76.0 ENCOUNTER FOR MEDICATION REFILL: ICD-10-CM

## 2024-01-02 RX ORDER — BLOOD SUGAR DIAGNOSTIC
STRIP MISCELLANEOUS
Qty: 100 STRIP | Refills: 0 | Status: SHIPPED | OUTPATIENT
Start: 2024-01-02 | End: 2024-07-10

## 2024-01-05 ENCOUNTER — MYC MEDICAL ADVICE (OUTPATIENT)
Dept: FAMILY MEDICINE | Facility: CLINIC | Age: 84
End: 2024-01-05
Payer: COMMERCIAL

## 2024-01-05 NOTE — TELEPHONE ENCOUNTER
Chart reviewed. Please review findings below.     9/14/2016 (10 hours)  [facility]     Yakov Watson  Last attending  Treatment team

## 2024-01-13 DIAGNOSIS — G47.00 INSOMNIA: ICD-10-CM

## 2024-01-13 DIAGNOSIS — E11.9 DM2 (DIABETES MELLITUS, TYPE 2) (H): ICD-10-CM

## 2024-01-13 DIAGNOSIS — Z76.0 ENCOUNTER FOR MEDICATION REFILL: ICD-10-CM

## 2024-01-15 RX ORDER — TRAZODONE HYDROCHLORIDE 100 MG/1
TABLET ORAL
Qty: 90 TABLET | Refills: 0 | Status: SHIPPED | OUTPATIENT
Start: 2024-01-15 | End: 2024-04-18

## 2024-01-15 RX ORDER — FINASTERIDE 5 MG/1
TABLET, FILM COATED ORAL
Qty: 90 TABLET | Refills: 2 | Status: SHIPPED | OUTPATIENT
Start: 2024-01-15

## 2024-02-01 ENCOUNTER — TELEPHONE (OUTPATIENT)
Dept: FAMILY MEDICINE | Facility: CLINIC | Age: 84
End: 2024-02-01
Payer: COMMERCIAL

## 2024-02-01 NOTE — TELEPHONE ENCOUNTER
Patient Quality Outreach    Patient is due for the following:   Physical Annual Wellness Visit    Next Steps:   Schedule a Annual Wellness Visit    Type of outreach:    Phone, spoke to patient/parent. Patient will talk to son to bring him in and will call back and get appt scheduled.    Next Steps:  Reach out within 90 days via Phone.    Max number of attempts reached: No. Will try again in 90 days if patient still on fail list.    Questions for provider review:    None           Shelly Corbin MA  Chart routed to Care Team.

## 2024-02-08 NOTE — TELEPHONE ENCOUNTER
Patient Quality Outreach    Patient is due for the following:   Physical Annual Wellness Visit    Next Steps:   Patient has upcoming appointment, these items will be addressed at that time.    Type of outreach:    Chart review performed, no outreach needed.    Next Steps:  Reach out within 90 days via Phone.    Max number of attempts reached: No. Will try again in 90 days if patient still on fail list.    Questions for provider review:    None           Shelly Corbin MA  Chart routed to Care Team.

## 2024-02-19 ENCOUNTER — TRANSFERRED RECORDS (OUTPATIENT)
Dept: HEALTH INFORMATION MANAGEMENT | Facility: CLINIC | Age: 84
End: 2024-02-19
Payer: COMMERCIAL

## 2024-02-23 ENCOUNTER — MYC MEDICAL ADVICE (OUTPATIENT)
Dept: FAMILY MEDICINE | Facility: CLINIC | Age: 84
End: 2024-02-23
Payer: COMMERCIAL

## 2024-03-21 ENCOUNTER — HOSPITAL ENCOUNTER (EMERGENCY)
Facility: HOSPITAL | Age: 84
Discharge: HOME | End: 2024-03-21
Attending: EMERGENCY MEDICINE | Admitting: EMERGENCY MEDICINE
Payer: COMMERCIAL

## 2024-03-21 VITALS
RESPIRATION RATE: 20 BRPM | DIASTOLIC BLOOD PRESSURE: 93 MMHG | TEMPERATURE: 97.8 F | OXYGEN SATURATION: 98 % | HEART RATE: 69 BPM | SYSTOLIC BLOOD PRESSURE: 168 MMHG

## 2024-03-21 DIAGNOSIS — F02.A18 MILD LEWY BODY DEMENTIA WITH OTHER BEHAVIORAL DISTURBANCE (H): ICD-10-CM

## 2024-03-21 DIAGNOSIS — N39.0 URINARY TRACT INFECTION WITHOUT HEMATURIA, SITE UNSPECIFIED: ICD-10-CM

## 2024-03-21 DIAGNOSIS — G31.83 MILD LEWY BODY DEMENTIA WITH OTHER BEHAVIORAL DISTURBANCE (H): ICD-10-CM

## 2024-03-21 LAB
ALBUMIN UR-MCNC: 50 MG/DL
ANION GAP SERPL CALCULATED.3IONS-SCNC: 12 MMOL/L (ref 7–15)
APPEARANCE UR: ABNORMAL
BACTERIA #/AREA URNS HPF: ABNORMAL /HPF
BASOPHILS # BLD AUTO: 0 10E3/UL (ref 0–0.2)
BASOPHILS NFR BLD AUTO: 1 %
BILIRUB UR QL STRIP: NEGATIVE
BUN SERPL-MCNC: 21.6 MG/DL (ref 8–23)
CALCIUM SERPL-MCNC: 9.4 MG/DL (ref 8.8–10.2)
CHLORIDE SERPL-SCNC: 99 MMOL/L (ref 98–107)
COLOR UR AUTO: YELLOW
CREAT SERPL-MCNC: 1.31 MG/DL (ref 0.67–1.17)
DEPRECATED HCO3 PLAS-SCNC: 26 MMOL/L (ref 22–29)
EGFRCR SERPLBLD CKD-EPI 2021: 54 ML/MIN/1.73M2
EOSINOPHIL # BLD AUTO: 0.1 10E3/UL (ref 0–0.7)
EOSINOPHIL NFR BLD AUTO: 2 %
ERYTHROCYTE [DISTWIDTH] IN BLOOD BY AUTOMATED COUNT: 12 % (ref 10–15)
GLUCOSE SERPL-MCNC: 278 MG/DL (ref 70–99)
GLUCOSE UR STRIP-MCNC: >1000 MG/DL
HCT VFR BLD AUTO: 46.9 % (ref 40–53)
HGB BLD-MCNC: 15.8 G/DL (ref 13.3–17.7)
HGB UR QL STRIP: ABNORMAL
IMM GRANULOCYTES # BLD: 0 10E3/UL
IMM GRANULOCYTES NFR BLD: 0 %
KETONES UR STRIP-MCNC: NEGATIVE MG/DL
LEUKOCYTE ESTERASE UR QL STRIP: ABNORMAL
LYMPHOCYTES # BLD AUTO: 0.8 10E3/UL (ref 0.8–5.3)
LYMPHOCYTES NFR BLD AUTO: 14 %
MCH RBC QN AUTO: 32.7 PG (ref 26.5–33)
MCHC RBC AUTO-ENTMCNC: 33.7 G/DL (ref 31.5–36.5)
MCV RBC AUTO: 97 FL (ref 78–100)
MONOCYTES # BLD AUTO: 0.5 10E3/UL (ref 0–1.3)
MONOCYTES NFR BLD AUTO: 8 %
MUCOUS THREADS #/AREA URNS LPF: PRESENT /LPF
NEUTROPHILS # BLD AUTO: 4.4 10E3/UL (ref 1.6–8.3)
NEUTROPHILS NFR BLD AUTO: 75 %
NITRATE UR QL: POSITIVE
NRBC # BLD AUTO: 0 10E3/UL
NRBC BLD AUTO-RTO: 0 /100
PH UR STRIP: 5.5 [PH] (ref 5–7)
PLATELET # BLD AUTO: 206 10E3/UL (ref 150–450)
POTASSIUM SERPL-SCNC: 4.6 MMOL/L (ref 3.4–5.3)
RBC # BLD AUTO: 4.83 10E6/UL (ref 4.4–5.9)
RBC URINE: 11 /HPF
SODIUM SERPL-SCNC: 137 MMOL/L (ref 135–145)
SP GR UR STRIP: 1.02 (ref 1–1.03)
SQUAMOUS EPITHELIAL: 1 /HPF
TRANSITIONAL EPI: 2 /HPF
UROBILINOGEN UR STRIP-MCNC: <2 MG/DL
WBC # BLD AUTO: 5.8 10E3/UL (ref 4–11)
WBC CLUMPS #/AREA URNS HPF: PRESENT /HPF
WBC URINE: >182 /HPF

## 2024-03-21 PROCEDURE — 81001 URINALYSIS AUTO W/SCOPE: CPT | Performed by: EMERGENCY MEDICINE

## 2024-03-21 PROCEDURE — 99284 EMERGENCY DEPT VISIT MOD MDM: CPT

## 2024-03-21 PROCEDURE — 93005 ELECTROCARDIOGRAM TRACING: CPT | Performed by: EMERGENCY MEDICINE

## 2024-03-21 PROCEDURE — 85025 COMPLETE CBC W/AUTO DIFF WBC: CPT | Performed by: EMERGENCY MEDICINE

## 2024-03-21 PROCEDURE — 36415 COLL VENOUS BLD VENIPUNCTURE: CPT | Performed by: EMERGENCY MEDICINE

## 2024-03-21 PROCEDURE — 87086 URINE CULTURE/COLONY COUNT: CPT | Performed by: EMERGENCY MEDICINE

## 2024-03-21 PROCEDURE — 87186 SC STD MICRODIL/AGAR DIL: CPT | Performed by: EMERGENCY MEDICINE

## 2024-03-21 PROCEDURE — 80048 BASIC METABOLIC PNL TOTAL CA: CPT | Performed by: EMERGENCY MEDICINE

## 2024-03-21 RX ORDER — CEFPODOXIME PROXETIL 100 MG/1
100 TABLET, FILM COATED ORAL 2 TIMES DAILY
Qty: 14 TABLET | Refills: 0 | Status: SHIPPED | OUTPATIENT
Start: 2024-03-21 | End: 2024-03-28

## 2024-03-21 ASSESSMENT — COLUMBIA-SUICIDE SEVERITY RATING SCALE - C-SSRS
1. IN THE PAST MONTH, HAVE YOU WISHED YOU WERE DEAD OR WISHED YOU COULD GO TO SLEEP AND NOT WAKE UP?: NO
6. HAVE YOU EVER DONE ANYTHING, STARTED TO DO ANYTHING, OR PREPARED TO DO ANYTHING TO END YOUR LIFE?: NO
2. HAVE YOU ACTUALLY HAD ANY THOUGHTS OF KILLING YOURSELF IN THE PAST MONTH?: NO

## 2024-03-21 ASSESSMENT — ENCOUNTER SYMPTOMS
SHORTNESS OF BREATH: 0
GASTROINTESTINAL NEGATIVE: 1
NEUROLOGICAL NEGATIVE: 1
MUSCULOSKELETAL NEGATIVE: 1

## 2024-03-21 ASSESSMENT — ACTIVITIES OF DAILY LIVING (ADL)
ADLS_ACUITY_SCORE: 35

## 2024-03-21 NOTE — DISCHARGE INSTRUCTIONS
Recommend close follow-up with primary care doctor and recommend proceeding with recommendations from care management about follow-up considerations for memory care unit and guardianship.    Keep very well hydrated right now to try and flush out the bladder infection.  This will help you to feel better and help it to get better faster.    Take the first antibiotic around dinnertime tonight.  See your clinic in 7days to have your urine retested to be sure this infection has completely gone away.    Taking your blood pressure and diabetes medications is very important.  Not taking them can make you more likely to get infections like this.

## 2024-03-21 NOTE — ED NOTES
Bed: JNED-27  Expected date: 3/21/24  Expected time: 1:17 PM  Means of arrival:   Comments:  Confusion/hypertension/[facility]

## 2024-03-21 NOTE — ED TRIAGE NOTES
Pt arrives via EMS from [facility] after worker there called due to be worried about patient walking home. Patient has hx of dementia and seems slightly confused about the situation about why he was brought in.   EMS confirmed daughters phone number to get more insight into patient's current situation.   Pt hypertensive but denies any CP, HA, or other pains. Patient alert and oriented to self, time, and where he is.      Triage Assessment (Adult)       Row Name 03/21/24 1486          Triage Assessment    Airway WDL WDL        Respiratory WDL    Respiratory WDL WDL        Skin Circulation/Temperature WDL    Skin Circulation/Temperature WDL WDL        Cardiac WDL    Cardiac WDL WDL        Peripheral/Neurovascular WDL    Peripheral Neurovascular WDL WDL

## 2024-03-21 NOTE — ED PROVIDER NOTES
EMERGENCY DEPARTMENT ENCOUNTER      NAME: Nestor Peterson  AGE: 83 year old male  YOB: 1940  MRN: [id number]  EVALUATION DATE & TIME: 3/21/2024  1:28 PM    PCP: Francisco Javier Rojas    ED PROVIDER: Eben Mcclendon MD    Chief Complaint   Patient presents with    Hypertension    Altered Mental Status     FINAL IMPRESSION:  1. 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 (See chart for details)  83 year old male presents to the Emergency Department for evaluation of confusion in the setting of dementia.  Reviewing patient's primary care clinic notes he has diagnosis of Lewy body dementia with behavioral disturbance.  Progressive decline in his function over the last several months.  Also history of chronic kidney disease and type 2 diabetes.  Patient left his house this morning and walked to [facility] where he was noted to be disoriented so EMS were contacted and the patient was brought to the emergency department for assessment.  Discussing the situation with patient's daughter this seems in line with the patient's frequent issues related to his dementia in terms of getting lost and confused after leaving his house.  They have been working to get him into memory care but have been stuck on the fact that the patient is not willing to go into memory care and has been refusing.  Memory care reporting that they could not accept the patient with his current refusals.  I have also tried to set up home health nursing at home and daily checks via home nursing which the patient has refused as well.  I had a conversation with the patient he is awake he is alert he is answering my questions on review of systems not reporting any complaints.  He certainly does demonstrate sequela of dementia in the course of our conversation.  There is no acute findings by examination to suggest encephalopathy or trauma or  infection or other metabolic derangement that could potentially be causing his symptoms escalate and I suspect that this is sequela and representative of his acute on chronic dementia.  Given his diabetes and chronic kidney disease I did recommend to the family perform a few screening laboratory testing here his ECG was performed for hypertension he has no chest pain no shortness of breath no palpitations.  I also engage care management for discussion with the daughter for consideration of starting the process to assume guardianship which may help facilitate placement.  Discussion with the family of all of the test results are negative they are comfortable taking the patient home.  I do not see indications for CT scan imaging at this point based on the above.  Will continue to monitor in the emergency department.    3:20 PM  Patient has been stable and pleasant throughout his stay.  No signs of confusion at this point.  Sequela of dementia evident with his memory issues.  No focal deficits to clinical examination otherwise.  I had discussions with the patient's daughter and also with care management who spoke with the patient.  At this point I am not seeing indications for admission to the hospital for acute level hospital care.  The patient is not consenting to TCU or memory care placement and the family is comfortable with the patient being discharged home.  We are waiting urinalysis to ensure no superimposed UTI and anticipate if that is negative we plan for discharge into the family's care and they have been provided resources on neck steps in terms of pursuing memory care placement which likely would entail establishing guardianship of the patient the county/courts.    3:28 PM  Still awaiting urine.  Patient has been stable.  Urinalysis signed out to oncoming physician anticipate discharge post urine results.       Medical Decision Making  Obtained supplemental history:Supplemental history obtained?: Documented  in chart and EMS and daughter Deepali  Reviewed external records: External records reviewed?: Documented in chart and Outpatient Record: Reviewed primary care clinic visit at [facility] on on September 18, 2023 detailing patient's background dementia history and medical history  Care impacted by chronic illness:Other: Dementia  Care significantly affected by social determinants of health:N/A  Did you consider but not order tests?: Work up considered but not performed and documented in chart, if applicable  Did you interpret images independently?: Independent interpretation of ECG and images noted in documentation, when applicable.  Consultation discussion with other provider: Discussed case with care management  Discharge. No recommendations on prescription strength medication(s). See documentation for any additional details.    At the conclusion of the encounter I discussed the results of all of the tests and the disposition. The questions were answered. The patient or family acknowledged understanding and was agreeable with the care plan.     MEDICATIONS GIVEN IN THE EMERGENCY:  Medications - No data to display    NEW PRESCRIPTIONS STARTED AT TODAY'S ER VISIT  New Prescriptions    No medications on file     =================================================================    HPI    Patient information was obtained from: EMS, patient's daughter Deepali and Blanco BRADEN Nicholas is a 83 year old male with a pertinent history of progressive dementia presents being found in [facility].  Patient has a longstanding history of dementia that since his wife's death in 2021 has been escalating in intensity.  Collateral information from EMS as well as patient's daughter.  He has a history of getting lost in a history of getting confused after he leaves his house.  Today he ambulated to [facility].  Staff there noted confusion and disorientation and so contacted the police followed by EMS and subsequently he was  brought to the emergency department for assessment.  The patient himself has no complaints.  He does not have great recollection as to the events that led to them calling the police.  He reports that he was asked a question by staff member at [employer] and it sounds like that is what prompted the escalated concern.  Patient denies headache chest pain shortness of breath abdominal pain.  There is no history to suggest trauma or fall or syncope.  He is neurologically intact with sequela of dementia evident in my conversation with the patient.  Additional history from daughter Deepali obtained by phone and she reports that this has been a longstanding progressive issue for the patient that the family has been working hard to try to get him to go to memory care but the barrier has been his unwillingness to leave his house.  They had set up memory care but memory care reporting to her that they could not accept the patient as he was refusing admission to their facility.  There has been no petition yet for guardianship.  Patient has a history of hypertension and diabetes and per daughter's report is noncompliant with his medications    REVIEW OF SYSTEMS   Review of Systems   Respiratory:  Negative for shortness of breath.    Cardiovascular:  Negative for chest pain.   Gastrointestinal: Negative.    Genitourinary: Negative.    Musculoskeletal: Negative.    Neurological: Negative.    All other systems reviewed and are negative.         PAST MEDICAL HISTORY:  Past Medical History:   Diagnosis Date    Aortic stenosis     BPH (benign prostatic hyperplasia)     Chronic back pain     Chronic kidney disease     Degenerative joint disease     Dementia (H)     Depression     Diabetes mellitus (H)     type 2    Esophageal reflux     Fatigue     Hyperlipidemia     Hypertension     Left ventricular hypertrophy     Lumbar radiculopathy     Male erectile disorder     Neurocognitive disorder 7/20/2014    Obesity     ZAKIA on CPAP     machine  dominik at present    Short-term memory loss        PAST SURGICAL HISTORY:  Past Surgical History:   Procedure Laterality Date    APPENDECTOMY      CARDIAC CATHETERIZATION  06/26/2014    CARDIAC SURGERY  07/2014    status post LIMA to LAD bypass and bioprosthetic aortic valve replacement in 07/2014    CATARACT EXTRACTION Bilateral     CATARACT IOL, RT/LT      CHOLECYSTECTOMY      HC KNEE SCOPE, DIAGNOSTIC      Description: Arthroscopy Knee Left;  Proc Date: 08/12/2010;  Comments: Dr Daily    HC REMOVAL GALLBLADDER      Description: Cholecystectomy;  Recorded: 12/17/2009;    IR LUMBAR EPIDURAL STEROID INJECTION  10/31/2005    IR LUMBAR EPIDURAL STEROID INJECTION  11/21/2005    IR LUMBAR EPIDURAL STEROID INJECTION  08/29/2007    IR LUMBAR EPIDURAL STEROID INJECTION  09/13/2007    ZZC APPENDECTOMY      Description: Appendectomy;  Recorded: 12/17/2009;           CURRENT MEDICATIONS:    aspirin 81 MG tablet  blood glucose (ACCU-CHEK DAISHA PLUS) test strip  cetirizine (ZYRTEC) 10 MG tablet  donepezil (ARICEPT) 5 MG tablet  finasteride (PROSCAR) 5 MG tablet  FLUoxetine (PROZAC) 10 MG capsule  FOLIC ACID/MULTIVIT-MIN/LUTEIN (CENTRUM SILVER ORAL)  glipiZIDE (GLUCOTROL XL) 2.5 MG 24 hr tablet  Lancets MISC  metFORMIN (GLUCOPHAGE) 1000 MG tablet  metoprolol succinate ER (TOPROL XL) 25 MG 24 hr tablet  pregabalin (LYRICA) 50 MG capsule  tamsulosin (FLOMAX) 0.4 MG capsule  traZODone (DESYREL) 100 MG tablet      ALLERGIES:  Allergies   Allergen Reactions    Codeine Unknown       FAMILY HISTORY:  Family History   Problem Relation Age of Onset    Diabetes Brother     Hypertension Son        SOCIAL HISTORY:   Social History     Socioeconomic History    Marital status:    Occupational History    Occupation: - retired   Tobacco Use    Smoking status: Never     Passive exposure: Never    Smokeless tobacco: Never   Vaping Use    Vaping Use: Never used   Substance and Sexual Activity    Alcohol use: Not Currently        VITALS:  BP (!) 167/92   Pulse 69   Temp 97.8  F (36.6  C) (Oral)   Resp 20   SpO2 98%     PHYSICAL EXAM    Constitutional: Well developed, Well nourished, NAD  HENT: Normocephalic, Atraumatic, Bilateral external ears normal, Oropharynx normal, mucous membranes moist, Nose normal. Neck-  Normal range of motion, No tenderness, Supple, No stridor.   Eyes: PERRL, EOMI, Conjunctiva normal, No discharge.   Respiratory: Normal breath sounds, No respiratory distress, No wheezing, Speaks full sentences easily. No cough.   Cardiovascular: Normal heart rate, Regular rhythm, No murmurs Chest wall nontender.    GI:  Soft, No tenderness, No masses, No flank tenderness. No rebound or guarding.  : No cva tenderness    Musculoskeletal: 2+ DP pulses. No edema. No cyanosis. Good range of motion in all major joints. No tenderness to palpation. No tenderness of the CTLS spine.   Integument: Warm, Dry, No erythema, No rash. No petechiae.   Neurologic: Alert & oriented x 3, Normal motor function, Normal sensory function, No focal deficits noted.   Psychiatric: Affect normal, Judgment normal, Mood normal. Cooperative.        LAB:  All pertinent labs reviewed and interpreted.  Results for orders placed or performed during the hospital encounter of 03/21/24   Basic metabolic panel   Result Value Ref Range    Sodium 137 135 - 145 mmol/L    Potassium 4.6 3.4 - 5.3 mmol/L    Chloride 99 98 - 107 mmol/L    Carbon Dioxide (CO2) 26 22 - 29 mmol/L    Anion Gap 12 7 - 15 mmol/L    Urea Nitrogen 21.6 8.0 - 23.0 mg/dL    Creatinine 1.31 (H) 0.67 - 1.17 mg/dL    GFR Estimate 54 (L) >60 mL/min/1.73m2    Calcium 9.4 8.8 - 10.2 mg/dL    Glucose 278 (H) 70 - 99 mg/dL   CBC with platelets and differential   Result Value Ref Range    WBC Count 5.8 4.0 - 11.0 10e3/uL    RBC Count 4.83 4.40 - 5.90 10e6/uL    Hemoglobin 15.8 13.3 - 17.7 g/dL    Hematocrit 46.9 40.0 - 53.0 %    MCV 97 78 - 100 fL    MCH 32.7 26.5 - 33.0 pg    MCHC 33.7 31.5 - 36.5  g/dL    RDW 12.0 10.0 - 15.0 %    Platelet Count 206 150 - 450 10e3/uL    % Neutrophils 75 %    % Lymphocytes 14 %    % Monocytes 8 %    % Eosinophils 2 %    % Basophils 1 %    % Immature Granulocytes 0 %    NRBCs per 100 WBC 0 <1 /100    Absolute Neutrophils 4.4 1.6 - 8.3 10e3/uL    Absolute Lymphocytes 0.8 0.8 - 5.3 10e3/uL    Absolute Monocytes 0.5 0.0 - 1.3 10e3/uL    Absolute Eosinophils 0.1 0.0 - 0.7 10e3/uL    Absolute Basophils 0.0 0.0 - 0.2 10e3/uL    Absolute Immature Granulocytes 0.0 <=0.4 10e3/uL    Absolute NRBCs 0.0 10e3/uL       RADIOLOGY:  Reviewed all pertinent imaging. Please see official radiology report.  No orders to display       EKG:    Performed at: 1344  Sinus rhythm  First-degree block  Right bundle branch block  No ectopy  In comparison to ECG from 2014 there is T wave inversion in the anterior leads and ST segments are no longer elevated  Otherwise unremarkable  Clinical impression: Sinus rhythm, first-degree block, nonspecific T wave inversions anteriorly     Eben Mcclendon MD  [facility] EMERGENCY DEPARTMENT  [address] [address] [phone]       Eben Mcclendon MD  03/21/24 1527       Eben Mcclendon MD  03/21/24 1527

## 2024-03-21 NOTE — CONSULTS
"I spoke to Deepali daughter via phone at the request of Dr. Mcclendon. Daughter had questions about next steps for placement. \"Nestor lives in a house alone. His daughter Deepali and other family have been attempting to get him to a higher level of care. He lost his wife 3 years ago and has continued to decline cognitively since. And a much greater cognitive decline over the past 6 months. We have always been told that even with his dementia he is still his own decision maker. He is not willing to give POA over to any of his family. We have a Memory Care placed picked out for him. The nurse was ready to do the eval and he wouldn't go. He is very strong about the fact that he will not move out of his house and not go to a Memory Care. We took the car away for now, but he still walks away from the house. I have worked with [facility] and they keep telling me they cannot help me any more because we already have a place for him to go, but that if he doesn't want to stay there they cannot hold him against his will\".    I gave her more specific questions to ask [facility]. I also gave her information for [facility] and the [facility] web site. I gave her a list of Elder Law Attorneys to follow up with also to try to work with them for decision making ability. She states, \"you have given me more information than anyone else so far. I have my own  that I will call first. Thank you for the help. I will come and  my dad when they are done with his tests. Just have the nurse call me when I should come\".    Lacy Dutta RN    "

## 2024-03-21 NOTE — ED NOTES
EMERGENCY DEPARTMENT PROGRESS NOTE         ED COURSE AND MEDICAL DECISION MAKING  Patient was signed out to me by Dr. Mcclendon at 3:30 PM.      Nestor Peterson is a 83 year old male, with a history of Lewy body dementia, who presents with altered mental status. Patient left home and walked to [facility] by himself, got confused, and staff there called police who brought him here. Been in contact with patient's daughter who keeps a good eye on him and his diabetes, as patient is noncompliant with medications, and says that patient refuses to go to memory care. Disposition is pending UA before discharge. Daughter will pick him up.    His urinalysis did come back as being infected.  I did look back through his chart and I see no previous UTIs to guide antibiotic take therapy.  Given his older age, male gender, this is a bit more atypical and so we will put him on a 7-day course of Vantin with plan for him to follow-up at his primary care clinic for repeat urine testing at the end of his 7-day course.    3:44 PM I spoke to Patient's daughter and updated her on lab results. She will be in to get patient soon.    Medications - No data to display  New Prescriptions    CEFPODOXIME (VANTIN) 100 MG TABLET    Take 1 tablet (100 mg) by mouth 2 times daily for 7 days       LAB  Pertinent labs results reviewed   Results for orders placed or performed during the hospital encounter of 03/21/24   Basic metabolic panel     Status: Abnormal   Result Value Ref Range    Sodium 137 135 - 145 mmol/L    Potassium 4.6 3.4 - 5.3 mmol/L    Chloride 99 98 - 107 mmol/L    Carbon Dioxide (CO2) 26 22 - 29 mmol/L    Anion Gap 12 7 - 15 mmol/L    Urea Nitrogen 21.6 8.0 - 23.0 mg/dL    Creatinine 1.31 (H) 0.67 - 1.17 mg/dL    GFR Estimate 54 (L) >60 mL/min/1.73m2    Calcium 9.4 8.8 - 10.2 mg/dL    Glucose 278 (H) 70 - 99 mg/dL   UA with Microscopic reflex to Culture     Status: Abnormal    Specimen: Urine, NOS   Result Value Ref Range    Color Urine  Yellow Colorless, Straw, Light Yellow, Yellow    Appearance Urine Turbid (A) Clear    Glucose Urine >1000 (A) Negative mg/dL    Bilirubin Urine Negative Negative    Ketones Urine Negative Negative mg/dL    Specific Gravity Urine 1.019 1.001 - 1.030    Blood Urine 0.06 mg/dL (A) Negative    pH Urine 5.5 5.0 - 7.0    Protein Albumin Urine 50 (A) Negative mg/dL    Urobilinogen Urine <2.0 <2.0 mg/dL    Nitrite Urine Positive (A) Negative    Leukocyte Esterase Urine 500 Deep/uL (A) Negative    Bacteria Urine Moderate (A) None Seen /HPF    WBC Clumps Urine Present (A) None Seen /HPF    Mucus Urine Present (A) None Seen /LPF    RBC Urine 11 (H) <=2 /HPF    WBC Urine >182 (H) <=5 /HPF    Squamous Epithelials Urine 1 <=1 /HPF    Transitional Epithelials Urine 2 (H) <=1 /HPF    Narrative    Urine Culture ordered based on laboratory criteria   CBC with platelets and differential     Status: None   Result Value Ref Range    WBC Count 5.8 4.0 - 11.0 10e3/uL    RBC Count 4.83 4.40 - 5.90 10e6/uL    Hemoglobin 15.8 13.3 - 17.7 g/dL    Hematocrit 46.9 40.0 - 53.0 %    MCV 97 78 - 100 fL    MCH 32.7 26.5 - 33.0 pg    MCHC 33.7 31.5 - 36.5 g/dL    RDW 12.0 10.0 - 15.0 %    Platelet Count 206 150 - 450 10e3/uL    % Neutrophils 75 %    % Lymphocytes 14 %    % Monocytes 8 %    % Eosinophils 2 %    % Basophils 1 %    % Immature Granulocytes 0 %    NRBCs per 100 WBC 0 <1 /100    Absolute Neutrophils 4.4 1.6 - 8.3 10e3/uL    Absolute Lymphocytes 0.8 0.8 - 5.3 10e3/uL    Absolute Monocytes 0.5 0.0 - 1.3 10e3/uL    Absolute Eosinophils 0.1 0.0 - 0.7 10e3/uL    Absolute Basophils 0.0 0.0 - 0.2 10e3/uL    Absolute Immature Granulocytes 0.0 <=0.4 10e3/uL    Absolute NRBCs 0.0 10e3/uL   CBC with platelets differential     Status: None    Narrative    The following orders were created for panel order CBC with platelets differential.  Procedure                               Abnormality         Status                     ---------                                -----------         ------                     CBC with platelets and d...[[id number]]                      Final result                 Please view results for these tests on the individual orders.         RADIOLOGY    Pertinent imaging reviewed   Please see official radiology report.  No orders to display       FINAL IMPRESSION    1. 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 Urinary tract infection without hematuria, site unspecified      I, Tena Waqas, am serving as a scribe to document services personally performed by Janis Agee MD, based on my observations and the provider's statements to me.  I, Janis Agee MD, attest that Tena Alan is acting in a scribe capacity, has observed my performance of the services and has documented them in accordance with my direction.         Janis Agee MD  03/21/24 7997

## 2024-03-22 LAB
ATRIAL RATE - MUSE: 70 BPM
DIASTOLIC BLOOD PRESSURE - MUSE: 92 MMHG
INTERPRETATION ECG - MUSE: NORMAL
P AXIS - MUSE: 48 DEGREES
PR INTERVAL - MUSE: 268 MS
QRS DURATION - MUSE: 126 MS
QT - MUSE: 428 MS
QTC - MUSE: 462 MS
R AXIS - MUSE: 127 DEGREES
SYSTOLIC BLOOD PRESSURE - MUSE: 167 MMHG
T AXIS - MUSE: 19 DEGREES
VENTRICULAR RATE- MUSE: 70 BPM

## 2024-03-23 LAB — BACTERIA UR CULT: ABNORMAL

## 2024-03-24 ENCOUNTER — TELEPHONE (OUTPATIENT)
Dept: EMERGENCY MEDICINE | Facility: HOSPITAL | Age: 84
End: 2024-03-24
Payer: COMMERCIAL

## 2024-03-24 NOTE — TELEPHONE ENCOUNTER
[facility]    Reason for call: Lab Result Notification     Lab Result (including Rx patient on, if applicable).  If culture, copy of lab report at bottom.  Lab Result: Final Urine Culture Report on 3/23/24  [facility] Emergency Dept discharge antibiotic prescribed: Cefpodoxime (Vantin) 100 MG tablet, 1 tablet (100 mg) by mouth 2 times daily for 7 days   Date of Rx (if applicable):  3/21/24     #1. Bacteria, 50,000 - 100,000 CFU/ML  Staphylococcus aureus is SUSCEPTIBLE to Antibiotic.    No change in treatment per [facility] ED lab result Urine Culture protocol.    Creatinine Level (mg/dl)   Creatinine   Date Value Ref Range Status   03/21/2024 1.31 (H) 0.67 - 1.17 mg/dL Final    Creatinine clearance (ml/min), if applicable    Serum creatinine: 1.31 mg/dL (H) 03/21/24 1404  Estimated creatinine clearance: 50.1 mL/min (A)     Patient's current Symptoms:   Spoke to Pts daughter (conrado body dementia). Relayed result, daughter states Pt has been taking meds and has follow up scheduled with PCP in a few weeks / after ABX course is over.     RN Recommendations/Instructions per [facility] lab result protocol:   [facility] ED lab result protocol utilized: Urine Cx    Patient/care giver notified to contact your PCP clinic or return to the Emergency department if your:  Symptoms return.  Symptoms do not improve after 3 days on antibiotic.  Symptoms do not resolve after completing antibiotic.  Symptoms worsen or other concerning symptoms.    Marco Amaro RN

## 2024-04-08 SDOH — HEALTH STABILITY: PHYSICAL HEALTH: ON AVERAGE, HOW MANY MINUTES DO YOU ENGAGE IN EXERCISE AT THIS LEVEL?: 0 MIN

## 2024-04-08 SDOH — HEALTH STABILITY: PHYSICAL HEALTH: ON AVERAGE, HOW MANY DAYS PER WEEK DO YOU ENGAGE IN MODERATE TO STRENUOUS EXERCISE (LIKE A BRISK WALK)?: 0 DAYS

## 2024-04-08 ASSESSMENT — SOCIAL DETERMINANTS OF HEALTH (SDOH): HOW OFTEN DO YOU GET TOGETHER WITH FRIENDS OR RELATIVES?: NEVER

## 2024-04-08 NOTE — COMMUNITY RESOURCES LIST (ENGLISH)
April 8, 2024           YOUR PERSONALIZED LIST OF SERVICES & PROGRAMS           & RECREATION    [employer]      [place] - Sports clubs and recreational activities - [facility]  [address] [place] [address] (Distance: 3.1 miles)  Language: English  Fee: Self pay, Sliding scale      [employer] AND SOFTBALL  Website: http://www.WHI Solutionleague.org      [facility] - Adult Enrichment  Phone: [phone]  Website: [email or web address]  Language: English  Hours: Mon 7:30 AM - 4:00 PM Tue 7:30 AM - 4:00 PM Wed 7:30 AM - 4:00 PM Thu 7:30 AM - 4:00 PM Fri 7:30 AM - 4:00 PM    Classes/Groups      [facility] - [employer]  [address] [place] [address] (Distance: 11.5 miles)  Phone: [phone]  Website: [email or web address]  Language: English  Fee: Insurance      [facility] - Group Exercise  [address] [place] [address] (Distance: 11.5 miles)  Phone: [phone]  Website: [email or web address]  Language: English  Fee: Self pay      [facility] - Adult Enrichment  Phone: [phone]  Website: [email or web address]  Language: English  Hours: Mon 7:30 AM - 4:00 PM Tue 7:30 AM - 4:00 PM Wed 7:30 AM - 4:00 PM Thu 7:30 AM - 4:00 PM Fri 7:30 AM - 4:00 PM               IMPORTANT NUMBERS & WEBSITES        Emergency Services  911  .   [facility]  211 [email or web address]  .   Poison Control  (613) 907-2895 http://[place]poison.org http://wis[place]poison.org  .     Suicide and Crisis Lifeline  984 http://986lifeline.org  .   Childhelp [place] Child Abuse Hotline  658.566.6370 http://Childhelphotline.org   .   [place] Sexual Assault Hotline  (438) 525-4373 (HOPE)  http://Rainn.org   .     National Runaway Safeline  (266) 101-4582 (RUNAWAY) http://MyLuvsrunaQuest Inspar.org  .   Pregnancy & Postpartum Support  Call/text 634-240-9836  [place]: http://ppsupportmn.org  [place]: http://psichapters.com/wi  .   Substance Abuse National Helpline ([facility])  156-990-HELP (1474) http://Findtreatment.gov   .                DISCLAIMER: These resources have been generated via the me[employer]ilibrium Platform. [employer] does not endorse any service providers mentioned in this resource list. [employer] does not guarantee that the services mentioned in this resource list will be available to you or will improve your health or wellness.    [facility]

## 2024-04-10 ENCOUNTER — OFFICE VISIT (OUTPATIENT)
Dept: FAMILY MEDICINE | Facility: CLINIC | Age: 84
End: 2024-04-10
Payer: COMMERCIAL

## 2024-04-10 VITALS
WEIGHT: 206.4 LBS | TEMPERATURE: 98.6 F | SYSTOLIC BLOOD PRESSURE: 123 MMHG | HEIGHT: 70 IN | BODY MASS INDEX: 29.55 KG/M2 | OXYGEN SATURATION: 95 % | RESPIRATION RATE: 20 BRPM | HEART RATE: 79 BPM | DIASTOLIC BLOOD PRESSURE: 69 MMHG

## 2024-04-10 DIAGNOSIS — Z23 NEED FOR TDAP VACCINATION: ICD-10-CM

## 2024-04-10 DIAGNOSIS — F02.818 LEWY BODY DEMENTIA WITH BEHAVIORAL DISTURBANCE (H): ICD-10-CM

## 2024-04-10 DIAGNOSIS — G31.83 LEWY BODY DEMENTIA WITH BEHAVIORAL DISTURBANCE (H): ICD-10-CM

## 2024-04-10 DIAGNOSIS — E11.69 TYPE 2 DIABETES MELLITUS WITH OTHER SPECIFIED COMPLICATION, WITHOUT LONG-TERM CURRENT USE OF INSULIN (H): ICD-10-CM

## 2024-04-10 DIAGNOSIS — I25.10 CORONARY ARTERY DISEASE INVOLVING NATIVE HEART WITHOUT ANGINA PECTORIS, UNSPECIFIED VESSEL OR LESION TYPE: ICD-10-CM

## 2024-04-10 DIAGNOSIS — N18.30 STAGE 3 CHRONIC KIDNEY DISEASE, UNSPECIFIED WHETHER STAGE 3A OR 3B CKD (H): ICD-10-CM

## 2024-04-10 DIAGNOSIS — I73.9 PERIPHERAL VASCULAR DISEASE, UNSPECIFIED (H): ICD-10-CM

## 2024-04-10 DIAGNOSIS — Z00.00 ENCOUNTER FOR MEDICARE ANNUAL WELLNESS EXAM: Primary | ICD-10-CM

## 2024-04-10 DIAGNOSIS — Z76.0 ENCOUNTER FOR MEDICATION REFILL: ICD-10-CM

## 2024-04-10 LAB — HBA1C MFR BLD: 9.6 % (ref 0–5.6)

## 2024-04-10 PROCEDURE — 99214 OFFICE O/P EST MOD 30 MIN: CPT | Mod: 25 | Performed by: FAMILY MEDICINE

## 2024-04-10 PROCEDURE — 90715 TDAP VACCINE 7 YRS/> IM: CPT | Performed by: FAMILY MEDICINE

## 2024-04-10 PROCEDURE — 90471 IMMUNIZATION ADMIN: CPT | Performed by: FAMILY MEDICINE

## 2024-04-10 PROCEDURE — G0402 INITIAL PREVENTIVE EXAM: HCPCS | Performed by: FAMILY MEDICINE

## 2024-04-10 PROCEDURE — 36415 COLL VENOUS BLD VENIPUNCTURE: CPT | Performed by: FAMILY MEDICINE

## 2024-04-10 PROCEDURE — 83036 HEMOGLOBIN GLYCOSYLATED A1C: CPT | Performed by: FAMILY MEDICINE

## 2024-04-10 RX ORDER — RESPIRATORY SYNCYTIAL VIRUS VACCINE 120MCG/0.5
0.5 KIT INTRAMUSCULAR ONCE
Qty: 1 EACH | Refills: 0 | Status: CANCELLED | OUTPATIENT
Start: 2024-04-10 | End: 2024-04-10

## 2024-04-10 RX ORDER — METOPROLOL SUCCINATE 25 MG/1
25 TABLET, EXTENDED RELEASE ORAL DAILY
Qty: 90 TABLET | Refills: 3 | Status: SHIPPED | OUTPATIENT
Start: 2024-04-10

## 2024-04-10 RX ORDER — GLIPIZIDE 5 MG/1
5 TABLET, FILM COATED, EXTENDED RELEASE ORAL DAILY
Qty: 90 TABLET | Refills: 3 | Status: SHIPPED | OUTPATIENT
Start: 2024-04-10 | End: 2024-08-18

## 2024-04-10 NOTE — PROGRESS NOTES
Preventive Care Visit  [facility] LAZ Rojas MD, Family Medicine  Apr 10, 2024      SUBJECTIVE:   Blanco is a 84 year old, presenting for the following:  Annual Visit        4/10/2024     3:22 PM   Additional Questions   Roomed by Tressa LUA   Accompanied by Son Bassem     Are you in the first 12 months of your Medicare coverage?  No    HPI -continued to slow progression of dementia with increasing problems related to patient's safety with recent ER visit and several recent episodes of becoming disoriented and lost in the community.  Patient reluctant to leave his home, family members are having an ongoing discussion with him about getting into an assisted living.      Have you ever done Advance Care Planning? (For example, a Health Directive, POLST, or a discussion with a medical provider or your loved ones about your wishes): No, advance care planning information given to patient to review.  Patient plans to discuss their wishes with loved ones or provider.         Fall risk  Fallen 2 or more times in the past year?: (!) Yes    Cognitive Screening   1) Repeat 3 items (Leader, Season, Table)    2) Clock draw: ABNORMAL 12:10 is the time patient esther  3) 3 item recall: Recalls NO objects   Results: 0 items recalled: PROBABLE COGNITIVE IMPAIRMENT, **INFORM PROVIDER**    Mini-CogTM Copyright ZOEY Seay. Licensed by the author for use in [facility]; reprinted with permission (edmund@.[facility]). All rights reserved.          Reviewed and updated as needed this visit by clinical staff   Tobacco  Allergies               Reviewed and updated as needed this visit by Provider                  Social History     Tobacco Use    Smoking status: Never     Passive exposure: Never    Smokeless tobacco: Never   Substance Use Topics    Alcohol use: Not Currently             4/8/2024     2:59 PM   Alcohol Use   Prescreen: >3 drinks/day or >7 drinks/week? Not Applicable     Do you have a current opioid  "prescription? No  Do you use any other controlled substances or medications that are not prescribed by a provider? None              Current providers sharing in care for this patient include:   Patient Care Team:  Francisco Javier Rojas MD as PCP - General (Family Medicine)  Janette Munoz, PharmD as Pharmacist (Pharmacist)  Jacqueline Prabhakar, PharmD as Pharmacist (Pharmacist)  Francisco Javier Rojas MD as Assigned PCP    The following health maintenance items are reviewed in Epic and correct as of today:  Health Maintenance   Topic Date Due    DIABETIC FOOT EXAM  Never done    ANNUAL REVIEW OF  ORDERS  Never done    DEPRESSION ACTION PLAN  Never done    RSV VACCINE (Pregnancy & 60+) (1 - 1-dose 60+ series) Never done    LIPID  11/24/2015    MEDICARE ANNUAL WELLNESS VISIT  03/08/2022    EYE EXAM  05/04/2023    COVID-19 Vaccine (6 - 2023-24 season) 09/01/2023    DTAP/TDAP/TD IMMUNIZATION (2 - Td or Tdap) 09/06/2023    A1C  03/18/2024    PHQ-9  03/18/2024    MICROALBUMIN  09/18/2024    BMP  03/21/2025    HEMOGLOBIN  03/21/2025    FALL RISK ASSESSMENT  04/10/2025    ADVANCE CARE PLANNING  03/08/2026    INFLUENZA VACCINE  Completed    Pneumococcal Vaccine: 65+ Years  Completed    URINALYSIS  Completed    ZOSTER IMMUNIZATION  Completed    IPV IMMUNIZATION  Aged Out    HPV IMMUNIZATION  Aged Out    MENINGITIS IMMUNIZATION  Aged Out    RSV MONOCLONAL ANTIBODY  Aged Out     Labs reviewed in EPIC        Review of Systems   No chest pain or shortness of breath.  No blood in the urine or blood in the stool.  Remainder of review of systems is as above or negative.    OBJECTIVE:   Ht 1.778 m (5' 10\")   Wt 93.6 kg (206 lb 6.4 oz)   BMI 29.62 kg/m     Estimated body mass index is 29.62 kg/m  as calculated from the following:    Height as of this encounter: 1.778 m (5' 10\").    Weight as of this encounter: 93.6 kg (206 lb 6.4 oz).  Physical Exam  Cardiac-regular rate and rhythm   Respiratory-lungs clear to auscultation      ASSESSMENT / " "PLAN:   Encounter for Medicare annual wellness exam    Need for Tdap vaccination  - TDAP 7+ (ADACEL,BOOSTRIX)    Lewy body dementia with behavioral disturbance (H)  Progressively worsening.  Extensive counseling with the patient and his son today.  I told the patient that as his primary care physician looking out for his best interest I recommended that he work with his family to get into an assisted living environment as soon as possible.    Type 2 diabetes mellitus with other specified complication, without long-term current use of insulin (H)  - HEMOGLOBIN A1C done today does show significant worsening so we are going to slightly increase his dose of glipizide  - glipiZIDE (GLUCOTROL XL) 5 MG 24 hr tablet; Take 1 tablet (5 mg) by mouth daily    Coronary artery disease  Stable.  Refill metoprolol.  Continue medical management.  - metoprolol succinate ER (TOPROL XL) 25 MG 24 hr tablet; Take 1 tablet (25 mg) by mouth daily    Peripheral vascular disease, unspecified (H24)  Stable.  Continue medical management.    Stage 3 chronic kidney disease, unspecified whether stage 3a or 3b CKD (H)  Stable.  Had recent labs.                Counseling  Reviewed preventive health counseling, as reflected in patient instructions      BMI  Estimated body mass index is 29.62 kg/m  as calculated from the following:    Height as of this encounter: 1.778 m (5' 10\").    Weight as of this encounter: 93.6 kg (206 lb 6.4 oz).         He reports that he has never smoked. He has never been exposed to tobacco smoke. He has never used smokeless tobacco.      Appropriate preventive services were discussed with this patient, including applicable screening as appropriate for fall prevention, nutrition, physical activity, Tobacco-use cessation, weight loss and cognition.  Checklist reviewing preventive services available has been given to the patient.    Reviewed patients plan of care and provided an AVS. The Basic Care Plan (routine screening as " documented in Health Maintenance) for Nestor meets the Care Plan requirement. This Care Plan has been established and reviewed with the Patient and son.        Prior to immunization administration, verified patients identity using patient s name and date of birth. Please see Immunization Activity for additional information.     Screening Questionnaire for Adult Immunization    Are you sick today?   No   Do you have allergies to medications, food, a vaccine component or latex?   No   Have you ever had a serious reaction after receiving a vaccination?   No   Do you have a long-term health problem with heart, lung, kidney, or metabolic disease (e.g., diabetes), asthma, a blood disorder, no spleen, complement component deficiency, a cochlear implant, or a spinal fluid leak?  Are you on long-term aspirin therapy?   Yes, diabetes and heart problem   Do you have cancer, leukemia, HIV/AIDS, or any other immune system problem?   No   Do you have a parent, brother, or sister with an immune system problem?   No   In the past 3 months, have you taken medications that affect  your immune system, such as prednisone, other steroids, or anticancer drugs; drugs for the treatment of rheumatoid arthritis, Crohn s disease, or psoriasis; or have you had radiation treatments?   No   Have you had a seizure, or a brain or other nervous system problem?   No   During the past year, have you received a transfusion of blood or blood    products, or been given immune (gamma) globulin or antiviral drug?   No   For women: Are you pregnant or is there a chance you could become       pregnant during the next month?   No   Have you received any vaccinations in the past 4 weeks?   No     Immunization questionnaire was positive for at least one answer.  Okay to give per JL.      Patient instructed to remain in clinic for 15 minutes afterwards, and to report any adverse reactions.     Screening performed by Frederick Raymond MA on 4/10/2024 at 4:05 PM.        Signed Electronically by: Francisco Javier Rojas MD    Identified Health Risks  I have reviewed Opioid Use Disorder and Substance Use Disorder risk factors and made any needed referrals.

## 2024-04-10 NOTE — PATIENT INSTRUCTIONS
Preventive Care Advice   This is general advice given by our system to help you stay healthy. However, your care team may have specific advice just for you. Please talk to your care team about your preventive care needs.  Nutrition  Eat 5 or more servings of fruits and vegetables each day.  Try wheat bread, brown rice and whole grain pasta (instead of white bread, rice, and pasta).  Get enough calcium and vitamin D. Check the label on foods and aim for 100% of the RDA (recommended daily allowance).  Lifestyle  Exercise at least 150 minutes each week   (30 minutes a day, 5 days a week).  Do muscle strengthening activities 2 days a week. These help control your weight and prevent disease.  No smoking.  Wear sunscreen to prevent skin cancer.  Have a dental exam and cleaning every 6 months.  Yearly exams  See your health care team every year to talk about:  Any changes in your health.  Any medicines your care team has prescribed.  Preventive care, family planning, and ways to prevent chronic diseases.  Shots (vaccines)   HPV shots (up to age 26), if you've never had them before.  Hepatitis B shots (up to age 59), if you've never had them before.  COVID-19 shot: Get this shot when it's due.  Flu shot: Get a flu shot every year.  Tetanus shot: Get a tetanus shot every 10 years.  Pneumococcal, hepatitis A, and RSV shots: Ask your care team if you need these based on your risk.  Shingles shot (for age 50 and up).  General health tests  Diabetes screening:  Starting at age 35, Get screened for diabetes at least every 3 years.  If you are younger than age 35, ask your care team if you should be screened for diabetes.  Cholesterol test: At age 39, start having a cholesterol test every 5 years, or more often if advised.  Bone density scan (DEXA): At age 50, ask your care team if you should have this scan for osteoporosis (brittle bones).  Hepatitis C: Get tested at least once in your life.  STIs (sexually transmitted  infections)  Before age 24: Ask your care team if you should be screened for STIs.  After age 24: Get screened for STIs if you're at risk. You are at risk for STIs (including HIV) if:  You are sexually active with more than one person.  You don't use condoms every time.  You or a partner was diagnosed with a sexually transmitted infection.  If you are at risk for HIV, ask about PrEP medicine to prevent HIV.  Get tested for HIV at least once in your life, whether you are at risk for HIV or not.  Cancer screening tests  Cervical cancer screening: If you have a cervix, begin getting regular cervical cancer screening tests at age 21. Most people who have regular screenings with normal results can stop after age 65. Talk about this with your provider.  Breast cancer scan (mammogram): If you've ever had breasts, begin having regular mammograms starting at age 40. This is a scan to check for breast cancer.  Colon cancer screening: It is important to start screening for colon cancer at age 45.  Have a colonoscopy test every 10 years (or more often if you're at risk) Or, ask your provider about stool tests like a FIT test every year or Cologuard test every 3 years.  To learn more about your testing options, visit: https://[email or web address]/911369.pdf.  For help making a decision, visit: https://bit.ly/kv05076.  Prostate cancer screening test: If you have a prostate and are age 55 to 69, ask your provider if you would benefit from a yearly prostate cancer screening test.  Lung cancer screening: If you are a current or former smoker age 50 to 80, ask your care team if ongoing lung cancer screenings are right for you.  For informational purposes only. Not to replace the advice of your health care provider. Copyright   2023 [employer]. All rights reserved. Clinically reviewed by the [facility]itions Program. Xiaoying 901571 - REV 01/24.    Learning About Activities of Daily Living  What are activities  of daily living?     Activities of daily living (ADLs) are the basic self-care tasks you do every day. These include eating, bathing, dressing, and moving around.  As you age, and if you have health problems, you may find that it's harder to do some of these tasks. If so, your doctor can suggest ideas that may help.  To measure what kind of help you may need, your doctor will ask how well you are able to do ADLs. Let your doctor know if there are any tasks that you are having trouble doing. This is an important first step to getting help. And when you have the help you need, you can stay as independent as possible.  How will a doctor assess your ADLs?  Asking about ADLs is part of a routine health checkup your doctor will likely do as you age. Your health check might be done in a doctor's office, in your home, or at a hospital. The goal is to find out if you are having any problems that could make it hard to care for yourself or that make it unsafe for you to be on your own.  To measure your ADLs, your doctor will ask how hard it is for you to do routine tasks. Your doctor may also want to know if you have changed the way you do a task because of a health problem. Your doctor may watch how you:  Walk back and forth.  Keep your balance while you stand or walk.  Move from sitting to standing or from a bed to a chair.  Button or unbutton a shirt or sweater.  Remove and put on your shoes.  It's common to feel a little worried or anxious if you find you can't do all the things you used to be able to do. Talking with your doctor about ADLs is a way to make sure you're as safe as possible and able to care for yourself as well as you can. You may want to bring a caregiver, friend, or family member to your checkup. They can help you talk to your doctor.  Follow-up care is a key part of your treatment and safety. Be sure to make and go to all appointments, and call your doctor if you are having problems. It's also a good idea  to know your test results and keep a list of the medicines you take.  Current as of: October 24, 2023               Content Version: 14.0    9223-1823 "[employer].   Care instructions adapted under license by your healthcare professional. If you have questions about a medical condition or this instruction, always ask your healthcare professional. "[employer] disclaims any warranty or liability for your use of this information.      Preventing Falls: Care Instructions  Injuries and health problems such as trouble walking or poor eyesight can increase your risk of falling. So can some medicines. But there are things you can do to help prevent falls. You can exercise to get stronger. You can also arrange your home to make it safer.    Talk to your doctor about the medicines you take. Ask if any of them increase the risk of falls and whether they can be changed or stopped.   Try to exercise regularly. It can help improve your strength and balance. This can help lower your risk of falling.     Practice fall safety and prevention.    Wear low-heeled shoes that fit well and give your feet good support. Talk to your doctor if you have foot problems that make this hard.  Carry a cellphone or wear a medical alert device that you can use to call for help.  Use stepladders instead of chairs to reach high objects. Don't climb if you're at risk for falls. Ask for help, if needed.  Wear the correct eyeglasses, if you need them.    Make your home safer.    Remove rugs, cords, clutter, and furniture from walkways.  Keep your house well lit. Use night-lights in hallways and bathrooms.  Install and use sturdy handrails on stairways.  Wear nonskid footwear, even inside. Don't walk barefoot or in socks without shoes.    Be safe outside.    Use handrails, curb cuts, and ramps whenever possible.  Keep your hands free by using a shoulder bag or backpack.  Try to walk in well-lit areas. Watch out for uneven ground,  "changes in pavement, and debris.  Be careful in the winter. Walk on the grass or gravel when sidewalks are slippery. Use de-icer on steps and walkways. Add non-slip devices to shoes.    Put grab bars and nonskid mats in your shower or tub and near the toilet. Try to use a shower chair or bath bench when bathing.   Get into a tub or shower by putting in your weaker leg first. Get out with your strong side first. Have a phone or medical alert device in the bathroom with you.   Where can you learn more?  Go to https://www.[employer].net/patiented  Enter G117 in the search box to learn more about \"Preventing Falls: Care Instructions.\"  Current as of: July 17, 2023               Content Version: 14.0    0556-6076 [employer].   Care instructions adapted under license by your healthcare professional. If you have questions about a medical condition or this instruction, always ask your healthcare professional. [employer] disclaims any warranty or liability for your use of this information.      Hearing Loss: Care Instructions  Overview     Hearing loss is a sudden or slow decrease in how well you hear. It can range from slight to profound. Permanent hearing loss can occur with aging. It also can happen when you are exposed long-term to loud noise. Examples include listening to loud music, riding motorcycles, or being around other loud machines.  Hearing loss can affect your work and home life. It can make you feel lonely or depressed. You may feel that you have lost your independence. But hearing aids and other devices can help you hear better and feel connected to others.  Follow-up care is a key part of your treatment and safety. Be sure to make and go to all appointments, and call your doctor if you are having problems. It's also a good idea to know your test results and keep a list of the medicines you take.  How can you care for yourself at home?  Avoid loud noises whenever possible. This " helps keep your hearing from getting worse.  Always wear hearing protection around loud noises.  Wear a hearing aid as directed.  A professional can help you pick a hearing aid that will work best for you.  You can also get hearing aids over the counter for mild to moderate hearing loss.  Have hearing tests as your doctor suggests. They can show whether your hearing has changed. Your hearing aid may need to be adjusted.  Use other devices as needed. These may include:  Telephone amplifiers and hearing aids that can connect to a television, stereo, radio, or microphone.  Devices that use lights or vibrations. These alert you to the doorbell, a ringing telephone, or a baby monitor.  Television closed-captioning. This shows the words at the bottom of the screen. Most new TVs can do this.  TTY (text telephone). This lets you type messages back and forth on the telephone instead of talking or listening. These devices are also called TDD. When messages are typed on the keyboard, they are sent over the phone line to a receiving TTY. The message is shown on a monitor.  Use text messaging, social media, and email if it is hard for you to communicate by telephone.  Try to learn a listening technique called speechreading. It is not lipreading. You pay attention to people's gestures, expressions, posture, and tone of voice. These clues can help you understand what a person is saying. Face the person you are talking to, and have them face you. Make sure the lighting is good. You need to see the other person's face clearly.  Think about counseling if you need help to adjust to your hearing loss.  When should you call for help?  Watch closely for changes in your health, and be sure to contact your doctor if:    You think your hearing is getting worse.     You have new symptoms, such as dizziness or nausea.   Where can you learn more?  Go to https://www.healthwise.net/patiented  Enter R798 in the search box to learn more about  "\"Hearing Loss: Care Instructions.\"  Current as of: September 27, 2023               Content Version: 14.0    4871-5741 [employer].   Care instructions adapted under license by your healthcare professional. If you have questions about a medical condition or this instruction, always ask your healthcare professional. [employer] Inc[employer] disclaims any warranty or liability for your use of this information.      Relationships for Good Health  Relationships are important for our health and happiness. Social isolation, loneliness and lack of support are bad for your health. Studies show that loneliness can harm health and limit your life span as much as high blood pressure and smoking.   Take some time to reflect on your relationships. Then answer these questions:  Are there people in your life that cause you stress or drain your energy? What can you do to set limits?  ________________________________________________________________________________________________________________________________________________________________________________________________________________________________________________________________________________________________________________________________________________  Who do you enjoy spending time with? Who can you go to for support?  ________________________________________________________________________________________________________________________________________________________________________________________________________________________________________________________________________________________________________________________________________________  What can you do to improve your relationships with " others?  __________________________________________________________________________________________________________________________________________________________________________________________________________________  ______________________________________________________________________________________________________________________________  What do you like most about your relationships with others?  ________________________________________________________________________________________________________________________________________________________________________________________________________________________________________________________________________________________________________________________________________________  My goal: ______________________________________________________________________  I will ______________________________________________________________________________________________________________________________________________________________________________________________    For informational purposes only. Not to replace the advice of your health care provider. Copyright   2018 [facility]. All rights reserved. Clinically reviewed by [facility]  [facility]. SMARTworks 951982 - Rev 04/21.    Learning About Stress  What is stress?     Stress is your body's response to a hard situation. Your body can have a physical, emotional, or mental response. Stress is a fact of life for most people, and it affects everyone differently. What causes stress for you may not be stressful for someone else.  A lot of things can cause stress. You may feel stress when you go on a job interview, take a test, or run a race. This kind of short-term stress is normal and even useful. It can help you if you need to work hard or react quickly. For example, stress can help you finish an important job on time.  Long-term stress is caused by ongoing stressful situations or events. Examples  of long-term stress include long-term health problems, ongoing problems at work, or conflicts in your family. Long-term stress can harm your health.  How does stress affect your health?  When you are stressed, your body responds as though you are in danger. It makes hormones that speed up your heart, make you breathe faster, and give you a burst of energy. This is called the fight-or-flight stress response. If the stress is over quickly, your body goes back to normal and no harm is done.  But if stress happens too often or lasts too long, it can have bad effects. Long-term stress can make you more likely to get sick, and it can make symptoms of some diseases worse. If you tense up when you are stressed, you may develop neck, shoulder, or low back pain. Stress is linked to high blood pressure and heart disease.  Stress also harms your emotional health. It can make you wick, tense, or depressed. Your relationships may suffer, and you may not do well at work or school.  What can you do to manage stress?  You can try these things to help manage stress:   Do something active. Exercise or activity can help reduce stress. Walking is a great way to get started. Even everyday activities such as housecleaning or yard work can help.  Try yoga or vinicius chi. These techniques combine exercise and meditation. You may need some training at first to learn them.  Do something you enjoy. For example, listen to music or go to a movie. Practice your hobby or do volunteer work.  Meditate. This can help you relax, because you are not worrying about what happened before or what may happen in the future.  Do guided imagery. Imagine yourself in any setting that helps you feel calm. You can use online videos, books, or a teacher to guide you.  Do breathing exercises. For example:  From a standing position, bend forward from the waist with your knees slightly bent. Let your arms dangle close to the floor.  Breathe in slowly and deeply as you  "return to a standing position. Roll up slowly and lift your head last.  Hold your breath for just a few seconds in the standing position.  Breathe out slowly and bend forward from the waist.  Let your feelings out. Talk, laugh, cry, and express anger when you need to. Talking with supportive friends or family, a counselor, or a stephen leader about your feelings is a healthy way to relieve stress. Avoid discussing your feelings with people who make you feel worse.  Write. It may help to write about things that are bothering you. This helps you find out how much stress you feel and what is causing it. When you know this, you can find better ways to cope.  What can you do to prevent stress?  You might try some of these things to help prevent stress:  Manage your time. This helps you find time to do the things you want and need to do.  Get enough sleep. Your body recovers from the stresses of the day while you are sleeping.  Get support. Your family, friends, and community can make a difference in how you experience stress.  Limit your news feed. Avoid or limit time on social media or news that may make you feel stressed.  Do something active. Exercise or activity can help reduce stress. Walking is a great way to get started.  Where can you learn more?  Go to https://www.[employer].net/patiented  Enter N032 in the search box to learn more about \"Learning About Stress.\"  Current as of: October 24, 2023               Content Version: 14.0    0716-0646 [employer].   Care instructions adapted under license by your healthcare professional. If you have questions about a medical condition or this instruction, always ask your healthcare professional. [employer] disclaims any warranty or liability for your use of this information.      Bladder Training: Care Instructions  Your Care Instructions     Bladder training is used to treat urge incontinence and stress incontinence. Urge incontinence means that " the need to urinate comes on so fast that you can't get to a toilet in time. Stress incontinence means that you leak urine because of pressure on your bladder. For example, it may happen when you laugh, cough, or lift something heavy.  Bladder training can increase how long you can wait before you have to urinate. It can also help your bladder hold more urine. And it can give you better control over the urge to urinate.  It is important to remember that bladder training takes a few weeks to a few months to make a difference. You may not see results right away, but don't give up.  Follow-up care is a key part of your treatment and safety. Be sure to make and go to all appointments, and call your doctor if you are having problems. It's also a good idea to know your test results and keep a list of the medicines you take.  How can you care for yourself at home?  Work with your doctor to come up with a bladder training program that is right for you. You may use one or more of the following methods.  Delayed urination  In the beginning, try to keep from urinating for 5 minutes after you first feel the need to go.  While you wait, take deep, slow breaths to relax. Kegel exercises can also help you delay the need to go to the bathroom.  After some practice, when you can easily wait 5 minutes to urinate, try to wait 10 minutes before you urinate.  Slowly increase the waiting period until you are able to control when you have to urinate.  Scheduled urination  Empty your bladder when you first wake up in the morning.  Schedule times throughout the day when you will urinate.  Start by going to the bathroom every hour, even if you don't need to go.  Slowly increase the time between trips to the bathroom.  When you have found a schedule that works well for you, keep doing it.  If you wake up during the night and have to urinate, do it. Apply your schedule to waking hours only.  Kegel exercises  These tighten and strengthen pelvic  "muscles, which can help you control the flow of urine. (If doing these exercises causes pain, stop doing them and talk with your doctor.) To do Kegel exercises:  Squeeze your muscles as if you were trying not to pass gas. Or squeeze your muscles as if you were stopping the flow of urine. Your belly, legs, and buttocks shouldn't move.  Hold the squeeze for 3 seconds, then relax for 5 to 10 seconds.  Start with 3 seconds, then add 1 second each week until you are able to squeeze for 10 seconds.  Repeat the exercise 10 times a session. Do 3 to 8 sessions a day.  When should you call for help?  Watch closely for changes in your health, and be sure to contact your doctor if:    Your incontinence is getting worse.     You do not get better as expected.   Where can you learn more?  Go to https://www.[employer].net/patiented  Enter V684 in the search box to learn more about \"Bladder Training: Care Instructions.\"  Current as of: November 15, 2023               Content Version: 14.0    4132-8280 [employer].   Care instructions adapted under license by your healthcare professional. If you have questions about a medical condition or this instruction, always ask your healthcare professional. [employer] disclaims any warranty or liability for your use of this information.      "

## 2024-04-18 DIAGNOSIS — G47.00 INSOMNIA: ICD-10-CM

## 2024-04-18 DIAGNOSIS — Z76.0 ENCOUNTER FOR MEDICATION REFILL: ICD-10-CM

## 2024-04-18 DIAGNOSIS — E11.9 DM2 (DIABETES MELLITUS, TYPE 2) (H): ICD-10-CM

## 2024-04-18 RX ORDER — TRAZODONE HYDROCHLORIDE 100 MG/1
TABLET ORAL
Qty: 90 TABLET | Refills: 2 | Status: SHIPPED | OUTPATIENT
Start: 2024-04-18 | End: 2024-08-18

## 2024-04-20 DIAGNOSIS — Z76.0 ENCOUNTER FOR MEDICATION REFILL: ICD-10-CM

## 2024-04-20 DIAGNOSIS — F02.80 LEWY BODY DEMENTIA (H): ICD-10-CM

## 2024-04-20 DIAGNOSIS — G31.83 LEWY BODY DEMENTIA (H): ICD-10-CM

## 2024-04-22 RX ORDER — PREGABALIN 50 MG/1
CAPSULE ORAL
Qty: 90 CAPSULE | Refills: 1 | Status: SHIPPED | OUTPATIENT
Start: 2024-04-22 | End: 2024-08-19

## 2024-04-22 RX ORDER — DONEPEZIL HYDROCHLORIDE 5 MG/1
TABLET, FILM COATED ORAL
Qty: 90 TABLET | Refills: 2 | Status: SHIPPED | OUTPATIENT
Start: 2024-04-22

## 2024-05-08 ENCOUNTER — TELEPHONE (OUTPATIENT)
Dept: FAMILY MEDICINE | Facility: CLINIC | Age: 84
End: 2024-05-08
Payer: COMMERCIAL

## 2024-05-08 NOTE — TELEPHONE ENCOUNTER
Forms/Letter Request    Type of form/letter: OTHER: Physician's Statement in Support of Guardianship/ Conservatorship Form       Do we have the form/letter: Yes: JL    Who is the form from? [facility] [employer](if other please explain)    Where did/will the form come from? form was faxed in    When is form/letter needed by: asap    How would you like the form/letter returned: Fax : [phone]

## 2024-05-15 NOTE — TELEPHONE ENCOUNTER
Form was received in today blue folder. Form successfully fax to [phone]. No further action needed.

## 2024-05-21 DIAGNOSIS — F33.1 MODERATE EPISODE OF RECURRENT MAJOR DEPRESSIVE DISORDER (H): ICD-10-CM

## 2024-05-22 RX ORDER — FLUOXETINE 10 MG/1
CAPSULE ORAL
Qty: 90 CAPSULE | Refills: 3 | Status: SHIPPED | OUTPATIENT
Start: 2024-05-22 | End: 2024-08-18

## 2024-07-10 DIAGNOSIS — E11.9 TYPE 2 DIABETES MELLITUS (H): ICD-10-CM

## 2024-07-10 DIAGNOSIS — Z76.0 ENCOUNTER FOR MEDICATION REFILL: ICD-10-CM

## 2024-07-10 RX ORDER — BLOOD SUGAR DIAGNOSTIC
STRIP MISCELLANEOUS
Qty: 100 STRIP | Refills: 2 | Status: SHIPPED | OUTPATIENT
Start: 2024-07-10

## 2024-08-05 ENCOUNTER — DOCUMENTATION ONLY (OUTPATIENT)
Dept: OTHER | Facility: CLINIC | Age: 84
End: 2024-08-05
Payer: COMMERCIAL

## 2024-08-05 ENCOUNTER — TELEPHONE (OUTPATIENT)
Dept: FAMILY MEDICINE | Facility: CLINIC | Age: 84
End: 2024-08-05
Payer: COMMERCIAL

## 2024-08-05 NOTE — TELEPHONE ENCOUNTER
Forms/Letter Request    Type of form/letter: OTHER: [facility] Physician Order request forms       Do we have the form/letter: Yes: Incoming rightfax bin for Dr. CLIFTON    Who is the form from? [facility] [place] (if other please explain)    Where did/will the form come from? form was faxed in    When is form/letter needed by: Prior to 8/8/2024    How would you like the form/letter returned: Fax : [phone]

## 2024-08-07 ENCOUNTER — MEDICAL CORRESPONDENCE (OUTPATIENT)
Dept: HEALTH INFORMATION MANAGEMENT | Facility: CLINIC | Age: 84
End: 2024-08-07
Payer: COMMERCIAL

## 2024-08-07 ENCOUNTER — DOCUMENTATION ONLY (OUTPATIENT)
Dept: OTHER | Facility: CLINIC | Age: 84
End: 2024-08-07
Payer: COMMERCIAL

## 2024-08-16 DIAGNOSIS — F33.1 MODERATE EPISODE OF RECURRENT MAJOR DEPRESSIVE DISORDER (H): ICD-10-CM

## 2024-08-16 DIAGNOSIS — E11.69 TYPE 2 DIABETES MELLITUS WITH OTHER SPECIFIED COMPLICATION, WITHOUT LONG-TERM CURRENT USE OF INSULIN (H): ICD-10-CM

## 2024-08-16 DIAGNOSIS — G47.00 INSOMNIA: ICD-10-CM

## 2024-08-16 DIAGNOSIS — Z76.0 ENCOUNTER FOR MEDICATION REFILL: ICD-10-CM

## 2024-08-16 DIAGNOSIS — E11.9 DM2 (DIABETES MELLITUS, TYPE 2) (H): ICD-10-CM

## 2024-08-18 RX ORDER — GLIPIZIDE 5 MG/1
5 TABLET, FILM COATED, EXTENDED RELEASE ORAL DAILY
Qty: 14 TABLET | Refills: 10 | OUTPATIENT
Start: 2024-08-18

## 2024-08-18 RX ORDER — TRAZODONE HYDROCHLORIDE 100 MG/1
100 TABLET ORAL
Qty: 14 TABLET | Refills: 10 | OUTPATIENT
Start: 2024-08-18

## 2024-08-18 RX ORDER — GLIPIZIDE 5 MG/1
5 TABLET, FILM COATED, EXTENDED RELEASE ORAL DAILY
Qty: 90 TABLET | Refills: 1 | Status: SHIPPED | OUTPATIENT
Start: 2024-08-18

## 2024-08-18 RX ORDER — FLUOXETINE 10 MG/1
10 CAPSULE ORAL DAILY
Qty: 14 CAPSULE | Refills: 10 | OUTPATIENT
Start: 2024-08-18

## 2024-08-18 RX ORDER — FLUOXETINE 10 MG/1
10 CAPSULE ORAL DAILY
Qty: 90 CAPSULE | Refills: 1 | Status: SHIPPED | OUTPATIENT
Start: 2024-08-18

## 2024-08-19 RX ORDER — ASPIRIN 81 MG/1
81 TABLET, CHEWABLE ORAL DAILY
Qty: 14 TABLET | Refills: 10 | Status: SHIPPED | OUTPATIENT
Start: 2024-08-19

## 2024-08-19 RX ORDER — PREGABALIN 50 MG/1
50 CAPSULE ORAL AT BEDTIME
Qty: 0.001 CAPSULE | Refills: 4 | Status: SHIPPED | OUTPATIENT
Start: 2024-08-19

## 2024-08-19 RX ORDER — TRAZODONE HYDROCHLORIDE 100 MG/1
100 TABLET ORAL AT BEDTIME
Qty: 90 TABLET | Refills: 0 | Status: SHIPPED | OUTPATIENT
Start: 2024-08-19

## 2024-08-19 RX ORDER — CETIRIZINE HYDROCHLORIDE 10 MG/1
10 TABLET ORAL DAILY
Qty: 14 TABLET | Refills: 10 | Status: SHIPPED | OUTPATIENT
Start: 2024-08-19

## 2024-08-19 RX ORDER — MULTIVIT-MIN/FA/LYCOPEN/LUTEIN .4-300-25
1 TABLET ORAL DAILY
Qty: 14 TABLET | Refills: 10 | Status: SHIPPED | OUTPATIENT
Start: 2024-08-19

## 2024-08-19 NOTE — TELEPHONE ENCOUNTER
1st attempt----Called patient, unable to reach patient, left voicemail. Please relay Refill nurse message if patient calls back. Thank you.       Kojo Lim, MSN, RN   [facility]     
Clinic RN: Please contact patient because the medication is listed as historical or discontinued. Confirm patient is taking this medication. Document findings and route refill encounter to provider for approval or denial.     ALL HISTORICAL EXCEPT LYRICA.        LYRICA ROUTED TO PROVIDER.    
Deepali patient daughter  returns call. Writer relay RN/provider's message below. Deepali confirmed Patient is still taking Lyrica. Dr. Rojas send refill for Lyrica to [facility] today.     Mica Gonzalez,   [facility]  August 19, 2024 2:58 PM      
Pt presents to ED with mother c/o suicide attempt. Pt's mother reports she spoke with his therapist who recommended coming to the ED for evaluation. Pt has superficial abrasions on his right forearm that he reports were a suicide attempt with a razor blade. Pt denies prior suicide attempts. Pt's mother states "he is under a lot of pressure in school with all his AP classes". Pt denies auditory, visual and tactile hallucinations. Pt's mother denies psych medications but reports pt is under the care of a therapist. Andrey, Psych NP notified of pt's arrival to ED. Charge RN notified, pt to go through intake for orders.

## 2024-12-08 ENCOUNTER — HEALTH MAINTENANCE LETTER (OUTPATIENT)
Age: 84
End: 2024-12-08

## 2025-02-22 ENCOUNTER — APPOINTMENT (OUTPATIENT)
Dept: MRI IMAGING | Facility: HOSPITAL | Age: 85
DRG: 603 | End: 2025-02-22
Attending: STUDENT IN AN ORGANIZED HEALTH CARE EDUCATION/TRAINING PROGRAM
Payer: COMMERCIAL

## 2025-02-22 ENCOUNTER — APPOINTMENT (OUTPATIENT)
Dept: RADIOLOGY | Facility: HOSPITAL | Age: 85
DRG: 603 | End: 2025-02-22
Attending: STUDENT IN AN ORGANIZED HEALTH CARE EDUCATION/TRAINING PROGRAM
Payer: COMMERCIAL

## 2025-02-22 ENCOUNTER — HOSPITAL ENCOUNTER (INPATIENT)
Facility: HOSPITAL | Age: 85
End: 2025-02-22
Attending: STUDENT IN AN ORGANIZED HEALTH CARE EDUCATION/TRAINING PROGRAM | Admitting: STUDENT IN AN ORGANIZED HEALTH CARE EDUCATION/TRAINING PROGRAM
Payer: COMMERCIAL

## 2025-02-22 ENCOUNTER — APPOINTMENT (OUTPATIENT)
Dept: CT IMAGING | Facility: HOSPITAL | Age: 85
DRG: 603 | End: 2025-02-22
Payer: COMMERCIAL

## 2025-02-22 DIAGNOSIS — L03.114 CELLULITIS OF LEFT HAND: ICD-10-CM

## 2025-02-22 DIAGNOSIS — L24.9 IRRITANT CONTACT DERMATITIS, UNSPECIFIED TRIGGER: ICD-10-CM

## 2025-02-22 DIAGNOSIS — F02.818 LEWY BODY DEMENTIA WITH BEHAVIORAL DISTURBANCE (H): ICD-10-CM

## 2025-02-22 DIAGNOSIS — M15.0 PRIMARY OSTEOARTHRITIS INVOLVING MULTIPLE JOINTS: ICD-10-CM

## 2025-02-22 DIAGNOSIS — E11.69 TYPE 2 DIABETES MELLITUS WITH OTHER SPECIFIED COMPLICATION, WITHOUT LONG-TERM CURRENT USE OF INSULIN (H): Primary | ICD-10-CM

## 2025-02-22 DIAGNOSIS — G31.83 LEWY BODY DEMENTIA WITH BEHAVIORAL DISTURBANCE (H): ICD-10-CM

## 2025-02-22 LAB
ALBUMIN SERPL BCG-MCNC: 3.5 G/DL (ref 3.5–5.2)
ALP SERPL-CCNC: 70 U/L (ref 40–150)
ALT SERPL W P-5'-P-CCNC: 20 U/L (ref 0–70)
ANION GAP SERPL CALCULATED.3IONS-SCNC: 9 MMOL/L (ref 7–15)
AST SERPL W P-5'-P-CCNC: 24 U/L (ref 0–45)
BASOPHILS # BLD AUTO: 0 10E3/UL (ref 0–0.2)
BASOPHILS NFR BLD AUTO: 0 %
BILIRUB SERPL-MCNC: 0.5 MG/DL
BUN SERPL-MCNC: 36.6 MG/DL (ref 8–23)
CALCIUM SERPL-MCNC: 9.4 MG/DL (ref 8.8–10.4)
CHLORIDE SERPL-SCNC: 99 MMOL/L (ref 98–107)
CREAT SERPL-MCNC: 1.44 MG/DL (ref 0.67–1.17)
CRP SERPL-MCNC: 16 MG/L
EGFRCR SERPLBLD CKD-EPI 2021: 48 ML/MIN/1.73M2
EOSINOPHIL # BLD AUTO: 0 10E3/UL (ref 0–0.7)
EOSINOPHIL NFR BLD AUTO: 0 %
ERYTHROCYTE [DISTWIDTH] IN BLOOD BY AUTOMATED COUNT: 13.2 % (ref 10–15)
ERYTHROCYTE [SEDIMENTATION RATE] IN BLOOD BY WESTERGREN METHOD: 20 MM/HR (ref 0–20)
EST. AVERAGE GLUCOSE BLD GHB EST-MCNC: 214 MG/DL
GLUCOSE BLDC GLUCOMTR-MCNC: 175 MG/DL (ref 70–99)
GLUCOSE BLDC GLUCOMTR-MCNC: 227 MG/DL (ref 70–99)
GLUCOSE SERPL-MCNC: 262 MG/DL (ref 70–99)
HBA1C MFR BLD: 9.1 %
HCO3 SERPL-SCNC: 27 MMOL/L (ref 22–29)
HCT VFR BLD AUTO: 43.3 % (ref 40–53)
HGB BLD-MCNC: 14.2 G/DL (ref 13.3–17.7)
IMM GRANULOCYTES # BLD: 0.1 10E3/UL
IMM GRANULOCYTES NFR BLD: 1 %
LACTATE SERPL-SCNC: 2.2 MMOL/L (ref 0.7–2)
LACTATE SERPL-SCNC: 2.5 MMOL/L (ref 0.7–2)
LYMPHOCYTES # BLD AUTO: 0.9 10E3/UL (ref 0.8–5.3)
LYMPHOCYTES NFR BLD AUTO: 8 %
MCH RBC QN AUTO: 31.7 PG (ref 26.5–33)
MCHC RBC AUTO-ENTMCNC: 32.8 G/DL (ref 31.5–36.5)
MCV RBC AUTO: 97 FL (ref 78–100)
MONOCYTES # BLD AUTO: 1.2 10E3/UL (ref 0–1.3)
MONOCYTES NFR BLD AUTO: 11 %
NEUTROPHILS # BLD AUTO: 8.4 10E3/UL (ref 1.6–8.3)
NEUTROPHILS NFR BLD AUTO: 79 %
NRBC # BLD AUTO: 0 10E3/UL
NRBC BLD AUTO-RTO: 0 /100
PLATELET # BLD AUTO: 216 10E3/UL (ref 150–450)
POTASSIUM SERPL-SCNC: 4.7 MMOL/L (ref 3.4–5.3)
PROT SERPL-MCNC: 6.6 G/DL (ref 6.4–8.3)
RBC # BLD AUTO: 4.48 10E6/UL (ref 4.4–5.9)
SODIUM SERPL-SCNC: 135 MMOL/L (ref 135–145)
URATE SERPL-MCNC: 5.4 MG/DL (ref 3.4–7)
WBC # BLD AUTO: 10.6 10E3/UL (ref 4–11)

## 2025-02-22 PROCEDURE — 96366 THER/PROPH/DIAG IV INF ADDON: CPT

## 2025-02-22 PROCEDURE — 73201 CT UPPER EXTREMITY W/DYE: CPT | Mod: LT

## 2025-02-22 PROCEDURE — 73090 X-RAY EXAM OF FOREARM: CPT | Mod: LT

## 2025-02-22 PROCEDURE — 250N000011 HC RX IP 250 OP 636

## 2025-02-22 PROCEDURE — 258N000003 HC RX IP 258 OP 636: Performed by: STUDENT IN AN ORGANIZED HEALTH CARE EDUCATION/TRAINING PROGRAM

## 2025-02-22 PROCEDURE — 96368 THER/DIAG CONCURRENT INF: CPT

## 2025-02-22 PROCEDURE — 86140 C-REACTIVE PROTEIN: CPT | Performed by: STUDENT IN AN ORGANIZED HEALTH CARE EDUCATION/TRAINING PROGRAM

## 2025-02-22 PROCEDURE — 83605 ASSAY OF LACTIC ACID: CPT | Performed by: STUDENT IN AN ORGANIZED HEALTH CARE EDUCATION/TRAINING PROGRAM

## 2025-02-22 PROCEDURE — 85004 AUTOMATED DIFF WBC COUNT: CPT | Performed by: STUDENT IN AN ORGANIZED HEALTH CARE EDUCATION/TRAINING PROGRAM

## 2025-02-22 PROCEDURE — 87040 BLOOD CULTURE FOR BACTERIA: CPT | Performed by: STUDENT IN AN ORGANIZED HEALTH CARE EDUCATION/TRAINING PROGRAM

## 2025-02-22 PROCEDURE — 250N000013 HC RX MED GY IP 250 OP 250 PS 637

## 2025-02-22 PROCEDURE — 96365 THER/PROPH/DIAG IV INF INIT: CPT | Mod: 59

## 2025-02-22 PROCEDURE — 99223 1ST HOSP IP/OBS HIGH 75: CPT | Mod: AI

## 2025-02-22 PROCEDURE — 73130 X-RAY EXAM OF HAND: CPT | Mod: LT

## 2025-02-22 PROCEDURE — 73218 MRI UPPER EXTREMITY W/O DYE: CPT | Mod: LT

## 2025-02-22 PROCEDURE — 258N000003 HC RX IP 258 OP 636

## 2025-02-22 PROCEDURE — 99285 EMERGENCY DEPT VISIT HI MDM: CPT | Mod: 25

## 2025-02-22 PROCEDURE — 85652 RBC SED RATE AUTOMATED: CPT | Performed by: STUDENT IN AN ORGANIZED HEALTH CARE EDUCATION/TRAINING PROGRAM

## 2025-02-22 PROCEDURE — 84155 ASSAY OF PROTEIN SERUM: CPT | Performed by: STUDENT IN AN ORGANIZED HEALTH CARE EDUCATION/TRAINING PROGRAM

## 2025-02-22 PROCEDURE — 120N000001 HC R&B MED SURG/OB

## 2025-02-22 PROCEDURE — 84550 ASSAY OF BLOOD/URIC ACID: CPT

## 2025-02-22 PROCEDURE — 36415 COLL VENOUS BLD VENIPUNCTURE: CPT | Performed by: STUDENT IN AN ORGANIZED HEALTH CARE EDUCATION/TRAINING PROGRAM

## 2025-02-22 PROCEDURE — 83036 HEMOGLOBIN GLYCOSYLATED A1C: CPT

## 2025-02-22 PROCEDURE — 250N000011 HC RX IP 250 OP 636: Performed by: STUDENT IN AN ORGANIZED HEALTH CARE EDUCATION/TRAINING PROGRAM

## 2025-02-22 PROCEDURE — 82947 ASSAY GLUCOSE BLOOD QUANT: CPT | Performed by: STUDENT IN AN ORGANIZED HEALTH CARE EDUCATION/TRAINING PROGRAM

## 2025-02-22 PROCEDURE — 250N000012 HC RX MED GY IP 250 OP 636 PS 637

## 2025-02-22 RX ORDER — TAMSULOSIN HYDROCHLORIDE 0.4 MG/1
0.4 CAPSULE ORAL DAILY
Status: DISCONTINUED | OUTPATIENT
Start: 2025-02-23 | End: 2025-02-24

## 2025-02-22 RX ORDER — DONEPEZIL HYDROCHLORIDE 5 MG/1
5 TABLET, FILM COATED ORAL AT BEDTIME
Status: DISPENSED | OUTPATIENT
Start: 2025-02-22

## 2025-02-22 RX ORDER — NICOTINE POLACRILEX 4 MG
15-30 LOZENGE BUCCAL
Status: ACTIVE | OUTPATIENT
Start: 2025-02-22

## 2025-02-22 RX ORDER — PREGABALIN 50 MG/1
50 CAPSULE ORAL 2 TIMES DAILY
Status: DISPENSED | OUTPATIENT
Start: 2025-02-22

## 2025-02-22 RX ORDER — ACETAMINOPHEN 325 MG/1
650 TABLET ORAL 3 TIMES DAILY
Status: DISPENSED | OUTPATIENT
Start: 2025-02-22

## 2025-02-22 RX ORDER — TRAZODONE HYDROCHLORIDE 50 MG/1
100 TABLET ORAL AT BEDTIME
Status: DISCONTINUED | OUTPATIENT
Start: 2025-02-22 | End: 2025-02-24

## 2025-02-22 RX ORDER — AMOXICILLIN 250 MG
2 CAPSULE ORAL 2 TIMES DAILY PRN
Status: ACTIVE | OUTPATIENT
Start: 2025-02-22

## 2025-02-22 RX ORDER — ACETAMINOPHEN 500 MG
1000 TABLET ORAL 2 TIMES DAILY
Status: ON HOLD | COMMUNITY

## 2025-02-22 RX ORDER — CEPHALEXIN 500 MG/1
500 CAPSULE ORAL 3 TIMES DAILY
Status: ON HOLD | COMMUNITY
Start: 2025-02-18 | End: 2025-02-28

## 2025-02-22 RX ORDER — ENOXAPARIN SODIUM 100 MG/ML
40 INJECTION SUBCUTANEOUS EVERY 24 HOURS
Status: DISPENSED | OUTPATIENT
Start: 2025-02-22

## 2025-02-22 RX ORDER — METOPROLOL SUCCINATE 25 MG/1
25 TABLET, EXTENDED RELEASE ORAL DAILY
Status: DISPENSED | OUTPATIENT
Start: 2025-02-23

## 2025-02-22 RX ORDER — LIDOCAINE 40 MG/G
CREAM TOPICAL
Status: ACTIVE | OUTPATIENT
Start: 2025-02-22

## 2025-02-22 RX ORDER — SODIUM CHLORIDE 9 MG/ML
INJECTION, SOLUTION INTRAVENOUS CONTINUOUS
Status: DISCONTINUED | OUTPATIENT
Start: 2025-02-22 | End: 2025-02-24

## 2025-02-22 RX ORDER — QUETIAPINE FUMARATE 25 MG/1
25 TABLET, FILM COATED ORAL DAILY PRN
Status: DISCONTINUED | OUTPATIENT
Start: 2025-02-22 | End: 2025-02-24

## 2025-02-22 RX ORDER — PIPERACILLIN SODIUM, TAZOBACTAM SODIUM 3; .375 G/15ML; G/15ML
3.38 INJECTION, POWDER, LYOPHILIZED, FOR SOLUTION INTRAVENOUS ONCE
Status: COMPLETED | OUTPATIENT
Start: 2025-02-22 | End: 2025-02-22

## 2025-02-22 RX ORDER — GLIPIZIDE 10 MG/1
10 TABLET, FILM COATED, EXTENDED RELEASE ORAL DAILY
Status: ON HOLD | COMMUNITY
Start: 2025-02-13

## 2025-02-22 RX ORDER — DEXTROSE MONOHYDRATE 25 G/50ML
25-50 INJECTION, SOLUTION INTRAVENOUS
Status: ACTIVE | OUTPATIENT
Start: 2025-02-22

## 2025-02-22 RX ORDER — PIPERACILLIN SODIUM, TAZOBACTAM SODIUM 3; .375 G/15ML; G/15ML
3.38 INJECTION, POWDER, LYOPHILIZED, FOR SOLUTION INTRAVENOUS EVERY 8 HOURS
Status: DISCONTINUED | OUTPATIENT
Start: 2025-02-22 | End: 2025-02-26

## 2025-02-22 RX ORDER — ACETAMINOPHEN 650 MG/1
650 SUPPOSITORY RECTAL 3 TIMES DAILY
Status: DISPENSED | OUTPATIENT
Start: 2025-02-22

## 2025-02-22 RX ORDER — HALOPERIDOL 5 MG/ML
2 INJECTION INTRAMUSCULAR ONCE
Status: COMPLETED | OUTPATIENT
Start: 2025-02-22 | End: 2025-02-22

## 2025-02-22 RX ORDER — FINASTERIDE 5 MG/1
5 TABLET, FILM COATED ORAL DAILY
Status: DISPENSED | OUTPATIENT
Start: 2025-02-23

## 2025-02-22 RX ORDER — AMOXICILLIN 250 MG
1 CAPSULE ORAL 2 TIMES DAILY PRN
Status: ACTIVE | OUTPATIENT
Start: 2025-02-22

## 2025-02-22 RX ORDER — CALCIUM CARBONATE 500 MG/1
1000 TABLET, CHEWABLE ORAL 4 TIMES DAILY PRN
Status: ACTIVE | OUTPATIENT
Start: 2025-02-22

## 2025-02-22 RX ORDER — FLUOXETINE 10 MG/1
10 CAPSULE ORAL DAILY
Status: DISPENSED | OUTPATIENT
Start: 2025-02-23

## 2025-02-22 RX ORDER — ASPIRIN 81 MG/1
81 TABLET, CHEWABLE ORAL DAILY
Status: DISPENSED | OUTPATIENT
Start: 2025-02-23

## 2025-02-22 RX ORDER — OLANZAPINE 10 MG/2ML
5 INJECTION, POWDER, FOR SOLUTION INTRAMUSCULAR DAILY PRN
Status: DISCONTINUED | OUTPATIENT
Start: 2025-02-22 | End: 2025-02-22

## 2025-02-22 RX ORDER — IOPAMIDOL 755 MG/ML
75 INJECTION, SOLUTION INTRAVASCULAR ONCE
Status: COMPLETED | OUTPATIENT
Start: 2025-02-22 | End: 2025-02-22

## 2025-02-22 RX ORDER — LORAZEPAM 2 MG/ML
1 INJECTION INTRAMUSCULAR ONCE
Status: COMPLETED | OUTPATIENT
Start: 2025-02-22 | End: 2025-02-22

## 2025-02-22 RX ORDER — CETIRIZINE HYDROCHLORIDE 10 MG/1
10 TABLET ORAL DAILY
Status: DISPENSED | OUTPATIENT
Start: 2025-02-23

## 2025-02-22 RX ORDER — PREGABALIN 50 MG/1
50 CAPSULE ORAL 2 TIMES DAILY
Status: ON HOLD | COMMUNITY

## 2025-02-22 RX ADMIN — SODIUM CHLORIDE: 0.9 INJECTION, SOLUTION INTRAVENOUS at 18:20

## 2025-02-22 RX ADMIN — SODIUM CHLORIDE 500 ML: 0.9 INJECTION, SOLUTION INTRAVENOUS at 13:10

## 2025-02-22 RX ADMIN — TRAZODONE HYDROCHLORIDE 100 MG: 50 TABLET ORAL at 21:36

## 2025-02-22 RX ADMIN — PIPERACILLIN AND TAZOBACTAM 3.38 G: 3; .375 INJECTION, POWDER, FOR SOLUTION INTRAVENOUS at 10:10

## 2025-02-22 RX ADMIN — LORAZEPAM 1 MG: 2 INJECTION INTRAMUSCULAR; INTRAVENOUS at 12:25

## 2025-02-22 RX ADMIN — ACETAMINOPHEN 325 MG: 325 TABLET ORAL at 21:48

## 2025-02-22 RX ADMIN — ENOXAPARIN SODIUM 40 MG: 40 INJECTION SUBCUTANEOUS at 18:30

## 2025-02-22 RX ADMIN — SODIUM CHLORIDE 2000 MG: 0.9 INJECTION, SOLUTION INTRAVENOUS at 10:14

## 2025-02-22 RX ADMIN — PREGABALIN 50 MG: 50 CAPSULE ORAL at 21:36

## 2025-02-22 RX ADMIN — PIPERACILLIN AND TAZOBACTAM 3.38 G: 3; .375 INJECTION, POWDER, FOR SOLUTION INTRAVENOUS at 18:26

## 2025-02-22 RX ADMIN — IOPAMIDOL 75 ML: 755 INJECTION, SOLUTION INTRAVENOUS at 16:10

## 2025-02-22 RX ADMIN — DONEPEZIL HYDROCHLORIDE 5 MG: 5 TABLET ORAL at 21:36

## 2025-02-22 RX ADMIN — INSULIN ASPART 1 UNITS: 100 INJECTION, SOLUTION INTRAVENOUS; SUBCUTANEOUS at 18:29

## 2025-02-22 RX ADMIN — HALOPERIDOL LACTATE 2 MG: 5 INJECTION, SOLUTION INTRAMUSCULAR at 14:25

## 2025-02-22 RX ADMIN — ACETAMINOPHEN 650 MG: 325 TABLET ORAL at 18:23

## 2025-02-22 ASSESSMENT — ACTIVITIES OF DAILY LIVING (ADL)
ADLS_ACUITY_SCORE: 41
ADLS_ACUITY_SCORE: 50
ADLS_ACUITY_SCORE: 41

## 2025-02-22 ASSESSMENT — COLUMBIA-SUICIDE SEVERITY RATING SCALE - C-SSRS: IS THE PATIENT NOT ABLE TO COMPLETE C-SSRS: UNABLE TO VERBALIZE

## 2025-02-22 NOTE — PHARMACY-VANCOMYCIN DOSING SERVICE
"Pharmacy Vancomycin Note  Date of Service 2025  Patient's  1940   84 year old, male    Indication: Skin and Soft Tissue Infection  Day of Therapy: 1  Current vancomycin monitoring method: AUC  Current vancomycin therapeutic monitoring goal: 400-600 mg*h/L    InsightRX Prediction of Current Vancomycin Regimen  Loading dose: N/A  Regimen: 1250 mg IV every 24 hours.  Start time: 10:14 on 2025  Exposure target: AUC24 (range)400-600 mg/L.hr   AUC24,ss: 521 mg/L.hr  Probability of AUC24 > 400: 77 %  Ctrough,ss: 16.7 mg/L  Probability of Ctrough,ss > 20: 34 %  Probability of nephrotoxicity (Lodise TYRESE ): 12 %    Current estimated CrCl = Estimated Creatinine Clearance: 45.9 mL/min (A) (based on SCr of 1.44 mg/dL (H)).    Creatinine for last 3 days  2025:  9:27 AM Creatinine 1.44 mg/dL    Recent Vancomycin Levels (past 3 days)  No results found for requested labs within last 3 days.    Vancomycin IV Administrations (past 72 hours)                     vancomycin (VANCOCIN) 2,000 mg in 0.9% NaCl 520 mL intermittent infusion (mg) 2,000 mg New Bag 25 1014                    Nephrotoxins and other renal medications (From now, onward)      Start     Dose/Rate Route Frequency Ordered Stop    25 1600  piperacillin-tazobactam (ZOSYN) 3.375 g vial to attach to  mL bag        Note to Pharmacy: For [facility], [facility] and [facility]: For Zosyn-naive patients, use the \"Zosyn initial dose + extended infusion\" order panel.    3.375 g  over 240 Minutes Intravenous EVERY 8 HOURS 25 1454                 Contrast Orders - past 72 hours (72h ago, onward)      None          InsightRX Prediction of Planned New Vancomycin Regimen  Loading dose: N/A  Regimen: 1250 mg IV every 24 hours.  Start time: 10:14 on 2025  Exposure target: AUC24 (range)400-600 mg/L.hr   AUC24,ss: 521 mg/L.hr  Probability of AUC24 > 400: 77 %  Ctrough,ss: 16.7 mg/L  Probability of Ctrough,ss > 20: 34 %  Probability of " nephrotoxicity (Lodise TYRESE 2009): 12 %    Plan:  Will start vancomycin 1250 mg IV q24h.  Vancomycin monitoring method: AUC  Vancomycin therapeutic monitoring goal: 400-600 mg*h/L  Pharmacy will check vancomycin levels as appropriate in 1-3 Days.  Serum creatinine levels will be ordered daily for the first week of therapy and at least twice weekly for subsequent weeks.    Nesha Morrison RPH

## 2025-02-22 NOTE — ED NOTES
[facility] ED Handoff Report    ED Chief Complaint: hand and wrist pain. Swelling     ED Diagnosis:  (L03.114) Cellulitis of left hand  Comment:   Plan:        PMH:    Past Medical History:   Diagnosis Date    Aortic stenosis     BPH (benign prostatic hyperplasia)     Chronic back pain     Chronic kidney disease     Degenerative joint disease     Dementia (H)     Depression     Diabetes mellitus (H)     type 2    Esophageal reflux     Fatigue     Hyperlipidemia     Hypertension     Left ventricular hypertrophy     Lumbar radiculopathy     Male erectile disorder     Neurocognitive disorder 7/20/2014    Obesity     ZAKIA on CPAP     machine broke at present    Short-term memory loss         Code Status:  Full Code     Falls Risk: Yes Band: yes    Current Living Situation/Residence: lives in a skilled nursing facility, memory care    Elimination Status: Continent: No     Activity Level: 2 assist    Patients Preferred Language:  English     Needed: No    Vital Signs:  BP (!) 147/70   Pulse 60   Temp 98.5  F (36.9  C) (Oral)   Resp 20   Wt 98.9 kg (218 lb)   SpO2 93%   BMI 31.28 kg/m       Cardiac Rhythm: NA    Pain Score: Patient is unable to quantify.    Is the Patient Confused:  Yes Where is the patient located? In room with door open and curtain open for direct visualization from nurses station    Last Food or Drink: 02/22/25 at breakfast    Focused Assessment:  Pt to ED with complaint of left hand swelling and pain. Started earlier in the week and pt was seen in urgent care. Rx antibiotics and discharge back to facility. Pt failed outpatient treatment. Today noted increase in swelling and pain of left hand. EMS to ED for continued treatment.   Unable to get MRI due to pt movement and inability to follow commands. Ativan administered in MRI with no success at keeping arm still during imaging.   Pts son reports pt is alert to self only at baseline. Son states pt frequently sleeps throughout the  "day.   During pts stay in the ED he woke from sleep and became very agitated and combative with staff. Pt kicked writer in stomach and was swinging closed fists at other staff in the room. Pt was threatening staff stating \"I'm going to kill you\" and \"ill hurt you\"  MD placed order for IV haldol to be administered. Pt continued to be verbally and physically aggressive towards staff until falling asleep.   Pt has been up several times at bedside to use urinal. Assist of two. Pt refusing medications at this time.     Tests Performed: Done: Labs and Imaging    Treatments Provided:      Family Dynamics/Concerns: No    Family Updated On Visitor Policy: Yes    Plan of Care Communicated to Family: Yes    Who Was Updated about Plan of Care: Pt son Bassem    Medications sent with patient:     Covid: asymptomatic , NA    Additional Information:     Francisco Javier Kumar RN 2/22/2025 4:11 PM       "

## 2025-02-22 NOTE — ED TRIAGE NOTES
Pt to ED via [facility] from [facility]. Staff noted swelling and redness of pts left hand and wrist today. Pt reporting pain to the area with touch.  Skin is warm to the touch at site  Per medics pt eating breakfast on their arrival. No known injury to the extremity.     Hx dementia. A/ox1 at baseline for self     Triage Assessment (Adult)       Row Name 02/22/25 0912          Triage Assessment    Airway WDL WDL        Respiratory WDL    Respiratory WDL WDL        Skin Circulation/Temperature WDL    Skin Circulation/Temperature WDL X  swelling and reddness of left hand into wrist. warm to the touch        Cardiac WDL    Cardiac WDL WDL        Peripheral/Neurovascular WDL    Peripheral Neurovascular WDL WDL        Cognitive/Neuro/Behavioral WDL    Cognitive/Neuro/Behavioral WDL WDL

## 2025-02-22 NOTE — ED NOTES
On arrival of pts son to the ED today, he states that the swelling and redness was noticed early last week. States that he was brought into Urgent Care on Tuesday and given an antibiotic. He was also told to wrap the extremity at that time. Pts son believes the swelling and redness is worse today than when he went in on Tuesday.

## 2025-02-22 NOTE — H&P
[facility]    History and Physical - Hospitalist Service       Date of Admission:  2/22/2025    Assessment & Plan      Nestor Peterson is a 84 year old male with past medical history of lewy body dementia, T2DM, HTN, BPH admitted on 2/22/2025 with likely cellulitis of the left hand/wrist.    Suspected cellulitis   Reported treatment for cellulitis this past week at an outside urgent care with oral cephalexin. Patient's son notes that over the last week, swelling, redness, and pain have slowly progressed. XR on admission negative for acute fracture or osteomyelitis. Unfortunately, MRI was non-diagnostic due to profound motion artifact due agitation of the patient. CT pending at this time. Overall vitals stable, labs remarkable for CRP of 16, lactic acid of 2.2. WBC normal.  No clear lesions, pustules, or draining abscess.   - Empiric vancomycin + zosyn  - follow blood cultures  - follow-up on CT left hand  - scheduled tylenol for pain control  - NS @ 100mL/hr     T2DM  Blood sugar on admission 262, most recent A1c of 9.7 over 6 months ago. PTA medications include 10 mg glipizide, 2000mg metformin daily.  - repeat A1c  - medium dose sliding scale insulin     Lewy-body dementia  Oriented to self only, has been persistently aggressive towards staff while admitted. Olanzapine relatively contraindicated in LBD.   - seroquel 25 mg nightly as needed        Diet:  Regular  DVT Prophylaxis: Enoxaparin (Lovenox) SQ  Rowe Catheter: Not present  Fluids: NS @ 100mL/hr  Lines: None     Cardiac Monitoring: None  Code Status:  Full    Clinically Significant Risk Factors Present on Admission                 # Drug Induced Platelet Defect: home medication list includes an antiplatelet medication   # Hypertension: Noted on problem list     # Dementia: noted on problem list                    Hong Carrington MD  Hospitalist Service  [facility]  Securely message with Vocera (more  info)  Text page via [facility] Paging/Directory   ______________________________________________________________________    Chief Complaint   Hand swelling/redness    History of Present Illness   Nestor Peterson is a 84 year old male who presents with worsening erythema and swelling of the left hand. Son reports he was seen at urgent care earlier in the week and prescribed Keflex but swelling, pain, and erythema slowly worsened over the past few days.       Past Medical History    Past Medical History:   Diagnosis Date    Aortic stenosis     BPH (benign prostatic hyperplasia)     Chronic back pain     Chronic kidney disease     Degenerative joint disease     Dementia (H)     Depression     Diabetes mellitus (H)     type 2    Esophageal reflux     Fatigue     Hyperlipidemia     Hypertension     Left ventricular hypertrophy     Lumbar radiculopathy     Male erectile disorder     Neurocognitive disorder 7/20/2014    Obesity     ZAKIA on CPAP     machine broke at present    Short-term memory loss        Past Surgical History   Past Surgical History:   Procedure Laterality Date    APPENDECTOMY      CARDIAC CATHETERIZATION  06/26/2014    CARDIAC SURGERY  07/2014    status post LIMA to LAD bypass and bioprosthetic aortic valve replacement in 07/2014    CATARACT EXTRACTION Bilateral     CATARACT IOL, RT/LT      CHOLECYSTECTOMY      HC KNEE SCOPE, DIAGNOSTIC      Description: Arthroscopy Knee Left;  Proc Date: 08/12/2010;  Comments: Dr Daily    HC REMOVAL GALLBLADDER      Description: Cholecystectomy;  Recorded: 12/17/2009;    IR LUMBAR EPIDURAL STEROID INJECTION  10/31/2005    IR LUMBAR EPIDURAL STEROID INJECTION  11/21/2005    IR LUMBAR EPIDURAL STEROID INJECTION  08/29/2007    IR LUMBAR EPIDURAL STEROID INJECTION  09/13/2007    ZC APPENDECTOMY      Description: Appendectomy;  Recorded: 12/17/2009;       Prior to Admission Medications   Prior to Admission Medications   Prescriptions Last Dose Informant Patient Reported? Taking?    FLUoxetine (PROZAC) 10 MG capsule   No No   Sig: Take 1 capsule (10 mg) by mouth daily   FOLIC ACID/MULTIVIT-MIN/LUTEIN (CENTRUM SILVER ORAL)   Yes No   Sig: [FOLIC ACID/MULTIVIT-MIN/LUTEIN (CENTRUM SILVER ORAL)] Take 1 tablet by mouth daily.   Lancets MISC   Yes No   Sig: [LANCETS MISC] Use As Directed.   Multiple Vitamins-Minerals (CERTAVITE SENIOR) TABS   No No   Sig: TAKE ONE TABLET BY MOUTH ONCE DAILY   aspirin (ASA) 81 MG chewable tablet   No No   Sig: TAKE ONE TABLET BY MOUTH ONCE DAILY   aspirin 81 MG tablet   Yes No   Sig: [ASPIRIN 81 MG TABLET] Take 81 mg by mouth daily.   blood glucose (ACCU-CHEK DAISHA PLUS) test strip   No No   Sig: USE TO TEST BLOOD SUGAR 1 TIMES DAILY OR AS DIRECTED.   cetirizine (ZYRTEC) 10 MG tablet   No No   Sig: TAKE ONE TABLET BY MOUTH ONCE DAILY   donepezil (ARICEPT) 5 MG tablet   No No   Sig: TAKE 1 TABLET BY MOUTH EVERYDAY AT BEDTIME   finasteride (PROSCAR) 5 MG tablet   No No   Sig: TAKE 1 TABLET BY MOUTH EVERY DAY   glipiZIDE (GLUCOTROL XL) 5 MG 24 hr tablet   No No   Sig: Take 1 tablet (5 mg) by mouth daily   metFORMIN (GLUCOPHAGE) 1000 MG tablet   No No   Sig: TAKE 1 TABLET BY MOUTH TWICE A DAY WITH MEALS   metoprolol succinate ER (TOPROL XL) 25 MG 24 hr tablet   No No   Sig: Take 1 tablet (25 mg) by mouth daily   pregabalin (LYRICA) 50 MG capsule   No No   Sig: TAKE ONE CAPSULE BY MOUTH AT BEDTIME   tamsulosin (FLOMAX) 0.4 MG capsule   No No   Sig: TAKE 1 CAPSULE BY MOUTH EVERY DAY   traZODone (DESYREL) 100 MG tablet   No No   Sig: Take 1 tablet (100 mg) by mouth at bedtime      Facility-Administered Medications: None           Physical Exam   Vital Signs: Temp: 98.5  F (36.9  C) Temp src: Oral BP: (!) 175/81 Pulse: 56   Resp: 20 SpO2: 95 % O2 Device: None (Room air)    Weight: 218 lbs 0 oz    GEN: Drowsy, non-cooperative on exam. No acute distress.  HEENT: Normocephalic, atraumatic.  NECK: Supple. No cervical or supraclavicular adenopathy.   PULM: Non-labored breathing.  No use of accessory muscles. Clear to ausculation bilaterally. No wheezes or crackles.   CV: Regular rate and rhythm. Normal S1, S2. No rubs, murmurs, or gallops.    ABDOMEN: Normoactive bowel sounds. Non-tender to palpation. Non-distended.    EXTREMITES:  Left hand with diffuse erythema and swelling. Mildly tender to palpation. Picture uploaded in ED note.   NEURO:  Awake. Oriented to person only.   PSYCH: Dementia.       Medical Decision Making             Data     I have personally reviewed the following data over the past 24 hrs:    10.6  \   14.2   / 216     135 99 36.6 (H) /  262 (H)   4.7 27 1.44 (H) \     ALT: 20 AST: 24 AP: 70 TBILI: 0.5   ALB: 3.5 TOT PROTEIN: 6.6 LIPASE: N/A     Procal: N/A CRP: 16.00 (H) Lactic Acid: 2.2 (H)         Imaging results reviewed over the past 24 hrs:   Recent Results (from the past 24 hours)   XR Hand Left G/E 3 Views    Narrative    EXAM: XR HAND LEFT G/E 3 VIEWS  LOCATION: [facility]  DATE: 2/22/2025    INDICATION: swelling, possible infection  COMPARISON: None.      Impression    IMPRESSION:     No acute fracture or dislocation. Mild degenerative changes scattered throughout the thumb CMC, STT, MCP, and interphalangeal joints.   Wrist chondrocalcinosis. Soft tissue swelling at the dorsal aspect of the hand. No radiographic evidence of osteomyelitis.   Radius/Ulna XR,  PA &LAT, left    Narrative    EXAM: XR FOREARM LEFT 2 VIEWS  LOCATION: [facility]  DATE: 2/22/2025    INDICATION: swelling, possible infection  COMPARISON: None.      Impression    IMPRESSION: No acute fracture or dislocation. Wrist chondrocalcinosis. Distal humerus lateral epicondylar chronic ossicle versus enthesophyte. Mild soft tissue swelling in the forearm.   MR Hand Left w/o Contrast    Narrative    MRI ATTEMPTED BUT FAILED    The exam is nondiagnostic, as the patient was unable to complete the examination. Patient should reschedule when clinically  appropriate.     Markedly motion degraded and off-center T2 fat sat axial, coronal and coronal PD images show soft tissue swelling along the visualized wrist and hand.    NOTE: ABNORMAL REPORT    THE DICTATION ABOVE DESCRIBES AN ABNORMALITY FOR WHICH FOLLOW-UP IS NEEDED.

## 2025-02-22 NOTE — ED NOTES
"Pt found attempting to get out of bed. Pt placed his legs through the bed rails. Pt became agitated with staff attempting to help him. Pt kicked writer and swung a closed fist several times at staff in the room. ED MD notified and order for haldol placed. Haldol administered. Pt continued to threaten staff stating \"I'm going to kill you.\"   Pt had lose of continence of urine. Underwear removed and pt assisted to use urinal at bedside. Pt helped back into bed. Pt continued to be combative and refused to follow commands  "

## 2025-02-22 NOTE — ED NOTES
[facility] ED Handoff Report    ED Chief Complaint: hand and wrist pain. Swelling     ED Diagnosis:  (L03.114) Cellulitis of left hand  Comment:   Plan:        PMH:    Past Medical History:   Diagnosis Date     Aortic stenosis      BPH (benign prostatic hyperplasia)      Chronic back pain      Chronic kidney disease      Degenerative joint disease      Dementia (H)      Depression      Diabetes mellitus (H)     type 2     Esophageal reflux      Fatigue      Hyperlipidemia      Hypertension      Left ventricular hypertrophy      Lumbar radiculopathy      Male erectile disorder      Neurocognitive disorder 7/20/2014     Obesity      ZAKIA on CPAP     machine broke at present     Short-term memory loss         Code Status:  Full Code     Falls Risk: Yes Band: yes    Current Living Situation/Residence: lives in a skilled nursing facility, memory care    Elimination Status: Continent: No     Activity Level: 2 assist    Patients Preferred Language:  English     Needed: No    Vital Signs:  BP (!) 147/70   Pulse 60   Temp 98.5  F (36.9  C) (Oral)   Resp 20   Wt 98.9 kg (218 lb)   SpO2 93%   BMI 31.28 kg/m       Cardiac Rhythm: NA    Pain Score: Patient is unable to quantify.    Is the Patient Confused:  Yes Where is the patient located? In room with door open and curtain open for direct visualization from nurses station    Last Food or Drink: 02/22/25 at breakfast    Focused Assessment:  Pt to ED with complaint of left hand pain and swelling. He was seen Tuesday at urgent care and diagnosed with cellulitis. Pt prescribed oral antibiotic and told to wrap hand at facility. Pt failed outpatient treatment. Today staff noted swelling was worse and sent pt to ED.   Pt alert to self only at baseline. Son reports that     Tests Performed: Done: Labs and Imaging    Treatments Provided:      Family Dynamics/Concerns: No    Family Updated On Visitor Policy: Yes    Plan of Care Communicated to Family: Yes    Who  Was Updated about Plan of Care: Pt son Bassem    Medications sent with patient:     Covid: asymptomatic , NA    Additional Information:     Francisco Javier Kumar, RN 2/22/2025 4:11 PM

## 2025-02-22 NOTE — PHARMACY-VANCOMYCIN DOSING SERVICE
Pharmacy Vancomycin Initial Note  Date of Service 2025  Patient's  1940  84 year old, male    Indication: Skin and Soft Tissue Infection    Current estimated CrCl = Estimated Creatinine Clearance: 49.1 mL/min (A) (based on SCr of 1.31 mg/dL (H)).    Creatinine for last 3 days  No results found for requested labs within last 3 days.    Recent Vancomycin Level(s) for last 3 days  No results found for requested labs within last 3 days.      Vancomycin IV Administrations (past 72 hours)        No vancomycin orders with administrations in past 72 hours.                    Nephrotoxins and other renal medications (From now, onward)      Start     Dose/Rate Route Frequency Ordered Stop    25 0930  piperacillin-tazobactam (ZOSYN) 3.375 g vial to attach to  mL bag         3.375 g  over 30 Minutes Intravenous ONCE 25 0921              Contrast Orders - past 72 hours (72h ago, onward)      None          Will load vancomycin 2000 mg IV (21.3 mg/kg) once in the ED. Please reconsult pharmacy if vancomycin is to continue.     Nesha Morrison, [facility]

## 2025-02-22 NOTE — ED NOTES
Bed: JNED-06  Expected date:   Expected time:   Means of arrival:   Comments:  [place], 84M, cellulitis

## 2025-02-22 NOTE — ED PROVIDER NOTES
NAME: Nestor Peterson  AGE: 84 year old male  YOB: 1940  MRN: [id number]  EVALUATION DATE & TIME: 2025  9:11 AM    PCP: Francisco Javier Rojas  ED PROVIDER: Corina Rasmussen MD.    Chief Complaint   Patient presents with    Hand Pain       FINAL IMPRESSION:  1. Cellulitis of left hand      MEDICAL DECISION MAKIN:12 AM I met with the patient, obtained history, performed an initial exam, and discussed options and plan for diagnostics and treatment here in the ED.   10:29 AM I spoke with Dr. Smart, [facility] hand surgery.  10:35 AM Placed in the transfer portal  10:37 AM I updated the patient. Spoke with the patient's son who had just arrived in the ED.   11:43 AM I have not heard from SOC. I called them, no beds in system appropriate for patient. Will admit here.  11:56 AM I spoke with Dr. Carrington, the [facility] hospitalist who accepts the patient for admission.   1:59 PM Updated [facility]    MDM: 84-year-old male with history of dementia, hypertension, hyperlipidemia, diabetes, among others who presents with hand pain.  Per patient's son he has had some swelling and pain to his left hand for the last week or so and he was seen at an urgent care where he was started on Keflex.  He was brought in today for worsening pain and now the swelling and redness is spread up some to his wrist.    Differential includes but not limited to cellulitis, abscess, septic joint, gout, fracture/dislocation among others.  No crepitance or rapid expansion here to suggest necrotizing infection.  X-ray with soft tissue swelling, no fracture or dislocation. Labs reviewed: WBC 10.6, ESR 20 (WNL), CRP 16. Started on vancomycin and zosyn. Hand surgery consulted, agree with plan for MRI to further evaluate. Admitted to the hospitalist team for further management (MRI pending at time of admission).    Addendum: Received a call from radiology that they are unable to interpret the MRI.  Patient did not tolerate  the MRI well despite IV Ativan.  I will update the feeling resident team and we will decide on trying to get this again (possibly at later time when more cooperative or with more meds) or pursuing CT instead    Addendum: 2:20 PM Patient agitated, kicking at nursing. Will try small dose of haldol (Qtc looked normal on last check)    Medical Decision Making  Obtained supplemental history:Supplemental history obtained?: EMS  Reviewed external records: External records reviewed?: No  Care impacted by chronic illness:Dementia  Did you consider but not order tests?: Work up considered but not performed and documented in chart, if applicable  Did you interpret images independently?: Independent interpretation of ECG and images noted in documentation, when applicable.  Consultation discussion with other provider:Did you involve another provider (consultant, , pharmacy, etc.)?: I discussed the care with another health care provider, see documentation for details.  Admit.    MIPS: Not Applicable    MEDICATIONS GIVEN IN THE EMERGENCY:  Medications   vancomycin (VANCOCIN) 2,000 mg in 0.9% NaCl 520 mL intermittent infusion (2,000 mg Intravenous $New Bag 2/22/25 1014)   sodium chloride 0.9% BOLUS 500 mL (has no administration in time range)   piperacillin-tazobactam (ZOSYN) 3.375 g vial to attach to  mL bag (0 g Intravenous Stopped 2/22/25 1117)       NEW PRESCRIPTIONS STARTED AT TODAY'S ER VISIT:  New Prescriptions    No medications on file        =================================================================  HPI    HPI limited due to history of dementia.     Patient information was obtained from: EMS, patient, son  Use of : N/A       Nestor Peterson is a 84 year old male with a past medical history of Alzheimer's dementia, T2DM, among others who presents to the ED by EMS for evaluation of left hand swelling and pain.     Per EMS, the patient arrives from memory care. Memory care staff noticed that the  patient's left hand was swollen today and as far as they know, his left hand was not swollen yesterday. Patient also endorses associated left hand pain. No recent trauma or medication changes.     Per the patient, he endorses pain and swelling to the left hand and denies any recent falls. He denies abdominal pain, chest pain and is not in any other pain. He has no other complaints at this time.    Per the patient's son who arrived to the ED, the patient was seen at a [facility] for this left hand redness and swelling within the past week and was started on Keflex. Son does think that the redness is spreading up his left wrist today. Patient is at his mental baseline.     PHYSICAL EXAM:    Vitals: BP (!) 175/81   Pulse 56   Temp 98.5  F (36.9  C) (Oral)   Resp 20   Wt 98.9 kg (218 lb)   SpO2 95%   BMI 31.28 kg/m     Constitutional: Well developed, well nourished. No acute distress  HENT: Normocephalic, atraumatic. Neck-gross ROM intact.   Eyes: Pupils mid-range, sclera white  Respiratory: CTAB, no respiratory distress  Cardiovascular: Normal heart rate, regular rhythm. Left radial pulse intact. LUE warm and well perfused  Musculoskeletal: See picture below. Swelling/redness/warmth to the dorsum of the left arm/proximal forearm, no crepitance or fluctuance, no obvious open wounds, limited ROM of fingers/wrist due to pain  Neurologic: Alert & oriented, speech clear, limited ROM to the left fingers/wrist but can squeeze hand, sensation intact in the hand        LAB:  All pertinent labs reviewed and interpreted.  Labs Ordered and Resulted from Time of ED Arrival to Time of ED Departure   COMPREHENSIVE METABOLIC PANEL - Abnormal       Result Value    Sodium 135      Potassium 4.7      Carbon Dioxide (CO2) 27      Anion Gap 9      Urea Nitrogen 36.6 (*)     Creatinine 1.44 (*)     GFR Estimate 48 (*)     Calcium 9.4      Chloride 99      Glucose 262 (*)     Alkaline Phosphatase 70      AST 24      ALT  20      Protein Total 6.6      Albumin 3.5      Bilirubin Total 0.5     LACTIC ACID WHOLE BLOOD WITH 1X REPEAT IN 2 HR WHEN >2 - Abnormal    Lactic Acid, Initial 2.5 (*)    CRP INFLAMMATION - Abnormal    CRP Inflammation 16.00 (*)    CBC WITH PLATELETS AND DIFFERENTIAL - Abnormal    WBC Count 10.6      RBC Count 4.48      Hemoglobin 14.2      Hematocrit 43.3      MCV 97      MCH 31.7      MCHC 32.8      RDW 13.2      Platelet Count 216      % Neutrophils 79      % Lymphocytes 8      % Monocytes 11      % Eosinophils 0      % Basophils 0      % Immature Granulocytes 1      NRBCs per 100 WBC 0      Absolute Neutrophils 8.4 (*)     Absolute Lymphocytes 0.9      Absolute Monocytes 1.2      Absolute Eosinophils 0.0      Absolute Basophils 0.0      Absolute Immature Granulocytes 0.1      Absolute NRBCs 0.0     ERYTHROCYTE SEDIMENTATION RATE AUTO - Normal    Erythrocyte Sedimentation Rate 20     LACTIC ACID WHOLE BLOOD   URIC ACID   BLOOD CULTURE   BLOOD CULTURE       RADIOLOGY:  Radius/Ulna XR,  PA &LAT, left   Final Result   IMPRESSION: No acute fracture or dislocation. Wrist chondrocalcinosis. Distal humerus lateral epicondylar chronic ossicle versus enthesophyte. Mild soft tissue swelling in the forearm.      XR Hand Left G/E 3 Views   Final Result   IMPRESSION:       No acute fracture or dislocation. Mild degenerative changes scattered throughout the thumb CMC, STT, MCP, and interphalangeal joints.    Wrist chondrocalcinosis. Soft tissue swelling at the dorsal aspect of the hand. No radiographic evidence of osteomyelitis.      MR Hand Left w/o & w Contrast    (Results Pending)       I, Jojo Licea, am serving as a scribe to document services personally performed by Dr. Corina Rasmussen based on my observation and the provider's statements to me. I, Corina Rasmussen MD attest that Jojo Licea is acting in a scribe capacity, has observed my performance of the services and has documented them in accordance with my  direction.    Corina Rasmussen M.D.  Emergency Medicine  [facility] EMERGENCY DEPARTMENT  [address] [address] [id number] [phone]  Dept: [phone]     Corina Rasmussen MD  02/22/25 1159       Corina Rasmussen MD  02/22/25 1323       Corina Rasmussen MD  02/22/25 6980       Corina Rasmussen MD  02/22/25 9642

## 2025-02-22 NOTE — PHARMACY-ADMISSION MEDICATION HISTORY
Pharmacy Intern Admission Medication History    Admission medication history is complete. The information provided in this note is only as accurate as the sources available at the time of the update.    Information Source(s): Facility ([facility] medication list/MAR via phone    Pertinent Information: MAR given verbally by on-call nurse ([phone])    Changes made to PTA medication list:  Added: Tylenol, cephalexin  Deleted: None  Changed: pregabalin, glipizide    Allergies reviewed with patient and updates made in EHR: yes    Medication History Completed By: ANITHA FIGUEROA 2/22/2025 2:05 PM    PTA Med List   Medication Sig Last Dose/Taking    acetaminophen (TYLENOL) 500 MG tablet Take 1,000 mg by mouth 2 times daily. 2/22/2025 at  8:00 AM    aspirin (ASA) 81 MG chewable tablet TAKE ONE TABLET BY MOUTH ONCE DAILY 2/22/2025 at  8:00 AM    cephALEXin (KEFLEX) 500 MG capsule Take 500 mg by mouth 3 times daily. 2/22/2025 at  8:00 AM    cetirizine (ZYRTEC) 10 MG tablet TAKE ONE TABLET BY MOUTH ONCE DAILY 2/22/2025 at  8:00 AM    donepezil (ARICEPT) 5 MG tablet TAKE 1 TABLET BY MOUTH EVERYDAY AT BEDTIME 2/21/2025 Bedtime    finasteride (PROSCAR) 5 MG tablet TAKE 1 TABLET BY MOUTH EVERY DAY 2/22/2025 at  8:00 AM    FLUoxetine (PROZAC) 10 MG capsule Take 1 capsule (10 mg) by mouth daily 2/22/2025 at  8:00 AM    glipiZIDE (GLUCOTROL XL) 10 MG 24 hr tablet Take 10 mg by mouth daily. 2/22/2025 at  8:00 AM    metFORMIN (GLUCOPHAGE) 1000 MG tablet TAKE 1 TABLET BY MOUTH TWICE A DAY WITH MEALS 2/22/2025 at  8:00 AM    metoprolol succinate ER (TOPROL XL) 25 MG 24 hr tablet Take 1 tablet (25 mg) by mouth daily 2/22/2025 at  8:00 AM    Multiple Vitamins-Minerals (CERTAVITE SENIOR) TABS TAKE ONE TABLET BY MOUTH ONCE DAILY 2/22/2025 at  8:00 AM    pregabalin (LYRICA) 50 MG capsule Take 50 mg by mouth 2 times daily. 2/22/2025 at  8:00 AM    tamsulosin (FLOMAX) 0.4 MG capsule TAKE 1 CAPSULE BY MOUTH EVERY DAY 2/22/2025 at  8:00 AM     traZODone (DESYREL) 100 MG tablet Take 1 tablet (100 mg) by mouth at bedtime 2/21/2025 Bedtime

## 2025-02-23 LAB
ANION GAP SERPL CALCULATED.3IONS-SCNC: 6 MMOL/L (ref 7–15)
BUN SERPL-MCNC: 25.3 MG/DL (ref 8–23)
CALCIUM SERPL-MCNC: 9.1 MG/DL (ref 8.8–10.4)
CHLORIDE SERPL-SCNC: 105 MMOL/L (ref 98–107)
CREAT SERPL-MCNC: 1.23 MG/DL (ref 0.67–1.17)
EGFRCR SERPLBLD CKD-EPI 2021: 58 ML/MIN/1.73M2
ERYTHROCYTE [DISTWIDTH] IN BLOOD BY AUTOMATED COUNT: 13.1 % (ref 10–15)
GLUCOSE BLDC GLUCOMTR-MCNC: 202 MG/DL (ref 70–99)
GLUCOSE BLDC GLUCOMTR-MCNC: 222 MG/DL (ref 70–99)
GLUCOSE BLDC GLUCOMTR-MCNC: 273 MG/DL (ref 70–99)
GLUCOSE BLDC GLUCOMTR-MCNC: 301 MG/DL (ref 70–99)
GLUCOSE BLDC GLUCOMTR-MCNC: 320 MG/DL (ref 70–99)
GLUCOSE SERPL-MCNC: 199 MG/DL (ref 70–99)
HCO3 SERPL-SCNC: 27 MMOL/L (ref 22–29)
HCT VFR BLD AUTO: 43.4 % (ref 40–53)
HGB BLD-MCNC: 14.8 G/DL (ref 13.3–17.7)
MCH RBC QN AUTO: 32.5 PG (ref 26.5–33)
MCHC RBC AUTO-ENTMCNC: 34.1 G/DL (ref 31.5–36.5)
MCV RBC AUTO: 95 FL (ref 78–100)
PLATELET # BLD AUTO: 202 10E3/UL (ref 150–450)
POTASSIUM SERPL-SCNC: 4.6 MMOL/L (ref 3.4–5.3)
RBC # BLD AUTO: 4.56 10E6/UL (ref 4.4–5.9)
SODIUM SERPL-SCNC: 138 MMOL/L (ref 135–145)
WBC # BLD AUTO: 8 10E3/UL (ref 4–11)

## 2025-02-23 PROCEDURE — 82374 ASSAY BLOOD CARBON DIOXIDE: CPT

## 2025-02-23 PROCEDURE — 258N000003 HC RX IP 258 OP 636: Performed by: STUDENT IN AN ORGANIZED HEALTH CARE EDUCATION/TRAINING PROGRAM

## 2025-02-23 PROCEDURE — 99233 SBSQ HOSP IP/OBS HIGH 50: CPT | Mod: GC

## 2025-02-23 PROCEDURE — 85027 COMPLETE CBC AUTOMATED: CPT

## 2025-02-23 PROCEDURE — 250N000011 HC RX IP 250 OP 636: Performed by: STUDENT IN AN ORGANIZED HEALTH CARE EDUCATION/TRAINING PROGRAM

## 2025-02-23 PROCEDURE — 250N000013 HC RX MED GY IP 250 OP 250 PS 637

## 2025-02-23 PROCEDURE — 80048 BASIC METABOLIC PNL TOTAL CA: CPT

## 2025-02-23 PROCEDURE — 250N000012 HC RX MED GY IP 250 OP 636 PS 637

## 2025-02-23 PROCEDURE — 120N000001 HC R&B MED SURG/OB

## 2025-02-23 PROCEDURE — 36415 COLL VENOUS BLD VENIPUNCTURE: CPT

## 2025-02-23 PROCEDURE — 250N000011 HC RX IP 250 OP 636

## 2025-02-23 PROCEDURE — 258N000003 HC RX IP 258 OP 636

## 2025-02-23 RX ORDER — QUETIAPINE FUMARATE 25 MG/1
25 TABLET, FILM COATED ORAL
Status: COMPLETED | OUTPATIENT
Start: 2025-02-23 | End: 2025-02-23

## 2025-02-23 RX ADMIN — CETIRIZINE HYDROCHLORIDE 10 MG: 10 TABLET, FILM COATED ORAL at 08:09

## 2025-02-23 RX ADMIN — INSULIN ASPART 2 UNITS: 100 INJECTION, SOLUTION INTRAVENOUS; SUBCUTANEOUS at 08:08

## 2025-02-23 RX ADMIN — PIPERACILLIN AND TAZOBACTAM 3.38 G: 3; .375 INJECTION, POWDER, FOR SOLUTION INTRAVENOUS at 08:08

## 2025-02-23 RX ADMIN — ASPIRIN 81 MG: 81 TABLET, CHEWABLE ORAL at 08:09

## 2025-02-23 RX ADMIN — ENOXAPARIN SODIUM 40 MG: 40 INJECTION SUBCUTANEOUS at 16:04

## 2025-02-23 RX ADMIN — PIPERACILLIN AND TAZOBACTAM 3.38 G: 3; .375 INJECTION, POWDER, FOR SOLUTION INTRAVENOUS at 23:36

## 2025-02-23 RX ADMIN — SODIUM CHLORIDE: 0.9 INJECTION, SOLUTION INTRAVENOUS at 03:38

## 2025-02-23 RX ADMIN — ACETAMINOPHEN 650 MG: 325 TABLET ORAL at 08:09

## 2025-02-23 RX ADMIN — DONEPEZIL HYDROCHLORIDE 5 MG: 5 TABLET ORAL at 20:44

## 2025-02-23 RX ADMIN — ACETAMINOPHEN 650 MG: 325 TABLET ORAL at 20:44

## 2025-02-23 RX ADMIN — INSULIN GLARGINE 10 UNITS: 100 INJECTION, SOLUTION SUBCUTANEOUS at 13:31

## 2025-02-23 RX ADMIN — PIPERACILLIN AND TAZOBACTAM 3.38 G: 3; .375 INJECTION, POWDER, FOR SOLUTION INTRAVENOUS at 00:00

## 2025-02-23 RX ADMIN — SODIUM CHLORIDE: 0.9 INJECTION, SOLUTION INTRAVENOUS at 16:08

## 2025-02-23 RX ADMIN — INSULIN ASPART 4 UNITS: 100 INJECTION, SOLUTION INTRAVENOUS; SUBCUTANEOUS at 16:04

## 2025-02-23 RX ADMIN — FLUOXETINE HYDROCHLORIDE 10 MG: 10 CAPSULE ORAL at 08:09

## 2025-02-23 RX ADMIN — FINASTERIDE 5 MG: 5 TABLET, FILM COATED ORAL at 08:09

## 2025-02-23 RX ADMIN — QUETIAPINE FUMARATE 25 MG: 25 TABLET ORAL at 00:32

## 2025-02-23 RX ADMIN — METOPROLOL SUCCINATE 25 MG: 25 TABLET, EXTENDED RELEASE ORAL at 08:09

## 2025-02-23 RX ADMIN — PREGABALIN 50 MG: 50 CAPSULE ORAL at 20:44

## 2025-02-23 RX ADMIN — INSULIN ASPART 4 UNITS: 100 INJECTION, SOLUTION INTRAVENOUS; SUBCUTANEOUS at 13:30

## 2025-02-23 RX ADMIN — Medication 1 MG: at 23:51

## 2025-02-23 RX ADMIN — PREGABALIN 50 MG: 50 CAPSULE ORAL at 08:09

## 2025-02-23 RX ADMIN — ACETAMINOPHEN 650 MG: 325 TABLET ORAL at 13:30

## 2025-02-23 RX ADMIN — QUETIAPINE FUMARATE 25 MG: 25 TABLET ORAL at 14:31

## 2025-02-23 RX ADMIN — SODIUM CHLORIDE 1250 MG: 9 INJECTION, SOLUTION INTRAVENOUS at 10:00

## 2025-02-23 RX ADMIN — PIPERACILLIN AND TAZOBACTAM 3.38 G: 3; .375 INJECTION, POWDER, FOR SOLUTION INTRAVENOUS at 16:04

## 2025-02-23 RX ADMIN — TAMSULOSIN HYDROCHLORIDE 0.4 MG: 0.4 CAPSULE ORAL at 08:09

## 2025-02-23 RX ADMIN — TRAZODONE HYDROCHLORIDE 100 MG: 50 TABLET ORAL at 20:44

## 2025-02-23 ASSESSMENT — ACTIVITIES OF DAILY LIVING (ADL)
ADLS_ACUITY_SCORE: 54
ADLS_ACUITY_SCORE: 72
ADLS_ACUITY_SCORE: 66
ADLS_ACUITY_SCORE: 54
ADLS_ACUITY_SCORE: 54
ADLS_ACUITY_SCORE: 72
ADLS_ACUITY_SCORE: 66
ADLS_ACUITY_SCORE: 66
ADLS_ACUITY_SCORE: 54
ADLS_ACUITY_SCORE: 66
ADLS_ACUITY_SCORE: 66
ADLS_ACUITY_SCORE: 54
ADLS_ACUITY_SCORE: 72
ADLS_ACUITY_SCORE: 65
ADLS_ACUITY_SCORE: 60
ADLS_ACUITY_SCORE: 66
ADLS_ACUITY_SCORE: 54
ADLS_ACUITY_SCORE: 60
ADLS_ACUITY_SCORE: 72
ADLS_ACUITY_SCORE: 54
ADLS_ACUITY_SCORE: 66

## 2025-02-23 NOTE — PLAN OF CARE
Problem: Adult Inpatient Plan of Care  Goal: Optimal Comfort and Wellbeing  Outcome: Progressing     Problem: Pain Acute  Goal: Optimal Pain Control and Function  Outcome: Progressing  Intervention: Prevent or Manage Pain  Recent Flowsheet Documentation  Taken 2/23/2025 0000 by Yobani Workman RN  Medication Review/Management: medications reviewed     Problem: Infection  Goal: Absence of Infection Signs and Symptoms  Outcome: Progressing   Goal Outcome Evaluation:       BP was high but within parameters. Pt aggressive and confused at start of shift and swung a closed fist at writer but missed. Pt would not let writer connect IV antibiotic to pts IV. PRN seroquel given for agitation and for trying to get out of bed/ pulling on IV tubing. Pt uses bedside urinal / commode with assistance. Incontinence cares provided. On 1:1. BG at 0500 was 222. Pt had a sandwich and ice cream for a snack around 0100 and ate all of it. NS running at 100ml/hr.

## 2025-02-23 NOTE — PLAN OF CARE
Goal Outcome Evaluation:    Alert to self.  No behaviors today. Sleeping on and off. One to one continued for safety.  Tolerating diet.  Left hand is painful for pt.  Tylenol this am. Elevated on a pillow.  Swelling and redness has not gotten worse or beyond the markings.  Son was visiting at the bedside today.

## 2025-02-23 NOTE — PROGRESS NOTES
[facility]    Progress Note - Hospitalist Service       Date of Admission:  2/22/2025    Assessment & Plan   Nestor Peterson is a 84 year old male with past medical history of lewy body dementia, T2DM, HTN, BPH admitted on 2/22/2025 with cellulitis of the left hand/wrist +/- possible pseudogout vs. Pyrophosphate, no evidence of osteomyelitis.     Suspected cellulitis   Reported treatment for cellulitis this past week at an outside urgent care with oral cephalexin. Patient's son notes that over the last week, swelling, redness, and pain have slowly progressed. XR on admission negative for acute fracture or osteomyelitis. Unfortunately, MRI was non-diagnostic due to profound motion artifact due agitation of the patient. CT on admission demonstrating diffuse soft tissue edema over dorsum of L hand and wrist most consistent with cellulitis and no drainable soft tissue collection. Some concern for poss. Pseudogout vs. Intraarticular infection however no clear evidence of osteomyelitis. Overall vitals stable, labs remarkable for CRP of 16, lactic acid of 2.2. WBC normal.  No clear lesions, pustules, or draining abscess.   - Cont. Empiric vancomycin + zosyn  - follow blood cultures (NGTD)  - scheduled tylenol for pain control  - NS @ 100mL/hr   -Consider ortho consult to tap joint if not improving with broad spectrum abx.     T2DM  Blood sugar on admission 262, most recent A1c of 9.7 over 6 months ago. PTA medications include 10 mg glipizide, 2000mg metformin daily.  Fasting  this AM, has gotten 2 units aspart x2  -Will add lantus for longer duration coverage 2/23   -TDD estimated ~50 units, however will start lower as he is closer to goal with 10 units lantus PM, titrate as needed  - repeat A1c 9.1, slightly improved from prior  - medium dose sliding scale insulin      Lewy-body dementia  Oriented to self only, has been persistently aggressive towards staff while admitted. Olanzapine  relatively contraindicated in LBD.   Got ativan and haldol 2/22 due to agitation.  - seroquel 25 mg nightly as needed           Diet: Combination Diet Regular Diet Adult    DVT Prophylaxis: Enoxaparin (Lovenox) SQ  Rowe Catheter: Not present  Fluids: NS @ 100 mL/hr  Lines: None     Cardiac Monitoring: None  Code Status: Full Code      Clinically Significant Risk Factors Present on Admission                 # Drug Induced Platelet Defect: home medication list includes an antiplatelet medication   # Hypertension: Noted on problem list     # Dementia: noted on problem list      # DMII: A1C = 9.1 % (Ref range: <5.7 %) within past 6 months               Social Drivers of Health   Physical Activity: Inactive (4/8/2024)    Exercise Vital Sign     Days of Exercise per Week: 0 days     Minutes of Exercise per Session: 0 min   Stress: Stress Concern Present (4/8/2024)    [employer] [place] [employer] - Occupational Stress Questionnaire     Feeling of Stress : Rather much   Social Connections: Unknown (4/8/2024)    Social Connection and Isolation Panel [NHANES]     Frequency of Social Gatherings with Friends and Family: Never         Disposition Plan     Medically Ready for Discharge: Anticipated in 2-4 Days         The patient's care was discussed with the Attending Physician, Dr. Blank .    Andrea Booker MD  Hospitalist Service  [facility]  Securely message with [employer] (more info)  Text page via [employer] Paging/Directory   ______________________________________________________________________    Interval History   Patient is very hard of hearing, endorses some pain in his L hand, wasn't able to tell me if worse or better than on admission. Swelling appears stable. He has limited wrist and hand ROM but strength is preserved. Reports good appetite and slept well throughout the evening. Will continue abx, could consider consult to ortho if not improving.     Physical Exam   Vital Signs:  Temp: 98  F (36.7  C) Temp src: Axillary BP: (!) 166/78 Pulse: 66   Resp: 18 SpO2: 93 % O2 Device: None (Room air)    Weight: 218 lbs 0 oz    GEN: Drowsy, moderately cooperative on exam. No acute distress.  HEENT: Normocephalic, atraumatic.  NECK: Supple. No cervical or supraclavicular adenopathy.   PULM: Non-labored breathing. No use of accessory muscles. Clear to ausculation bilaterally. No wheezes or crackles.   CV: Regular rate and rhythm. Normal S1, S2. No rubs, murmurs, or gallops.    ABDOMEN: Normoactive bowel sounds. Non-tender to palpation. Non-distended.    EXTREMITES:  Left hand with diffuse erythema and swelling. Warm and Mildly tender to palpation. Does not extend past previously drawn margins on admission.   NEURO:  Awake. Oriented to person only.   PSYCH: Dementia.         Data     I have personally reviewed the following data over the past 24 hrs:    8.0  \   14.8   / 202     138 105 25.3 (H) /  199 (H)   4.6 27 1.23 (H) \     TSH: N/A T4: N/A A1C: N/A     Procal: N/A CRP: N/A Lactic Acid: 2.2 (H)         Imaging results reviewed over the past 24 hrs:   Recent Results (from the past 24 hours)   MR Hand Left w/o Contrast    Narrative    MRI ATTEMPTED BUT FAILED    The exam is nondiagnostic, as the patient was unable to complete the examination. Patient should reschedule when clinically appropriate.     Markedly motion degraded and off-center T2 fat sat axial, coronal and coronal PD images show soft tissue swelling along the visualized wrist and hand.    NOTE: ABNORMAL REPORT    THE DICTATION ABOVE DESCRIBES AN ABNORMALITY FOR WHICH FOLLOW-UP IS NEEDED.      CT Hand Left w Contrast    Narrative    EXAM: CT HAND LEFT W CONTRAST  LOCATION: [facility]  DATE: 2/22/2025    INDICATION: Left hand infection, concern for abscess, etc.  COMPARISON: None.  TECHNIQUE: IV contrast. Axial, sagittal and coronal thin-section reconstruction. Dose reduction techniques were used.   CONTRAST: 75  ml Isovue 370.    FINDINGS:    Images are degraded by patient motion artifact despite repeat sequence acquisition, somewhat limiting evaluation.    BONES:   -Negative for acute fracture within the hand or wrist. Scattered mild degenerative changes involving the thumb CMC, triscaphe, MCP and interphalangeal joints. There is chondrocalcinosis scattered within the wrist and involving the metacarpophalangeal   joints most pronounced at the third MCP joint.    -There are radiocarpal and distal radioulnar joint effusions. There is a third MCP joint effusion as well.    -No CT evidence for osteomyelitis.    SOFT TISSUES:  -There is diffuse soft tissue edema over the dorsum of the hand and wrist, without a drainable soft tissue collection. There is second, third and fourth extensor compartment tenosynovitis, nonspecific.      Impression    IMPRESSION:  1.  Diffuse soft tissue edema over the dorsum of the hand and wrist is nonspecific but could be seen with cellulitis in the appropriate clinical setting. No drainable soft tissue collection.  2.  There is second, third and fourth extensor compartment tenosynovitis, also nonspecific.  3.  Scattered degenerative changes of the hand and wrist. However, there is considerable chondrocalcinosis and there are radiocarpal, distal radioulnar and third MCP joint effusions. Findings could be seen with calcium pyrophosphate deposition   arthropathy and clinical correlation for symptoms of pseudogout would be helpful. Infection could have a similar appearance and should be excluded clinically.  4.  No CT evidence for osteomyelitis.         Andrea Booker MD  PGY-2  [facility] [place] [place] [facility]  [facility]  February 23, 2025

## 2025-02-23 NOTE — PLAN OF CARE
Problem: Adult Inpatient Plan of Care  Goal: Absence of Hospital-Acquired Illness or Injury  Intervention: Identify and Manage Fall Risk  Recent Flowsheet Documentation  Taken 2/22/2025 1830 by Denise Valero RN  Safety Promotion/Fall Prevention:   activity supervised   room organization consistent   room door open   room near nurse's station     Problem: Adult Inpatient Plan of Care  Goal: Absence of Hospital-Acquired Illness or Injury  Intervention: Prevent Skin Injury  Recent Flowsheet Documentation  Taken 2/22/2025 1830 by Denise Valero RN  Body Position: position changed independently     Problem: Adult Inpatient Plan of Care  Goal: Absence of Hospital-Acquired Illness or Injury  Intervention: Prevent and Manage VTE (Venous Thromboembolism) Risk  Recent Flowsheet Documentation  Taken 2/22/2025 1830 by Denise Valero, BRIAN  VTE Prevention/Management: other (see comments)   Goal Outcome Evaluation:       Pt was admitted from ED to room 107 around 1800 via stretcher. Assist of 2 to settle in bed. Pt is alert confused with intermittent agitation. Cellulitis Left hand looks red and swollen but maintains good CMS. Ordered tylenol adequate for pain control. Assisted  with dinner and pt ate 100%. Voiding adequately in urinal. Ordered  IV antibiotic infused. NS 100cc/hr infusing without problem. 1:1 sitter in room with pt for pt's safety. Plan of care ongoing.

## 2025-02-24 LAB
CREAT SERPL-MCNC: 1.44 MG/DL (ref 0.67–1.17)
EGFRCR SERPLBLD CKD-EPI 2021: 48 ML/MIN/1.73M2
GLUCOSE BLDC GLUCOMTR-MCNC: 200 MG/DL (ref 70–99)
GLUCOSE BLDC GLUCOMTR-MCNC: 251 MG/DL (ref 70–99)
GLUCOSE BLDC GLUCOMTR-MCNC: 336 MG/DL (ref 70–99)

## 2025-02-24 PROCEDURE — 250N000011 HC RX IP 250 OP 636

## 2025-02-24 PROCEDURE — 250N000011 HC RX IP 250 OP 636: Performed by: STUDENT IN AN ORGANIZED HEALTH CARE EDUCATION/TRAINING PROGRAM

## 2025-02-24 PROCEDURE — 258N000003 HC RX IP 258 OP 636

## 2025-02-24 PROCEDURE — 250N000011 HC RX IP 250 OP 636: Mod: JW

## 2025-02-24 PROCEDURE — 120N000001 HC R&B MED SURG/OB

## 2025-02-24 PROCEDURE — 250N000013 HC RX MED GY IP 250 OP 250 PS 637

## 2025-02-24 PROCEDURE — 99233 SBSQ HOSP IP/OBS HIGH 50: CPT | Mod: GC

## 2025-02-24 PROCEDURE — 258N000003 HC RX IP 258 OP 636: Performed by: STUDENT IN AN ORGANIZED HEALTH CARE EDUCATION/TRAINING PROGRAM

## 2025-02-24 RX ORDER — TRAZODONE HYDROCHLORIDE 50 MG/1
50 TABLET ORAL
Status: DISCONTINUED | OUTPATIENT
Start: 2025-02-24 | End: 2025-02-24

## 2025-02-24 RX ORDER — OLANZAPINE 10 MG/2ML
5 INJECTION, POWDER, FOR SOLUTION INTRAMUSCULAR DAILY PRN
Status: DISCONTINUED | OUTPATIENT
Start: 2025-02-24 | End: 2025-02-24

## 2025-02-24 RX ORDER — DEXTROSE MONOHYDRATE 25 G/50ML
25-50 INJECTION, SOLUTION INTRAVENOUS
Status: DISCONTINUED | OUTPATIENT
Start: 2025-02-24 | End: 2025-02-24

## 2025-02-24 RX ORDER — HALOPERIDOL 5 MG/ML
INJECTION INTRAMUSCULAR
Status: COMPLETED
Start: 2025-02-24 | End: 2025-02-24

## 2025-02-24 RX ORDER — NICOTINE POLACRILEX 4 MG
15-30 LOZENGE BUCCAL
Status: DISCONTINUED | OUTPATIENT
Start: 2025-02-24 | End: 2025-02-24

## 2025-02-24 RX ORDER — QUETIAPINE FUMARATE 25 MG/1
50 TABLET, FILM COATED ORAL AT BEDTIME
Status: DISCONTINUED | OUTPATIENT
Start: 2025-02-24 | End: 2025-02-26

## 2025-02-24 RX ORDER — TAMSULOSIN HYDROCHLORIDE 0.4 MG/1
0.8 CAPSULE ORAL DAILY
Status: DISPENSED | OUTPATIENT
Start: 2025-02-25

## 2025-02-24 RX ORDER — OLANZAPINE 10 MG/2ML
5 INJECTION, POWDER, FOR SOLUTION INTRAMUSCULAR
Status: COMPLETED | OUTPATIENT
Start: 2025-02-24 | End: 2025-02-24

## 2025-02-24 RX ORDER — HYDRALAZINE HYDROCHLORIDE 20 MG/ML
10 INJECTION INTRAMUSCULAR; INTRAVENOUS EVERY 6 HOURS PRN
Status: DISPENSED | OUTPATIENT
Start: 2025-02-24

## 2025-02-24 RX ORDER — PETROLATUM,WHITE
OINTMENT IN PACKET (GRAM) TOPICAL 2 TIMES DAILY PRN
Status: ACTIVE | OUTPATIENT
Start: 2025-02-24

## 2025-02-24 RX ORDER — QUETIAPINE FUMARATE 25 MG/1
25 TABLET, FILM COATED ORAL EVERY MORNING
Status: DISCONTINUED | OUTPATIENT
Start: 2025-02-24 | End: 2025-02-26

## 2025-02-24 RX ORDER — HALOPERIDOL 5 MG/ML
2 INJECTION INTRAMUSCULAR
Status: COMPLETED | OUTPATIENT
Start: 2025-02-24 | End: 2025-02-24

## 2025-02-24 RX ADMIN — ACETAMINOPHEN 650 MG: 325 TABLET ORAL at 09:43

## 2025-02-24 RX ADMIN — METOPROLOL SUCCINATE 25 MG: 25 TABLET, EXTENDED RELEASE ORAL at 09:39

## 2025-02-24 RX ADMIN — ENOXAPARIN SODIUM 40 MG: 40 INJECTION SUBCUTANEOUS at 16:03

## 2025-02-24 RX ADMIN — QUETIAPINE 50 MG: 25 TABLET ORAL at 18:25

## 2025-02-24 RX ADMIN — SODIUM CHLORIDE 100 ML/HR: 0.9 INJECTION, SOLUTION INTRAVENOUS at 00:55

## 2025-02-24 RX ADMIN — PIPERACILLIN AND TAZOBACTAM 3.38 G: 3; .375 INJECTION, POWDER, FOR SOLUTION INTRAVENOUS at 15:29

## 2025-02-24 RX ADMIN — SODIUM CHLORIDE: 0.9 INJECTION, SOLUTION INTRAVENOUS at 12:47

## 2025-02-24 RX ADMIN — PIPERACILLIN AND TAZOBACTAM 3.38 G: 3; .375 INJECTION, POWDER, FOR SOLUTION INTRAVENOUS at 08:26

## 2025-02-24 RX ADMIN — SODIUM CHLORIDE 1250 MG: 9 INJECTION, SOLUTION INTRAVENOUS at 10:36

## 2025-02-24 RX ADMIN — INSULIN ASPART 2 UNITS: 100 INJECTION, SOLUTION INTRAVENOUS; SUBCUTANEOUS at 08:23

## 2025-02-24 RX ADMIN — ASPIRIN 81 MG: 81 TABLET, CHEWABLE ORAL at 09:45

## 2025-02-24 RX ADMIN — TAMSULOSIN HYDROCHLORIDE 0.4 MG: 0.4 CAPSULE ORAL at 09:47

## 2025-02-24 RX ADMIN — HYDRALAZINE HYDROCHLORIDE 10 MG: 20 INJECTION INTRAMUSCULAR; INTRAVENOUS at 13:36

## 2025-02-24 RX ADMIN — QUETIAPINE FUMARATE 25 MG: 25 TABLET ORAL at 00:45

## 2025-02-24 RX ADMIN — FLUOXETINE HYDROCHLORIDE 10 MG: 10 CAPSULE ORAL at 09:41

## 2025-02-24 RX ADMIN — CETIRIZINE HYDROCHLORIDE 10 MG: 10 TABLET, FILM COATED ORAL at 09:45

## 2025-02-24 RX ADMIN — PREGABALIN 50 MG: 50 CAPSULE ORAL at 09:40

## 2025-02-24 RX ADMIN — QUETIAPINE 25 MG: 25 TABLET ORAL at 13:34

## 2025-02-24 RX ADMIN — HALOPERIDOL LACTATE 2 MG: 5 INJECTION, SOLUTION INTRAMUSCULAR at 03:12

## 2025-02-24 RX ADMIN — FINASTERIDE 5 MG: 5 TABLET, FILM COATED ORAL at 09:41

## 2025-02-24 RX ADMIN — OLANZAPINE 5 MG: 10 INJECTION, POWDER, FOR SOLUTION INTRAMUSCULAR at 02:47

## 2025-02-24 RX ADMIN — ACETAMINOPHEN 650 MG: 325 TABLET ORAL at 13:34

## 2025-02-24 RX ADMIN — PIPERACILLIN AND TAZOBACTAM 3.38 G: 3; .375 INJECTION, POWDER, FOR SOLUTION INTRAVENOUS at 23:56

## 2025-02-24 RX ADMIN — INSULIN GLARGINE 10 UNITS: 100 INJECTION, SOLUTION SUBCUTANEOUS at 08:23

## 2025-02-24 ASSESSMENT — ACTIVITIES OF DAILY LIVING (ADL)
DEPENDENT_IADLS:: CLEANING;COOKING;LAUNDRY;SHOPPING;MEAL PREPARATION;MEDICATION MANAGEMENT;MONEY MANAGEMENT;TRANSPORTATION
ADLS_ACUITY_SCORE: 82
ADLS_ACUITY_SCORE: 83
ADLS_ACUITY_SCORE: 82
ADLS_ACUITY_SCORE: 76
ADLS_ACUITY_SCORE: 83
ADLS_ACUITY_SCORE: 82
ADLS_ACUITY_SCORE: 72
ADLS_ACUITY_SCORE: 82
ADLS_ACUITY_SCORE: 83
ADLS_ACUITY_SCORE: 72
ADLS_ACUITY_SCORE: 80
ADLS_ACUITY_SCORE: 82
ADLS_ACUITY_SCORE: 81
ADLS_ACUITY_SCORE: 82
ADLS_ACUITY_SCORE: 72
ADLS_ACUITY_SCORE: 82
ADLS_ACUITY_SCORE: 72
ADLS_ACUITY_SCORE: 82
ADLS_ACUITY_SCORE: 83

## 2025-02-24 NOTE — PLAN OF CARE
Problem: Adult Inpatient Plan of Care  Goal: Optimal Comfort and Wellbeing  Intervention: Monitor Pain and Promote Comfort  Recent Flowsheet Documentation  Taken 2/24/2025 0943 by Basilia Prabhakar RN  Pain Management Interventions:   medication (see MAR)   distraction  Intervention: Provide Person-Centered Care  Recent Flowsheet Documentation  Taken 2/24/2025 0800 by Basilia Prabhakar RN  Trust Relationship/Rapport:   care explained   choices provided   emotional support provided   empathic listening provided   questions answered   questions encouraged   reassurance provided   thoughts/feelings acknowledged     Problem: Infection  Goal: Absence of Infection Signs and Symptoms  Outcome: Progressing  Intervention: Prevent or Manage Infection  Recent Flowsheet Documentation  Taken 2/24/2025 0800 by Basilia Prabhakar RN  Infection Management: aseptic technique maintained     Problem: Delirium  Goal: Improved Behavioral Control  Outcome: Progressing  Intervention: Minimize Safety Risk  Recent Flowsheet Documentation  Taken 2/24/2025 0800 by Basilia Prabhakar RN  Enhanced Safety Measures:    at bedside   room near unit station   review medications for side effects with activity   pain management  Trust Relationship/Rapport:   care explained   choices provided   emotional support provided   empathic listening provided   questions answered   questions encouraged   reassurance provided   thoughts/feelings acknowledged       Problem: Restraint, Nonviolent  Goal: Absence of Harm or Injury  Outcome: Progressing  Intervention: Implement Least Restrictive Safety Strategies  Recent Flowsheet Documentation  Taken 2/24/2025 0800 by Basilia Prabhakar RN  Diversional Activities: television  Intervention: Protect Dignity, Rights and Personal Wellbeing  Recent Flowsheet Documentation  Taken 2/24/2025 0800 by Basilia Prabhakar RN  Trust Relationship/Rapport:   care explained   choices provided   emotional support  Detail Level: Generalized provided   empathic listening provided   questions answered   questions encouraged   reassurance provided   thoughts/feelings acknowledged  Intervention: Protect Skin and Joint Integrity  Recent Flowsheet Documentation  Taken 2/24/2025 1100 by Basilia Prabhakar RN  Body Position:   turned   left  Taken 2/24/2025 0800 by Basilia Prabhakar RN  Body Position: position changed independently     Problem: Hypertension Acute  Goal: Blood Pressure Within Desired Range  Outcome: Progressing  Intervention: Normalize Blood Pressure  Recent Flowsheet Documentation  Taken 2/24/2025 0800 by Basilia Prabhakar RN  Medication Review/Management: medications reviewed   Patient on 1:1 for continued safety, picking at lines and confusion. Was on 4 point non violent soft limb restraints. I was able to remove the ankle restraints. Patient can go from angry to calm to sleeping very fast. He only sleeps for about 10 mins at a time and wakes himself up and begins to pick at things and tries to get up. Pain well managed with scheduled Tylenol. Hand cellulitis improved. Continue IV antibiotics. BP has been high today at 203/93. Order for Hydralazine obtained and given. BP better but still high at 170/77. I reported this to Dr. Booker. Son here and updated on plan of care.  Code status updated to DNR/DNI. New band applied. Patient eating and drinking very well. IVF stopped. Using urinal with help. New scheduled order for Seroquel given. Hoping to be able to discontinue restraints today.   I

## 2025-02-24 NOTE — PROGRESS NOTES
Brief Progress Note:    Had discussion w/son at bedside to discuss GOC. Son is legal guardian and adamantly states confirmation of DNR/DNI. Will update code status accordingly.    Andrea Booker MD  PGY-2  [facility]  [facility]  February 24, 2025

## 2025-02-24 NOTE — SIGNIFICANT EVENT
Pt just slept for about 30 minutes and he started trying to climb out of the bed. MD informed, started 4 point non violent restraints with the help of the staff and security personnel to hold the pt at 0345. Asked pt if he wants to void, he said no,bladder scan showed 735 ml. Pt started to void but he was fighting when the urinal was placed. Able to catch 200 ml in the urinal the rest he was voiding in the bed. PVR showed 572 ml. MD was in the room when pt was voiding and fighting with the staff. Pt sleeping now.

## 2025-02-24 NOTE — CONSULTS
Care Management Initial Consult    General Information  Assessment completed with: Cordelia, sonAnnetta  Type of CM/SW Visit: Initial Assessment    Primary Care Provider verified and updated as needed:  ([facility])   Readmission within the last 30 days: no previous admission in last 30 days         Advance Care Planning: Advance Care Planning Reviewed: present on chart          Communication Assessment  Patient's communication style: spoken language (English or Bilingual)    Hearing Difficulty or Deaf: yes   Wear Glasses or Blind: no    Cognitive  Cognitive/Neuro/Behavioral: .WDL except, level of consciousness, orientation, mood/behavior  Level of Consciousness: confused  Arousal Level: opens eyes spontaneously, arouses to voice  Orientation: disoriented to, place, time, situation  Mood/Behavior: calm, cooperative  Best Language: 0 - No aphasia  Speech: clear    Living Environment:   People in home: child(ralf), adult     Current living Arrangements: assisted living (memory care)  Name of Facility: [facility]   Able to return to prior arrangements: yes       Family/Social Support:  Care provided by: child(ralf), other (see comments)  Provides care for: no one, unable/limited ability to care for self     Support system: Children, Facility resident(s)/Staff          Description of Support System: Supportive, Involved         Current Resources:   Patient receiving home care services: No        Community Resources: None  Equipment currently used at home: none  Supplies currently used at home: None    Employment/Financial:  Employment Status:          Financial Concerns:             Does the patient's insurance plan have a 3 day qualifying hospital stay waiver?  Yes     Which insurance plan 3 day waiver is available? Alternative insurance waiver    Will the waiver be used for post-acute placement? Undetermined at this time    Lifestyle & Psychosocial Needs:  Social Drivers of Health     Food Insecurity: Low  Risk  (4/8/2024)    Food Insecurity     Within the past 12 months, did you worry that your food would run out before you got money to buy more?: No     Within the past 12 months, did the food you bought just not last and you didn t have money to get more?: No   Depression: Not at risk (9/18/2023)    PHQ-2     PHQ-2 Score: 1   Housing Stability: Low Risk  (4/8/2024)    Housing Stability     Do you have housing? : Yes     Are you worried about losing your housing?: No   Tobacco Use: Low Risk  (2/22/2025)    Patient History     Smoking Tobacco Use: Never     Smokeless Tobacco Use: Never     Passive Exposure: Never   Financial Resource Strain: Low Risk  (4/8/2024)    Financial Resource Strain     Within the past 12 months, have you or your family members you live with been unable to get utilities (heat, electricity) when it was really needed?: No   Alcohol Use: Not on file   Transportation Needs: Low Risk  (4/8/2024)    Transportation Needs     Within the past 12 months, has lack of transportation kept you from medical appointments, getting your medicines, non-medical meetings or appointments, work, or from getting things that you need?: No   Physical Activity: Inactive (4/8/2024)    Exercise Vital Sign     Days of Exercise per Week: 0 days     Minutes of Exercise per Session: 0 min   Interpersonal Safety: High Risk (2/23/2025)    Interpersonal Safety     Do you feel physically and emotionally safe where you currently live?: No     Within the past 12 months, have you been hit, slapped, kicked or otherwise physically hurt by someone?: No     Within the past 12 months, have you been humiliated or emotionally abused in other ways by your partner or ex-partner?: No   Stress: Stress Concern Present (4/8/2024)    [employer] [place] [employer] - Occupational Stress Questionnaire     Feeling of Stress : Rather much   Social Connections: Unknown (4/8/2024)    Social Connection and Isolation Panel [NHANES]      "Frequency of Communication with Friends and Family: Not on file     Frequency of Social Gatherings with Friends and Family: Never     Attends Yazidism Services: Not on file     Active Member of Clubs or Organizations: Not on file     Attends Club or Organization Meetings: Not on file     Marital Status: Not on file   Health Literacy: Not on file       Functional Status:  Prior to admission patient needed assistance:   Dependent ADLs:: Bathing, Dressing  Dependent IADLs:: Cleaning, Cooking, Laundry, Shopping, Meal Preparation, Medication Management, Money Management, Transportation               Discussed  Partnership in Safe Discharge Planning  document with patient/family: No    Additional Information:    Assessment completed with patient's son Francisco Javier. Patient lives in memory care at [facility] since August 2024. He requires cueing for ADLs, assisted with IADLs and ambulates without devices but walker has been recommended.  Francisco Javier reports over the last couple months patient has \"gotten worse\". He has had increasing difficulty ambulating, further decrease in cognition and more behaviors. Francisco Javier and daughter-Deepali are legal guardians and primary family contacts.           Kelsey Jones RN      "

## 2025-02-24 NOTE — PLAN OF CARE
Goal Outcome Evaluation:      Plan of Care Reviewed With: patient    Overall Patient Progress: improvingOverall Patient Progress: improving    Outcome Evaluation: Pt was drowsy at the beginning of the shift.  He was more alert later.  Pt is oriented to place and self and has been cooperative with cares most of the time.  When he has gotten more agitated it is because he is uncomfortable in the bed and is frustrated.  He got up to the side of the bed and to the chair several times and did well with an assist x1 and a gait belt.  Pt was calmer after moving around and using the urinal.  Pt had is swollen and tender.  Ice applied on and off this shift.  Scheduled Tylenol given.  Tried to keep pts hand and arm elevated on a pillow, but he didn't always keep it there.  The redness appears to be less than the markings but no change this shift.

## 2025-02-24 NOTE — PROGRESS NOTES
Pt is sleeping on and off. He keeps asking to release him from the restraints, then goes back to sleep again. Offered water to drink, asked if he needs to void. He voided about 250 ml and some urine incontinence at 0430, PVR showed 317 ml. Bed sheets changed, perineal care done. Primofit placed at 0510, pt voided about 500  ml at 0642.    0715 Left a voice message to the pt's son to call the unit to inform him of the restraint application.

## 2025-02-24 NOTE — PROGRESS NOTES
1:31 AM    Notified by nursing staff that patient was increasingly agitated. Wanting to go home. Given melatonin and Seroquel without effect. Unable to be redirected and tried to hit staff.     Plan:  - 5 mg Zyprexa IM once prn     2:58 AM    Notified by nursing staff that patient again trying to get out of bed. 4 staff members required to keep him in bed. Trying to hit and kick staff as well. Zyprexa not effective.     - 2 mg Haldol IV once prn    3:33 AM  Notified that patient required 7 to hold him down to receive Haldol. Again trying to swing, hit, and grab staff. Soft restraints applied.     Assessed patient at bedside. Adequate room between restraints and skin. Bladder scanned for >700 mL. Patient then began urinating on himself in bed and refused use of urinal. Eventually able to convince patient to use urinal. Bladder scanned again for 500 mL. Patient has not yet needed to have straight cath and had not been retaining. Catheter would like irritate him more so will defer and encourage him to urinate in urinal.     Jocelyn Meza MD

## 2025-02-24 NOTE — PROGRESS NOTES
[facility]    Progress Note - Hospitalist Service       Date of Admission:  2/22/2025    Assessment & Plan   Nestor Peterson is a 84 year old male with past medical history of lewy body dementia, T2DM, HTN, BPH admitted on 2/22/2025 with cellulitis of the left hand/wrist +/- possible pseudogout vs. Pyrophosphate, no evidence of osteomyelitis. Behavioral agitation/delirium in setting of dementia.    Updates 2/24:  Will just have BL UE soft restraints, ok to remove lower extremity restraints  -Schedule Seroquel 25 mg AM, 50 mg at bedtime, please avoid additional antipsychotics  -Discontinue trazodone  -Increase flomax to 0.8 mg for urinary retention, unlikely to be amenable to straight cath  -If doing well can trial off restraints 2/24 PM     Suspected cellulitis L Hand/Wrist  Reported treatment for cellulitis this past week at an outside urgent care with oral cephalexin. Patient's son notes that over the last week, swelling, redness, and pain have slowly progressed. XR on admission negative for acute fracture or osteomyelitis. Unfortunately, MRI was non-diagnostic due to profound motion artifact due agitation of the patient. CT on admission demonstrating diffuse soft tissue edema over dorsum of L hand and wrist most consistent with cellulitis and no drainable soft tissue collection. Some concern for poss. Pseudogout vs. Intraarticular infection however no clear evidence of osteomyelitis. Overall vitals stable, labs remarkable for CRP of 16, lactic acid of 2.2. WBC normal.  No clear lesions, pustules, or draining abscess. Overall exam appears to be improving on L hand, swelling and erythema has improved and continues to improve 2/24  - Cont. Empiric vancomycin + zosyn  - follow blood cultures (NGTD)  - scheduled tylenol for pain control  - NS @ 100mL/hr   -Consider ortho consult to tap joint if not improving with broad spectrum abx.     T2DM  Blood sugar on admission 262, most recent A1c of  9.7 over 6 months ago. PTA medications include 10 mg glipizide, 2000mg metformin daily.  Fasting  this AM, has gotten 2 units aspart x2  -Will add lantus for longer duration coverage 2/23              -TDD estimated ~50 units   -Increase lantus to 12 units daily 2/24  - repeat A1c 9.1, slightly improved from prior  - Adjust to high dose sliding scale insulin, defer mealtime as patient reported to not be eating last evening or this morning     Lewy-body dementia  Delirium  Oriented to self only, has been persistently aggressive towards staff while admitted. Olanzapine relatively contraindicated in LBD.   Got ativan and haldol 2/22 due to agitation, and then required restraints due to aggressive behavior 2/23-2/24, also required PRN haldol, zyprexa overnight  -Stop Trazodone 100 mg nightly  -Seroquel 25 mg AM, 50 mg PM scheduled  -Cont. Soft restraints BL UE, reassess this evening, consider 1:1  -Care management consulted for disposition planning  -Defer PT/OT eval until patient is off of restraints    Chronic Kidney Disease, stage 3a  Cr. Improving 1.44>1.23, GFR 48>58 2/24  -Continue to monitor daily BMP    Hypertension  -'s-190's 2/23-2/24, initially refusing oral meds including his metoprolol however was able to receive this AM  -IV hydralazine available for SBP >180, favor this over labetalol given HR 50's        Diet: Combination Diet Regular Diet Adult    DVT Prophylaxis: Enoxaparin (Lovenox) SQ  Rowe Catheter: Not present  Fluids: NS @ 100 mL/hr, poor PO intake this hospitalization  Lines: None     Cardiac Monitoring: None  Code Status: Full Code      Clinically Significant Risk Factors                   # Hypertension: Noted on problem list     # Dementia: noted on problem list       # DMII: A1C = 9.1 % (Ref range: <5.7 %) within past 6 months, PRESENT ON ADMISSION             Social Drivers of Health   Physical Activity: Inactive (4/8/2024)    Exercise Vital Sign     Days of Exercise per Week: 0  days     Minutes of Exercise per Session: 0 min   Interpersonal Safety: High Risk (2/23/2025)    Interpersonal Safety     Do you feel physically and emotionally safe where you currently live?: No     Within the past 12 months, have you been hit, slapped, kicked or otherwise physically hurt by someone?: No     Within the past 12 months, have you been humiliated or emotionally abused in other ways by your partner or ex-partner?: No   Stress: Stress Concern Present (4/8/2024)    [employer] [place] [employer] - Occupational Stress Questionnaire     Feeling of Stress : Rather much   Social Connections: Unknown (4/8/2024)    Social Connection and Isolation Panel [NHANES]     Frequency of Social Gatherings with Friends and Family: Never         Disposition Plan     Medically Ready for Discharge: Anticipated in 2-4 Days         The patient's care was discussed with the Attending Physician, Dr. Tierney .    Andrea Booker MD  Hospitalist Service  [facility]  Securely message with M87 (more info)  Text page via [facility] Paging/Directory   ______________________________________________________________________    Interval History   Patient had very eventful night overnight, increasingly agitated, received melatonin and seroquel without effect and was unable to be redirected and attempting to hit staff. Trialed zyprexa, haldol, and then required to be held down and placed in soft restraints. Apparently had been urinating in the room and deferred catheter.    He is much calmer this morning, able to arouse but quickly falls back asleep. Will get CM involved for disposition planning as he has legal guardianship. Continue soft restraints, can re-eval tonight.      Physical Exam   Vital Signs: Temp: 97.8  F (36.6  C) Temp src: Oral BP: (!) 183/92 (Dr. Booker aware and here. Ordering Hydralazine IV.) Pulse: 58   Resp: 20 SpO2: 94 % O2 Device: None (Room air)    Weight: 218 lbs 0 oz    GEN: Drowsy,  moderately cooperative on exam. No acute distress. In 4 point soft restraints. Arouses but quickly falls back asleep.  HEENT: Normocephalic, atraumatic.  NECK: Supple. No cervical or supraclavicular adenopathy.   PULM: Non-labored breathing. No use of accessory muscles. Clear to ausculation bilaterally. No wheezes or crackles.   CV: Regular rate and rhythm. Normal S1, S2. No rubs, murmurs, or gallops.    ABDOMEN: Normoactive bowel sounds. Non-tender to palpation. Non-distended.    EXTREMITES:  Left hand with diffuse erythema and swelling. Warm and Mildly tender to palpation. Does not extend past previously drawn margins on admission. Appears to be improving from exam yesterday.  NEURO:  Awake. Oriented to person only.   PSYCH: Dementia.         Data         Imaging results reviewed over the past 24 hrs:   No results found for this or any previous visit (from the past 24 hours).    Andrea Booker MD  PGY-2  [facility] Medicine Residency  [facility]  February 24, 2025     Faculty Supervision of Residents   I have examined this patient, labs, documentation and imaging. The medical care has been evaluated and discussed with the resident.  I am agreement with resident documentation which reflects our discussion and decision making.     DOS: 02/24/25    Mahi Tierney MD

## 2025-02-24 NOTE — PROGRESS NOTES
Pt woke up at 0240 and started to get out of the bed, when tried to stop him he started kicking the staff. PRN Zyprexa given at 0247 without effect. He was kicking and swinging his arms when the writer was trying to fix the IV and he hit the writer. Pt is strong even with 4 staff holding him. Haldol 2 mg IV was given at 0305.Pt is sleeping now.

## 2025-02-24 NOTE — PLAN OF CARE
Problem: Delirium  Goal: Optimal Coping  Outcome: Progressing  Goal: Improved Behavioral Control  Outcome: Progressing  Goal: Improved Attention and Thought Clarity  Outcome: Progressing  Goal: Improved Sleep  Outcome: Progressing     Problem: Infection  Goal: Absence of Infection Signs and Symptoms  Outcome: Progressing     Problem: Pain Acute  Goal: Optimal Pain Control and Function  Outcome: Progressing  Intervention: Optimize Psychosocial Wellbeing  Recent Flowsheet Documentation  Taken 2/23/2025 3820 by Glory Loera RN  Diversional Activities: television   Goal Outcome Evaluation:       Pt is alert and oriented to self only. Lung sounds clear, on RA., MD aware, pt is asymptomatic, will continue to monitor. Pt was agitated at 0030, he wants to go home, Melatonin and Seroquel given. Snacks given at 0100, pt ate 100 % of the turkey sandwich. After eating pt again  is trying to get up in the bed, took his shoes and wanted to get out of the room. Unable to redirect him, he was aggressive and tried to hit the writer with his arm. Called for help, staff came to pacify the pt and around 0140 he finally went to sleep.

## 2025-02-25 LAB
ANION GAP SERPL CALCULATED.3IONS-SCNC: 11 MMOL/L (ref 7–15)
BUN SERPL-MCNC: 20.3 MG/DL (ref 8–23)
CALCIUM SERPL-MCNC: 9.8 MG/DL (ref 8.8–10.4)
CHLORIDE SERPL-SCNC: 104 MMOL/L (ref 98–107)
CREAT SERPL-MCNC: 1.26 MG/DL (ref 0.67–1.17)
EGFRCR SERPLBLD CKD-EPI 2021: 57 ML/MIN/1.73M2
ERYTHROCYTE [DISTWIDTH] IN BLOOD BY AUTOMATED COUNT: 13 % (ref 10–15)
GLUCOSE BLDC GLUCOMTR-MCNC: 222 MG/DL (ref 70–99)
GLUCOSE BLDC GLUCOMTR-MCNC: 228 MG/DL (ref 70–99)
GLUCOSE BLDC GLUCOMTR-MCNC: 251 MG/DL (ref 70–99)
GLUCOSE BLDC GLUCOMTR-MCNC: 281 MG/DL (ref 70–99)
GLUCOSE BLDC GLUCOMTR-MCNC: 289 MG/DL (ref 70–99)
GLUCOSE SERPL-MCNC: 239 MG/DL (ref 70–99)
HCO3 SERPL-SCNC: 25 MMOL/L (ref 22–29)
HCT VFR BLD AUTO: 45 % (ref 40–53)
HGB BLD-MCNC: 15.5 G/DL (ref 13.3–17.7)
HOLD SPECIMEN: NORMAL
MCH RBC QN AUTO: 32.4 PG (ref 26.5–33)
MCHC RBC AUTO-ENTMCNC: 34.4 G/DL (ref 31.5–36.5)
MCV RBC AUTO: 94 FL (ref 78–100)
PLATELET # BLD AUTO: 243 10E3/UL (ref 150–450)
PLATELET # BLD AUTO: 243 10E3/UL (ref 150–450)
POTASSIUM SERPL-SCNC: 4.2 MMOL/L (ref 3.4–5.3)
RBC # BLD AUTO: 4.78 10E6/UL (ref 4.4–5.9)
SODIUM SERPL-SCNC: 140 MMOL/L (ref 135–145)
VANCOMYCIN SERPL-MCNC: 9 UG/ML
WBC # BLD AUTO: 14 10E3/UL (ref 4–11)

## 2025-02-25 PROCEDURE — 250N000013 HC RX MED GY IP 250 OP 250 PS 637

## 2025-02-25 PROCEDURE — 99233 SBSQ HOSP IP/OBS HIGH 50: CPT | Mod: GC

## 2025-02-25 PROCEDURE — 82565 ASSAY OF CREATININE: CPT

## 2025-02-25 PROCEDURE — 250N000011 HC RX IP 250 OP 636: Performed by: STUDENT IN AN ORGANIZED HEALTH CARE EDUCATION/TRAINING PROGRAM

## 2025-02-25 PROCEDURE — 250N000011 HC RX IP 250 OP 636

## 2025-02-25 PROCEDURE — 80202 ASSAY OF VANCOMYCIN: CPT | Performed by: STUDENT IN AN ORGANIZED HEALTH CARE EDUCATION/TRAINING PROGRAM

## 2025-02-25 PROCEDURE — 85014 HEMATOCRIT: CPT

## 2025-02-25 PROCEDURE — 85049 AUTOMATED PLATELET COUNT: CPT | Performed by: STUDENT IN AN ORGANIZED HEALTH CARE EDUCATION/TRAINING PROGRAM

## 2025-02-25 PROCEDURE — 80048 BASIC METABOLIC PNL TOTAL CA: CPT

## 2025-02-25 PROCEDURE — 36415 COLL VENOUS BLD VENIPUNCTURE: CPT | Performed by: STUDENT IN AN ORGANIZED HEALTH CARE EDUCATION/TRAINING PROGRAM

## 2025-02-25 PROCEDURE — 120N000001 HC R&B MED SURG/OB

## 2025-02-25 RX ORDER — VANCOMYCIN HYDROCHLORIDE 1 G/200ML
1000 INJECTION, SOLUTION INTRAVENOUS EVERY 12 HOURS
Status: DISCONTINUED | OUTPATIENT
Start: 2025-02-25 | End: 2025-02-26

## 2025-02-25 RX ADMIN — TAMSULOSIN HYDROCHLORIDE 0.8 MG: 0.4 CAPSULE ORAL at 07:43

## 2025-02-25 RX ADMIN — PREGABALIN 50 MG: 50 CAPSULE ORAL at 00:01

## 2025-02-25 RX ADMIN — QUETIAPINE 50 MG: 25 TABLET ORAL at 20:25

## 2025-02-25 RX ADMIN — METOPROLOL SUCCINATE 25 MG: 25 TABLET, EXTENDED RELEASE ORAL at 07:43

## 2025-02-25 RX ADMIN — CETIRIZINE HYDROCHLORIDE 10 MG: 10 TABLET, FILM COATED ORAL at 07:43

## 2025-02-25 RX ADMIN — FINASTERIDE 5 MG: 5 TABLET, FILM COATED ORAL at 07:44

## 2025-02-25 RX ADMIN — PREGABALIN 50 MG: 50 CAPSULE ORAL at 07:43

## 2025-02-25 RX ADMIN — VANCOMYCIN HYDROCHLORIDE 1000 MG: 1 INJECTION, SOLUTION INTRAVENOUS at 09:54

## 2025-02-25 RX ADMIN — ACETAMINOPHEN 650 MG: 325 TABLET ORAL at 16:34

## 2025-02-25 RX ADMIN — PREGABALIN 50 MG: 50 CAPSULE ORAL at 20:25

## 2025-02-25 RX ADMIN — PIPERACILLIN AND TAZOBACTAM 3.38 G: 3; .375 INJECTION, POWDER, FOR SOLUTION INTRAVENOUS at 17:12

## 2025-02-25 RX ADMIN — ENOXAPARIN SODIUM 40 MG: 40 INJECTION SUBCUTANEOUS at 16:38

## 2025-02-25 RX ADMIN — VANCOMYCIN HYDROCHLORIDE 1000 MG: 1 INJECTION, SOLUTION INTRAVENOUS at 20:26

## 2025-02-25 RX ADMIN — ACETAMINOPHEN 650 MG: 325 TABLET ORAL at 00:20

## 2025-02-25 RX ADMIN — ACETAMINOPHEN 650 MG: 325 TABLET ORAL at 20:25

## 2025-02-25 RX ADMIN — HYDRALAZINE HYDROCHLORIDE 10 MG: 20 INJECTION INTRAMUSCULAR; INTRAVENOUS at 14:06

## 2025-02-25 RX ADMIN — QUETIAPINE 25 MG: 25 TABLET ORAL at 07:43

## 2025-02-25 RX ADMIN — PIPERACILLIN AND TAZOBACTAM 3.38 G: 3; .375 INJECTION, POWDER, FOR SOLUTION INTRAVENOUS at 07:45

## 2025-02-25 RX ADMIN — DONEPEZIL HYDROCHLORIDE 5 MG: 5 TABLET ORAL at 00:00

## 2025-02-25 RX ADMIN — ASPIRIN 81 MG: 81 TABLET, CHEWABLE ORAL at 07:44

## 2025-02-25 RX ADMIN — ACETAMINOPHEN 650 MG: 325 TABLET ORAL at 07:44

## 2025-02-25 RX ADMIN — Medication 1 MG: at 21:07

## 2025-02-25 RX ADMIN — DONEPEZIL HYDROCHLORIDE 5 MG: 5 TABLET ORAL at 20:27

## 2025-02-25 RX ADMIN — FLUOXETINE HYDROCHLORIDE 10 MG: 10 CAPSULE ORAL at 07:45

## 2025-02-25 ASSESSMENT — ACTIVITIES OF DAILY LIVING (ADL)
ADLS_ACUITY_SCORE: 78
ADLS_ACUITY_SCORE: 78
ADLS_ACUITY_SCORE: 87
ADLS_ACUITY_SCORE: 78
ADLS_ACUITY_SCORE: 78
ADLS_ACUITY_SCORE: 82
ADLS_ACUITY_SCORE: 78
ADLS_ACUITY_SCORE: 87
ADLS_ACUITY_SCORE: 82
ADLS_ACUITY_SCORE: 78
ADLS_ACUITY_SCORE: 87
ADLS_ACUITY_SCORE: 82
ADLS_ACUITY_SCORE: 78
ADLS_ACUITY_SCORE: 78
ADLS_ACUITY_SCORE: 82
ADLS_ACUITY_SCORE: 75
ADLS_ACUITY_SCORE: 78
ADLS_ACUITY_SCORE: 87

## 2025-02-25 NOTE — PLAN OF CARE
- Alert to self only. SBP of <180. Standby PRN hydralazine. Room air. Spot 02 check >90%. Denies of pain. Hard of hearing. Can be agitated and irritated during cares. Challenge to redirect patient. Unable to remember current situation. Bilateral wrist restraints and right ankle restraint is on. 1:1 sitter for patient safety. left hand remains swollen and red/bruises. Sleeve on applied and helpful. Slept on and off tonight. Will monitor closely.    Problem: Hypertension Acute  Goal: Blood Pressure Within Desired Range  Outcome: Progressing  Intervention: Normalize Blood Pressure     Problem: Restraint, Nonviolent  Goal: Absence of Harm or Injury  Outcome: Progressing  Intervention: Implement Least Restrictive Safety Strategies  Intervention: Protect Skin and Joint Integrity     Problem: Delirium  Goal: Optimal Coping  Intervention: Optimize Psychosocial Adjustment to Delirium  Goal: Improved Behavioral Control  Intervention: Prevent and Manage Agitation  Intervention: Minimize Safety Risk  Goal: Improved Attention and Thought Clarity  Intervention: Maximize Cognitive Function    Goal: Improved Sleep  Outcome: Progressing

## 2025-02-25 NOTE — PLAN OF CARE
Patient is on 1:1 for safety, patient is confused alert and oriented to self only. Pt was weaned off restraints today, but had increased agitation, aggressiveness, pulling on IV and restlessness in the evening. Provider notified and restarted restraints. Patient's daughter notified, patient refused night time BG check and PO medication. Patient kicked writer while attempting to re-direct. Pt was using urinal during the day to void and got up with an assist of 1 to the bathroom to void. Patient has incontinent of urine at night. Scheduled Seroquel was given.     Aliyah Brian RN      Problem: Adult Inpatient Plan of Care  Goal: Plan of Care Review  Description: The Plan of Care Review/Shift note should be completed every shift.  The Outcome Evaluation is a brief statement about your assessment that the patient is improving, declining, or no change.  This information will be displayed automatically on your shift  note.  Outcome: Progressing   Goal Outcome Evaluation:

## 2025-02-25 NOTE — PROGRESS NOTES
[facility]    Progress Note - Hospitalist Service       Date of Admission:  2/22/2025    Assessment & Plan   Nestor Peterson is a 84 year old male with past medical history of lewy body dementia, T2DM, HTN, BPH admitted on 2/22/2025 with cellulitis of the left hand/wrist +/- possible pseudogout vs. Pyrophosphate, no evidence of osteomyelitis. Behavioral agitation/delirium in setting of dementia.     Updates 2/25:  -Continued restraints, although sounds like was not as agitated last PM. Did require restraint on RLE  -Was placed in trendelenburg which is not appropriate management of agitaiton, please do not repeat this overnight   -Scheduled Seroquel 25 mg AM, 50 mg at bedtime, please avoid additional antipsychotics  -Increase flomax to 0.8 mg for urinary retention, unlikely to be amenable to straight cath  -If doing well can trial off restraints 2/25 PM     Suspected cellulitis L Hand/Wrist  Reported treatment for cellulitis this past week at an outside urgent care with oral cephalexin. Patient's son notes that over the last week, swelling, redness, and pain have slowly progressed. XR on admission negative for acute fracture or osteomyelitis. Unfortunately, MRI was non-diagnostic due to profound motion artifact due agitation of the patient. CT on admission demonstrating diffuse soft tissue edema over dorsum of L hand and wrist most consistent with cellulitis and no drainable soft tissue collection. Some concern for poss. Pseudogout vs. Intraarticular infection however no clear evidence of osteomyelitis. Overall vitals stable, labs remarkable for CRP of 16, lactic acid of 2.2. WBC normal.  No clear lesions, pustules, or draining abscess. L hand exam stable, still c/o pain but seems to be improving. Had dressing over hand this AM. Developed new leukocytosis 2/25 WBC 14.0, afebrile, overall infectious source appears to be improving. Bcx NGTD.  - Cont. Empiric vancomycin + zosyn  - follow  blood cultures (NGTD)  - scheduled tylenol for pain control  - NS @ 100mL/hr      T2DM  Blood sugar on admission 262, most recent A1c of 9.7 over 6 months ago. PTA medications include 10 mg glipizide, 2000mg metformin daily.  Fasting  this AM, has gotten 2 units aspart x2  -Increase lantus to 15 units daily  - repeat A1c 9.1, slightly improved from prior  -HDSSI  -1:10 carb count TID w/meals  -Consistent carb count diabetic diet     Lewy-body dementia  Delirium  Oriented to self only, has been persistently aggressive towards staff while admitted. Olanzapine relatively contraindicated in LBD.   Got ativan and haldol 2/22 due to agitation, and then required restraints due to aggressive behavior 2/23-2/24, also required PRN haldol, zyprexa overnight  -Seroquel 25 mg AM, 50 mg PM scheduled  -Cont. Soft restraints BL UE, reassess this evening, consider 1:1 if needed  -Care management consulted for disposition planning  -Defer PT/OT eval until patient is off of restraints     Chronic Kidney Disease, stage 3a  Cr. Improving 1.44>1.23>1.26, GFR 48>58>57 2/24, overall stable. BUN:Cr >20:1 favor pre-renal  -Encourage PO fluid intake  -Continue to monitor daily BMP     Hypertension  -'s-190's 2/23-2/24, initially refusing oral meds including his metoprolol however was able to receive this AM  -IV hydralazine available for SBP >180, favor this over labetalol given HR 50's        Diet: Moderate Consistent Carb (60 g CHO per Meal) Diet    DVT Prophylaxis: Enoxaparin (Lovenox) SQ  Rowe Catheter: Not present  Fluids: PO  Lines: None     Cardiac Monitoring: None  Code Status: No CPR- Do NOT Intubate      Clinically Significant Risk Factors                   # Hypertension: Noted on problem list     # Dementia: noted on problem list       # DMII: A1C = 9.1 % (Ref range: <5.7 %) within past 6 months, PRESENT ON ADMISSION             Social Drivers of Health   Food Insecurity: Unknown (2/24/2025)    Food Insecurity      Within the past 12 months, did you worry that your food would run out before you got money to buy more?: Patient unable to answer     Within the past 12 months, did the food you bought just not last and you didn t have money to get more?: Patient unable to answer   Housing Stability: Unknown (2/24/2025)    Housing Stability     Do you have housing? : Patient unable to answer     Are you worried about losing your housing?: Patient unable to answer   Financial Resource Strain: Unknown (2/24/2025)    Financial Resource Strain     Within the past 12 months, have you or your family members you live with been unable to get utilities (heat, electricity) when it was really needed?: Patient unable to answer   Transportation Needs: Unknown (2/24/2025)    Transportation Needs     Within the past 12 months, has lack of transportation kept you from medical appointments, getting your medicines, non-medical meetings or appointments, work, or from getting things that you need?: Patient unable to answer   Physical Activity: Inactive (4/8/2024)    Exercise Vital Sign     Days of Exercise per Week: 0 days     Minutes of Exercise per Session: 0 min   Interpersonal Safety: High Risk (2/23/2025)    Interpersonal Safety     Do you feel physically and emotionally safe where you currently live?: No     Within the past 12 months, have you been hit, slapped, kicked or otherwise physically hurt by someone?: No     Within the past 12 months, have you been humiliated or emotionally abused in other ways by your partner or ex-partner?: No   Stress: Stress Concern Present (4/8/2024)    [employer] [place] [employer] - Occupational Stress Questionnaire     Feeling of Stress : Rather much   Social Connections: Unknown (4/8/2024)    Social Connection and Isolation Panel [NHANES]     Frequency of Social Gatherings with Friends and Family: Never         Disposition Plan     Medically Ready for Discharge: Anticipated in 2-4 Days         The  patient's care was discussed with the Attending Physician, Dr. Tierney .    Andrea Booker MD  Hospitalist Service  [facility]  Securely message with [employer] (more info)  Text page via [facility] Paging/Directory   ______________________________________________________________________    Interval History   Slightly agitated last night, required 1:1 and additional restraint to RLE due to kicking, otherwise slept through most of the night. Ideally with scheduled seroquel hope to remove restraints soon, however he is more agitated with BG and lab checks and vitals checks. Has been eating well per bedside RN, will add mealtime insulin coverage today to better control BG.    Physical Exam   Vital Signs: Temp: 98  F (36.7  C) Temp src: Axillary BP: (!) 165/80 Pulse: 83   Resp: 18 SpO2: 93 % O2 Device: None (Room air)    Weight: 218 lbs 0 oz      GEN: Drowsy, moderately cooperative on exam. No acute distress. In 4 point soft restraints. Arouses but quickly falls back asleep.  HEENT: Normocephalic, atraumatic.  NECK: Supple. No cervical or supraclavicular adenopathy.   PULM: Non-labored breathing. No use of accessory muscles. Clear to ausculation bilaterally. No wheezes or crackles.   CV: Regular rate and rhythm. Normal S1, S2. No rubs, murmurs, or gallops.    ABDOMEN: Normoactive bowel sounds. Non-tender to palpation. Non-distended.    EXTREMITES:  Left hand with improving erythema and swelling. Warm and Mildly tender to palpation. Does not extend past previously drawn margins on admission. Dressing/sleeve in place over distal LUE  NEURO:  Awake. Oriented to person only.   PSYCH: Dementia.       Data     I have personally reviewed the following data over the past 24 hrs:    14.0 (H)  \   15.5   / 243; 243     140 104 20.3 /  251 (H)   4.2 25 1.26 (H) \       Imaging results reviewed over the past 24 hrs:   No results found for this or any previous visit (from the past 24 hours).    Andrea Booker,  MD  PGY-2  [facility]  [facility]  February 25, 2025

## 2025-02-25 NOTE — PROGRESS NOTES
"CLINICAL NUTRITION SERVICES - ASSESSMENT NOTE    RECOMMENDATIONS FOR MDs/PROVIDERS TO ORDER:  None    Malnutrition Status:    Moderate malnutrition in the context of chronic illness  Malnutrition Present on Admission: Yes    Registered Dietitian Interventions:  New weight    Future/Additional Recommendations:  Add supplement if intake is inadequate       REASON FOR ASSESSMENT  Positive admission nutrition risk screen with \"unsure\" of weight loss.     Pt presents with hand/wrist cellulitis  Hx dementia, DM2, CKD3    SUBJECTIVE INFORMATION  Assessed patient in room. Pt sound asleep, even through physical exam. Pt snoring and twitching.   Lunch at bedside, unable to eat at this time.   No family at bedside    NUTRITION HISTORY  Pt resides in memory care  Decreasing cognition, ambulation and increasing behaviors past couple of months.    CURRENT NUTRITION ORDERS  Diet: Orders Placed This Encounter      Moderate Consistent Carb (60 g CHO per Meal) Diet  Pt was on regular diet-diet restriction added this am per MD      CURRENT INTAKE/TOLERANCE  -Good.  -Eating 100% at breakfast and lunch x 2 days. Supper intakes not documented- received 3069-2732 kcal,  g protein over 3 meals  -Ate 50% at breakfast today and sleeping through lunch    LABS  Nutrition-relevant labs:   Cr 1.26 (H), improving  A1C 9.1 (H) 2/22  -336 mg/dl past 24 hours, in poor control-exacerbated by infection    MEDICATIONS  Nutrition-relevant medications:   Ssi, novolog 1u: 10 g cho, lantus daily, iv abx    ANTHROPOMETRICS  Height: 177.8 cm (5' 10\")  Most Recent Weight: 98.9 kg (218 lb)- stated?  IBW: 75.45 kg  % IBW: 131%  BMI (kg/m ): Obesity Class I BMI 30-34.9  Weight History: minimal wt hx  Wt Readings from Last 10 Encounters:   04/10/24 93.6 kg (206 lb 6.4 oz)- office   09/18/23 94.3 kg (208 lb)   03/15/23 92.6 kg (204 lb 2 oz)       Dosing Weight: 98.9 kg, based on actual wt for energy, 75.45 IBW for protein/fluid    ASSESSED NUTRITION " NEEDS  Estimated Energy Needs: 2040 kcals/day ([place] Jeor x 1.2 stress factor)  Justification: Maintenance  Estimated Protein Needs: 75-90 grams protein/day (1 - 1.2 grams of pro/kg)  Justification: Hypercatabolism with acute illness  Estimated Fluid Needs: 7659-1324 mL/day (25 - 30 mL/kg)  Justification: Maintenance    SYSTEM FINDINGS    Nails were very short  Skin/wounds: no open areas  GI symptoms: Last BM 2/23 x 2 loose    MALNUTRITION  % Intake:Unable to assess  % Weight Loss: None noted but may need new weight  Subcutaneous Fat Loss: Fat overlying the ribs: Mild  Muscle Loss: Wasting of the temples (temporalis muscle): Moderate, Clavicles (pectoralis and deltoids): Moderate, Shoulders (deltoids): Moderate, Interosseous muscles: Moderate, Thigh (quadriceps): Moderate, and Calf (gastrocnemius): Mild  Fluid Accumulation/Edema: None noted  Malnutrition Diagnosis: Moderate malnutrition in the context of chronic illness  Malnutrition Present on Admission: Yes    NUTRITION DIAGNOSIS  Malnutrition (undernutrition) related to dementia as evidenced by mild fat and moderate muscle loss    Goals  Meet > 75% of estimated nutrition needs  Maintain weight     Monitoring/Evaluation  Progress toward goals will be monitored and evaluated per policy.

## 2025-02-25 NOTE — PHARMACY-VANCOMYCIN DOSING SERVICE
"Pharmacy Vancomycin Note  Date of Service 2025  Patient's  1940   84 year old, male    Indication: Skin and Soft Tissue Infection  Day of Therapy: 4  Current vancomycin regimen:  1250 mg IV q24h  Current vancomycin monitoring method: AUC  Current vancomycin therapeutic monitoring goal: 400-600 mg*h/L    InsightRX Prediction of Current Vancomycin Regimen  Loading dose: N/A  Regimen: 1250 mg IV every 24 hours.  Start time: 10:36 on 2025  Exposure target: AUC24 (range)400-600 mg/L.hr   AUC24,ss: 336 mg/L.hr  Probability of AUC24 > 400: 12 %  Ctrough,ss: 8.7 mg/L  Probability of Ctrough,ss > 20: 0 %  Probability of nephrotoxicity (Lodise TYRESE ): 5 %    Current estimated CrCl = Estimated Creatinine Clearance: 52.4 mL/min (A) (based on SCr of 1.26 mg/dL (H)).    Creatinine for last 3 days  2025:  9:27 AM Creatinine 1.44 mg/dL;  9:27 AM Creatinine 1.44 mg/dL  2025:  7:18 AM Creatinine 1.23 mg/dL  2025:  6:17 AM Creatinine 1.26 mg/dL    Recent Vancomycin Levels (past 3 days)  2025:  6:16 AM Vancomycin 9.0 ug/mL    Vancomycin IV Administrations (past 72 hours)                     vancomycin (VANCOCIN) 1,250 mg in 0.9% NaCl 262.5 mL intermittent infusion (mg) 1,250 mg New Bag 25 1036     1,250 mg New Bag 25 1000    vancomycin (VANCOCIN) 2,000 mg in 0.9% NaCl 520 mL intermittent infusion (mg) 2,000 mg New Bag 25 1014                    Nephrotoxins and other renal medications (From now, onward)      Start     Dose/Rate Route Frequency Ordered Stop    25 1000  vancomycin (VANCOCIN) 1,250 mg in 0.9% NaCl 262.5 mL intermittent infusion         1,250 mg  over 90 Minutes Intravenous EVERY 24 HOURS 25 1508      25 1600  piperacillin-tazobactam (ZOSYN) 3.375 g vial to attach to  mL bag        Note to Pharmacy: For [facility], [facility] and [facility]: For Zosyn-naive patients, use the \"Zosyn initial dose + extended infusion\" order panel.    3.375 g  over 240 " Minutes Intravenous EVERY 8 HOURS 02/22/25 1454                 Contrast Orders - past 72 hours (72h ago, onward)      Start     Dose/Rate Route Frequency Stop    02/22/25 1630  iopamidol (ISOVUE-370) solution 75 mL         75 mL Intravenous ONCE 02/22/25 1610            Interpretation of levels and current regimen:  Vancomycin level is reflective of AUC less than 400    Has serum creatinine changed greater than 50% in last 72 hours: No    Urine output:  good urine output    Renal Function: Stable    InsightRX Prediction of Planned New Vancomycin Regimen  Loading dose: N/A  Regimen: 1000 mg IV every 12 hours.  Start time: 10:36 on 02/25/2025  Exposure target: AUC24 (range)400-600 mg/L.hr   AUC24,ss: 527 mg/L.hr  Probability of AUC24 > 400: 97 %  Ctrough,ss: 16.9 mg/L  Probability of Ctrough,ss > 20: 15 %  Probability of nephrotoxicity (Lodise TYRESE 2009): 13 %    Plan:  Increase Dose to 1000 mg IV q12h  Vancomycin monitoring method: AUC  Vancomycin therapeutic monitoring goal: 400-600 mg*h/L  Pharmacy will check vancomycin levels as appropriate in 1-3 Days.  Serum creatinine levels will be ordered daily for the first week of therapy and at least twice weekly for subsequent weeks.    Mini Burt, PharmD, BCPS 02/25/25 8:40 AM

## 2025-02-26 LAB
ANION GAP SERPL CALCULATED.3IONS-SCNC: 13 MMOL/L (ref 7–15)
BUN SERPL-MCNC: 21 MG/DL (ref 8–23)
CALCIUM SERPL-MCNC: 9.6 MG/DL (ref 8.8–10.4)
CHLORIDE SERPL-SCNC: 101 MMOL/L (ref 98–107)
CREAT SERPL-MCNC: 1.36 MG/DL (ref 0.67–1.17)
EGFRCR SERPLBLD CKD-EPI 2021: 52 ML/MIN/1.73M2
ERYTHROCYTE [DISTWIDTH] IN BLOOD BY AUTOMATED COUNT: 13.2 % (ref 10–15)
GLUCOSE BLDC GLUCOMTR-MCNC: 154 MG/DL (ref 70–99)
GLUCOSE BLDC GLUCOMTR-MCNC: 193 MG/DL (ref 70–99)
GLUCOSE BLDC GLUCOMTR-MCNC: 234 MG/DL (ref 70–99)
GLUCOSE BLDC GLUCOMTR-MCNC: 235 MG/DL (ref 70–99)
GLUCOSE SERPL-MCNC: 224 MG/DL (ref 70–99)
HCO3 SERPL-SCNC: 24 MMOL/L (ref 22–29)
HCT VFR BLD AUTO: 48 % (ref 40–53)
HGB BLD-MCNC: 15.7 G/DL (ref 13.3–17.7)
MCH RBC QN AUTO: 31.3 PG (ref 26.5–33)
MCHC RBC AUTO-ENTMCNC: 32.7 G/DL (ref 31.5–36.5)
MCV RBC AUTO: 96 FL (ref 78–100)
PLATELET # BLD AUTO: 249 10E3/UL (ref 150–450)
POTASSIUM SERPL-SCNC: 4.1 MMOL/L (ref 3.4–5.3)
RBC # BLD AUTO: 5.01 10E6/UL (ref 4.4–5.9)
SODIUM SERPL-SCNC: 138 MMOL/L (ref 135–145)
WBC # BLD AUTO: 11.3 10E3/UL (ref 4–11)

## 2025-02-26 PROCEDURE — 250N000013 HC RX MED GY IP 250 OP 250 PS 637

## 2025-02-26 PROCEDURE — 85027 COMPLETE CBC AUTOMATED: CPT

## 2025-02-26 PROCEDURE — 250N000011 HC RX IP 250 OP 636: Performed by: STUDENT IN AN ORGANIZED HEALTH CARE EDUCATION/TRAINING PROGRAM

## 2025-02-26 PROCEDURE — 36415 COLL VENOUS BLD VENIPUNCTURE: CPT

## 2025-02-26 PROCEDURE — 120N000001 HC R&B MED SURG/OB

## 2025-02-26 PROCEDURE — 80048 BASIC METABOLIC PNL TOTAL CA: CPT

## 2025-02-26 PROCEDURE — 250N000011 HC RX IP 250 OP 636

## 2025-02-26 PROCEDURE — 99232 SBSQ HOSP IP/OBS MODERATE 35: CPT | Mod: GC

## 2025-02-26 RX ORDER — QUETIAPINE FUMARATE 100 MG/1
100 TABLET, FILM COATED ORAL AT BEDTIME
Status: DISPENSED | OUTPATIENT
Start: 2025-02-26

## 2025-02-26 RX ORDER — QUETIAPINE FUMARATE 25 MG/1
50 TABLET, FILM COATED ORAL EVERY MORNING
Status: DISPENSED | OUTPATIENT
Start: 2025-02-27

## 2025-02-26 RX ORDER — PENICILLIN V POTASSIUM 250 MG/1
500 TABLET, FILM COATED ORAL EVERY 6 HOURS SCHEDULED
Status: DISPENSED | OUTPATIENT
Start: 2025-02-26

## 2025-02-26 RX ADMIN — FINASTERIDE 5 MG: 5 TABLET, FILM COATED ORAL at 08:15

## 2025-02-26 RX ADMIN — TAMSULOSIN HYDROCHLORIDE 0.8 MG: 0.4 CAPSULE ORAL at 08:15

## 2025-02-26 RX ADMIN — PENICILLIN V POTASSIUM 500 MG: 250 TABLET, FILM COATED ORAL at 23:47

## 2025-02-26 RX ADMIN — QUETIAPINE 25 MG: 25 TABLET ORAL at 08:15

## 2025-02-26 RX ADMIN — METOPROLOL SUCCINATE 25 MG: 25 TABLET, EXTENDED RELEASE ORAL at 08:15

## 2025-02-26 RX ADMIN — PIPERACILLIN AND TAZOBACTAM 3.38 G: 3; .375 INJECTION, POWDER, FOR SOLUTION INTRAVENOUS at 00:42

## 2025-02-26 RX ADMIN — PENICILLIN V POTASSIUM 500 MG: 250 TABLET, FILM COATED ORAL at 18:06

## 2025-02-26 RX ADMIN — ACETAMINOPHEN 650 MG: 325 TABLET ORAL at 19:55

## 2025-02-26 RX ADMIN — ENOXAPARIN SODIUM 40 MG: 40 INJECTION SUBCUTANEOUS at 18:06

## 2025-02-26 RX ADMIN — CETIRIZINE HYDROCHLORIDE 10 MG: 10 TABLET, FILM COATED ORAL at 08:15

## 2025-02-26 RX ADMIN — DONEPEZIL HYDROCHLORIDE 5 MG: 5 TABLET ORAL at 21:26

## 2025-02-26 RX ADMIN — FLUOXETINE HYDROCHLORIDE 10 MG: 10 CAPSULE ORAL at 08:15

## 2025-02-26 RX ADMIN — ASPIRIN 81 MG: 81 TABLET, CHEWABLE ORAL at 08:15

## 2025-02-26 RX ADMIN — PIPERACILLIN AND TAZOBACTAM 3.38 G: 3; .375 INJECTION, POWDER, FOR SOLUTION INTRAVENOUS at 08:14

## 2025-02-26 RX ADMIN — PREGABALIN 50 MG: 50 CAPSULE ORAL at 19:55

## 2025-02-26 RX ADMIN — PENICILLIN V POTASSIUM 500 MG: 250 TABLET, FILM COATED ORAL at 13:56

## 2025-02-26 RX ADMIN — QUETIAPINE FUMARATE 100 MG: 100 TABLET ORAL at 20:00

## 2025-02-26 RX ADMIN — VANCOMYCIN HYDROCHLORIDE 1000 MG: 1 INJECTION, SOLUTION INTRAVENOUS at 08:15

## 2025-02-26 RX ADMIN — PREGABALIN 50 MG: 50 CAPSULE ORAL at 08:15

## 2025-02-26 RX ADMIN — ACETAMINOPHEN 650 MG: 325 TABLET ORAL at 13:56

## 2025-02-26 RX ADMIN — ACETAMINOPHEN 650 MG: 325 TABLET ORAL at 08:15

## 2025-02-26 ASSESSMENT — ACTIVITIES OF DAILY LIVING (ADL)
ADLS_ACUITY_SCORE: 82
ADLS_ACUITY_SCORE: 82
ADLS_ACUITY_SCORE: 87
ADLS_ACUITY_SCORE: 75
ADLS_ACUITY_SCORE: 82
ADLS_ACUITY_SCORE: 76
ADLS_ACUITY_SCORE: 87
ADLS_ACUITY_SCORE: 74
ADLS_ACUITY_SCORE: 75
ADLS_ACUITY_SCORE: 82
ADLS_ACUITY_SCORE: 87
ADLS_ACUITY_SCORE: 82
ADLS_ACUITY_SCORE: 74
ADLS_ACUITY_SCORE: 86
ADLS_ACUITY_SCORE: 75
ADLS_ACUITY_SCORE: 87
ADLS_ACUITY_SCORE: 87
ADLS_ACUITY_SCORE: 82
ADLS_ACUITY_SCORE: 87
ADLS_ACUITY_SCORE: 75
ADLS_ACUITY_SCORE: 86
ADLS_ACUITY_SCORE: 87
ADLS_ACUITY_SCORE: 75

## 2025-02-26 NOTE — PROGRESS NOTES
DR. Charles Jones came over to see pt and said will order restrains, pt was kicking me, yelling before the doctor .   Charles Jones - 10:25 pm  I can come lay eyes and reorder if needed

## 2025-02-26 NOTE — PROGRESS NOTES
[facility]    Progress Note - Hospitalist Service       Date of Admission:  2025    Assessment & Plan   Nestor Peterson is a 84 year old male with past medical history of lewy body dementia, T2DM, HTN, BPH admitted on 2025 with cellulitis of the left hand/wrist +/- possible pseudogout vs. Pyrophosphate, no evidence of osteomyelitis. Behavioral agitation/delirium in setting of dementia.     Updates :  -Ortho consult placed due to lack of interval improvement in his L hand infection   -consider repeat L hand CT, will defer until after evaluated by ortho  -Switch from IV vanc/zosyn to PO Penicillin V-K 500 mg QID   -Scheduled Seroquel 50 mg AM, 100 mg at bedtime, please avoid additional antipsychotics  -Increased flomax to 0.8 mg for urinary retention, unlikely to be amenable to straight cath  -Will let restraints   if tolerated and not a threat to staff  -Discharge planning back to memory care with , facility likely will be able to take him back   -Carb count 1:8, lantus 18 units, HDSSI     Suspected cellulitis L Hand/Wrist  Reported treatment for cellulitis this past week at an outside urgent care with oral cephalexin. Patient's son notes that over the last week, swelling, redness, and pain have slowly progressed. XR on admission negative for acute fracture or osteomyelitis. Unfortunately, MRI was non-diagnostic due to profound motion artifact due agitation of the patient. CT on admission demonstrating diffuse soft tissue edema over dorsum of L hand and wrist most consistent with cellulitis and no drainable soft tissue collection. Some concern for poss. Pseudogout vs. Intraarticular infection however no clear evidence of osteomyelitis. Overall vitals stable, labs remarkable for CRP of 16, lactic acid of 2.2. WBC normal.  No clear lesions, pustules, or draining abscess. L hand exam stable, still c/o pain and ROM has slightly improved however does not appear  we are getting much additional interval improvement. Took dressing and restraint off and updated image in media tab. Unclear if rash vs. Ecchymosis vs. Cellulitis at this point. Developed new leukocytosis 2/25 WBC 14.0>improved 11.3 2/26, afebrile, overall infectious source appears to be improving. Bcx NGTD. Consider repeat CT L hand.  -Orthopedic consult placed, recommendations appreciated  - Discontinue vanc and zosyn 2/26   -Start PO Penicillin V-K 500 mg QID  - follow blood cultures (NGTD)  - scheduled tylenol for pain control  - NS @ 100mL/hr      T2DM  Blood sugar on admission 262, most recent A1c of 9.7 over 6 months ago. PTA medications include 10 mg glipizide, 2000mg metformin daily.  BG has been 220-290 the past 24 hours, not yet controlled  -Increase lantus to 18 units daily  - repeat A1c 9.1, slightly improved from prior  -HDSSI  -1:8 carb count TID w/meals  -Consistent carb count diabetic diet     Lewy-body dementia  Delirium  Oriented to self only, has been persistently aggressive towards staff while admitted. Olanzapine relatively contraindicated in LBD.   Got ativan and haldol 2/22 due to agitation, and then required restraints due to aggressive behavior 2/23-2/24, also required PRN haldol, zyprexa overnight  -Seroquel 50 mg AM, 100 mg PM scheduled  -Will trial off restraints 2/26  -Care management consulted for disposition planning    Chronic Kidney Disease, stage 3a  Acute Kidney Injury  Cr. Improving 1.44>1.23>1.26>1.36, GFR 48>58>57>52 overall stable. BUN:Cr >20:1 favor pre-renal  -Encourage PO fluid intake  -Continue to monitor daily BMP  -Discontinued vancomycin as may be contributory to CRISTINA on CKD     Hypertension  -'s on PTA regimen  -IV hydralazine available for SBP >180, favor this over labetalol given HR 50's           Diet: Moderate Consistent Carb (60 g CHO per Meal) Diet    DVT Prophylaxis: Enoxaparin (Lovenox) SQ  Rowe Catheter: Not present  Fluids: PO  Lines: None     Cardiac  "Monitoring: None  Code Status: No CPR- Do NOT Intubate      Clinically Significant Risk Factors                   # Hypertension: Noted on problem list     # Dementia: noted on problem list       # DMII: A1C = 9.1 % (Ref range: <5.7 %) within past 6 months, PRESENT ON ADMISSION  # Obesity: Estimated body mass index is 30.72 kg/m  as calculated from the following:    Height as of this encounter: 1.778 m (5' 10\").    Weight as of this encounter: 97.1 kg (214 lb 1.1 oz)., PRESENT ON ADMISSION  # Moderate Malnutrition: based on nutrition assessment, PRESENT ON ADMISSION          Social Drivers of Health   Food Insecurity: Unknown (2/24/2025)    Food Insecurity     Within the past 12 months, did you worry that your food would run out before you got money to buy more?: Patient unable to answer     Within the past 12 months, did the food you bought just not last and you didn t have money to get more?: Patient unable to answer   Housing Stability: Unknown (2/24/2025)    Housing Stability     Do you have housing? : Patient unable to answer     Are you worried about losing your housing?: Patient unable to answer   Financial Resource Strain: Unknown (2/24/2025)    Financial Resource Strain     Within the past 12 months, have you or your family members you live with been unable to get utilities (heat, electricity) when it was really needed?: Patient unable to answer   Transportation Needs: Unknown (2/24/2025)    Transportation Needs     Within the past 12 months, has lack of transportation kept you from medical appointments, getting your medicines, non-medical meetings or appointments, work, or from getting things that you need?: Patient unable to answer   Physical Activity: Inactive (4/8/2024)    Exercise Vital Sign     Days of Exercise per Week: 0 days     Minutes of Exercise per Session: 0 min   Interpersonal Safety: High Risk (2/23/2025)    Interpersonal Safety     Do you feel physically and emotionally safe where you " currently live?: No     Within the past 12 months, have you been hit, slapped, kicked or otherwise physically hurt by someone?: No     Within the past 12 months, have you been humiliated or emotionally abused in other ways by your partner or ex-partner?: No   Stress: Stress Concern Present (4/8/2024)    [place] [place] [employer] - Occupational Stress Questionnaire     Feeling of Stress : Rather much   Social Connections: Unknown (4/8/2024)    Social Connection and Isolation Panel [NHANES]     Frequency of Social Gatherings with Friends and Family: Never         Disposition Plan     Medically Ready for Discharge: Anticipated Tomorrow         The patient's care was discussed with the Attending Physician, Dr. Tierney .    Andrea Booker MD  Hospitalist Service  [facility]  Securely message with [employer] (more info)  Text page via [facility] Paging/Directory   ______________________________________________________________________    Interval History   Required 4 point restraints and 1:1 sitter last PM. Increase seroquel today, will trial off restraints this afternoon and evening. Adjusting insulin. Consult ortho due to lack of interval improvement in L hand infection and notably still warm and erythematous LUE. Reports pain is improving but still markedly tender over the dorsum of his L hand. Thought maybe restraints causing bruising but this is not evident on his RUE which has also been restrained.    Physical Exam   Vital Signs: Temp: 98.1  F (36.7  C) Temp src: Oral BP: (!) 167/78 Pulse: 73   Resp: 20 SpO2: 92 % O2 Device: None (Room air)    Weight: 214 lbs 1.07 oz    GEN: Drowsy, moderately cooperative on exam. No acute distress. In 4 point soft restraints. Took off restraint on LLE. Arouses but quickly falls back asleep.  HEENT: Normocephalic, atraumatic.  NECK: Supple. No cervical or supraclavicular adenopathy.   PULM: Non-labored breathing. No use of accessory muscles. Clear to  ausculation bilaterally. No wheezes or crackles.   CV: Regular rate and rhythm. Normal S1, S2. No rubs, murmurs, or gallops.    ABDOMEN: Normoactive bowel sounds. Non-tender to palpation. Non-distended.    EXTREMITES:  Left hand with continued erythema and swelling. Removed bandage today and updated image in media tab. Still markedly tender to touch over dorsum L hand more around his 1st MCP joint. Almost appears like ecchymosis vs rash over cellulitis vs. Interval improvement. Warm and  tender to palpation. Does not extend past previously drawn margins on admission. Dressing/sleeve in place over distal LUE  NEURO:  Awake. Oriented to person only.   PSYCH: Dementia. Pleasant and cooperative during my exam      Data     I have personally reviewed the following data over the past 24 hrs:    11.3 (H)  \   15.7   / 249     138 101 21.0 /  235 (H)   4.1 24 1.36 (H) \       Imaging results reviewed over the past 24 hrs:   No results found for this or any previous visit (from the past 24 hours).    Andrea Booker MD  PGY-2  [facility]  [facility]  February 26, 2025

## 2025-02-26 NOTE — PROGRESS NOTES
Paged Resident Doctor, DR. Charles Jones Restrains were released/removed 2000 now pt agitated climbing at the rails of the bed trying to get out of bed and yelling. Pt is hallucinating. Should I put restrains back or PRN?

## 2025-02-26 NOTE — PLAN OF CARE
"Goal Outcome Evaluation:    Patient is confused, alert to self, trying to remove his gown and saying that he wants to go out, patient reassured, patient with 4 point restrains, VSS,  BP < 180, afebrile, stil has redness on the left hand and covered with  sleeve,        Plan of Care Reviewed With: patient    Overall Patient Progress: no changeOverall Patient Progress: no change    Outcome Evaluation: Patient is confused, alert to self, trying to remove his gown, patient  with 4 point restrains, VSS, afebrile, stil has redness on the left hand,      Problem: Adult Inpatient Plan of Care  Goal: Plan of Care Review  Description: The Plan of Care Review/Shift note should be completed every shift.  The Outcome Evaluation is a brief statement about your assessment that the patient is improving, declining, or no change.  This information will be displayed automatically on your shift  note.  2/26/2025 0604 by Jamel Wilder RN  Flowsheets (Taken 2/26/2025 0604)  Outcome Evaluation: Patient is confused, alert to self, trying to remove his gown, patient  with 4 point restrains, VSS, afebrile, stil has redness on the left hand,  Plan of Care Reviewed With: patient  Overall Patient Progress: no change  2/26/2025 0604 by Jamel Wilder RN  Outcome: Progressing  Flowsheets (Taken 2/26/2025 0604)  Outcome Evaluation: Patient is confused, alert to self, trying to remove his gown, patient  with 4 point restrains, VSS, afebrile, stil has redness on the left hand,  Plan of Care Reviewed With: patient  Overall Patient Progress: no change  Goal: Patient-Specific Goal (Individualized)  Description: You can add care plan individualizations to a care plan. Examples of Individualization might be:  \"Parent requests to be called daily at 9am for status\", \"I have a hard time hearing out of my right ear\", or \"Do not touch me to wake me up as it startles  me\".  Outcome: Progressing  Goal: Absence of Hospital-Acquired Illness or " Injury  Outcome: Progressing  Intervention: Identify and Manage Fall Risk  Recent Flowsheet Documentation  Taken 2/26/2025 0000 by Jamel Wilder RN  Safety Promotion/Fall Prevention:   activity supervised   assistive device/personal items within reach   bedside attendant   increase visualization of patient   increased rounding and observation   nonskid shoes/slippers when out of bed   patient and family education   safety round/check completed  Intervention: Prevent Skin Injury  Recent Flowsheet Documentation  Taken 2/26/2025 0400 by Jamel Wilder RN  Body Position: turned  Taken 2/26/2025 0200 by Jamel Wilder RN  Body Position:   turned   left  Taken 2/26/2025 0000 by Jamel Wilder RN  Body Position: position changed independently  Skin Protection:   pectin skin barriers applied   protective footwear used   skin sealant/moisture barrier applied   tubing/devices free from skin contact  Intervention: Prevent Infection  Recent Flowsheet Documentation  Taken 2/26/2025 0000 by Jamel Wilder RN  Infection Prevention:   cohorting utilized   environmental surveillance performed   equipment surfaces disinfected   hand hygiene promoted   personal protective equipment utilized   rest/sleep promoted   single patient room provided  Goal: Optimal Comfort and Wellbeing  Outcome: Progressing  Intervention: Provide Person-Centered Care  Recent Flowsheet Documentation  Taken 2/26/2025 0000 by Jamel Wilder RN  Trust Relationship/Rapport:   care explained   choices provided  Goal: Readiness for Transition of Care  Outcome: Progressing

## 2025-02-26 NOTE — CONSULTS
ORTHOPEDIC CONSULTATION    Consultation  Nestor Peterson,  1940, MRN [id number]    Cellulitis of left hand [L03.114]    PCP: Services, [facility], None   Code status:  No CPR- Do NOT Intubate       Extended Emergency Contact Information  Primary Emergency Contact: Francisco Javier Peterson ([place] GUARDIAN; MUST ACT JOINTLY)  Address: [address] [place] [address] [place]  Home Phone: [phone]  Mobile Phone: [phone]  Relation: Son  Secondary Emergency Contact: Deepali Vidales ([place] GUARDIAN; MUST ACT JOINTLY)  Address: [address] [place] [address] [place] [address] [place]  Home Phone: [phone]  Mobile Phone: [phone]  Relation: Daughter         IMPRESSION:  84-year-old male with left upper extremity swelling and erythema to the dorsal hand wrist and distal forearm.  Patient is been on empiric vancomycin and Zosyn since , switched to penicillin today.  Overall no signs concerning for septic arthritis of the wrist or any MCP joints, no palpable fluctuance indicative of abscess.  Swelling and erythema seems to be most focal over the third MCP joint, CT scan from  does indicate effusion of the third MCP joint and the radiocarpal joint.  Blood cultures negative.  White blood cells trending down.     PLAN:  This patient was discussed with Dr. Isaac, on-call surgeon for [facility] and they are in agreement with the following plan.   -No indication for urgent surgical intervention at this time  -CT scan demonstrates effusions likely related to pseudogout.  Patient would probably benefit from adding on an anti-inflammatory.  Systemic steroids versus NSAIDs, will defer to medicine on what would be best indicated  -Continue antibiotics per primary team  -Would recommend continued monitoring at this time.  Would hold off on a aspiration and will monitor for clinical improvement, could consider ultrasound and possible aspiration if no clinical improvement    Thank you for  including [facility] in the care of Nestor Peterson. It has been a pleasure participating in their care.        CHIEF COMPLAINT: <principal problem not specified>    HISTORY OF PRESENT ILLNESS:  The patient is seen in orthopedic consultation at the request of Mahi Tierney,* for left upper extremity swelling and erythema.  The patient is a 84 year old male    The patient presents today with swelling and erythema of the left upper extremity.  Patient was admitted to the hospital on 2/22/2025 with left upper extremity swelling and erythema that had been ongoing for few days.  He had been seen in an urgent care prior to admission and was having continued progression of his symptoms while on oral Keflex.  Since admission he has been on IV vancomycin and Zosyn.  Per hospitalist he does not seem to be having much improvement at this point and is switched to p.o. penicillin VK today.  Some leukocytosis yesterday trending down today.    I discussed symptoms with the patient who is a bit confused this morning, he is unable to tell me when his symptoms started or how the upper extremity is feeling today.  Denies fever/chills this morning.      ALLERGIES:   Review of patient's allergies indicates   Allergies   Allergen Reactions    Codeine Unknown         MEDICATIONS UPON ADMISSION:  Medications were reviewed.  They include:   Medications Prior to Admission   Medication Sig Dispense Refill Last Dose/Taking    acetaminophen (TYLENOL) 500 MG tablet Take 1,000 mg by mouth 2 times daily.   2/22/2025 at  8:00 AM    aspirin (ASA) 81 MG chewable tablet TAKE ONE TABLET BY MOUTH ONCE DAILY 14 tablet 10 2/22/2025 at  8:00 AM    cephALEXin (KEFLEX) 500 MG capsule Take 500 mg by mouth 3 times daily.   2/22/2025 at  8:00 AM    cetirizine (ZYRTEC) 10 MG tablet TAKE ONE TABLET BY MOUTH ONCE DAILY 14 tablet 10 2/22/2025 at  8:00 AM    donepezil (ARICEPT) 5 MG tablet TAKE 1 TABLET BY MOUTH EVERYDAY AT BEDTIME 90 tablet 2  "2/21/2025 Bedtime    finasteride (PROSCAR) 5 MG tablet TAKE 1 TABLET BY MOUTH EVERY DAY 90 tablet 2 2/22/2025 at  8:00 AM    FLUoxetine (PROZAC) 10 MG capsule Take 1 capsule (10 mg) by mouth daily 90 capsule 1 2/22/2025 at  8:00 AM    glipiZIDE (GLUCOTROL XL) 10 MG 24 hr tablet Take 10 mg by mouth daily.   2/22/2025 at  8:00 AM    metFORMIN (GLUCOPHAGE) 1000 MG tablet TAKE 1 TABLET BY MOUTH TWICE A DAY WITH MEALS 180 tablet 1 2/22/2025 at  8:00 AM    metoprolol succinate ER (TOPROL XL) 25 MG 24 hr tablet Take 1 tablet (25 mg) by mouth daily 90 tablet 3 2/22/2025 at  8:00 AM    Multiple Vitamins-Minerals (CERTAVITE SENIOR) TABS TAKE ONE TABLET BY MOUTH ONCE DAILY 14 tablet 10 2/22/2025 at  8:00 AM    pregabalin (LYRICA) 50 MG capsule Take 50 mg by mouth 2 times daily.   2/22/2025 at  8:00 AM    tamsulosin (FLOMAX) 0.4 MG capsule TAKE 1 CAPSULE BY MOUTH EVERY DAY 90 capsule 2 2/22/2025 at  8:00 AM    traZODone (DESYREL) 100 MG tablet Take 1 tablet (100 mg) by mouth at bedtime 90 tablet 0 2/21/2025 Bedtime    blood glucose (ACCU-CHEK DAISHA PLUS) test strip USE TO TEST BLOOD SUGAR 1 TIMES DAILY OR AS DIRECTED. 100 strip 2     Lancets MISC [LANCETS MISC] Use As Directed.            SOCIAL HISTORY:   he  reports that he has never smoked. He has never been exposed to tobacco smoke. He has never used smokeless tobacco. He reports that he does not currently use alcohol.      FAMILY HISTORY:  family history includes Diabetes in his brother; Hypertension in his son.      REVIEW OF SYSTEMS:   Reviewed with patient. See HPI, otherwise negative       PHYSICAL EXAMINATION:  Vitals: BP (!) 144/69 (BP Location: Right arm)   Pulse 69   Temp 98.1  F (36.7  C) (Oral)   Resp 20   Ht 1.778 m (5' 10\")   Wt 97.1 kg (214 lb 1.1 oz)   SpO2 94%   BMI 30.72 kg/m    General: On examination, the patient is resting comfortably, NAD, awake, and alert and oriented to person, place, time, and, and general circumstances   SKIN: There is dorsal " left hand wrist and distal forearm swelling and erythema.  Erythema is dark reddish purpleish  Pulses:  Brachial and radial pulse is intact and equal bilaterally  Sensation: intact and equal bilaterally to the distal upper extremities.  Tenderness: There is tenderness palpation of the radiocarpal joint and the third MCP joint.  No palpable fluctuance about the dorsal hand  ROM: Able to wiggle fingers appropriately, able to flex/extend all fingers and wrist.  Unable to make a full fist due to stiffness and pain in the middle finger.  Passively able to flex and extend all fingers at the MCP joints and the wrist without any pain elicited    Contralateral side= Full range of motion, Negative joint instability findings, 5/5 motor groups about the joint, Non-tender.       RADIOGRAPHIC EVALUATION:  Personally reviewed  EXAM: CT HAND LEFT W CONTRAST  LOCATION: [facility]  DATE: 2/22/2025     INDICATION: Left hand infection, concern for abscess, etc.  COMPARISON: None.  TECHNIQUE: IV contrast. Axial, sagittal and coronal thin-section reconstruction. Dose reduction techniques were used.   CONTRAST: 75 ml Isovue 370.     FINDINGS:     Images are degraded by patient motion artifact despite repeat sequence acquisition, somewhat limiting evaluation.     BONES:   -Negative for acute fracture within the hand or wrist. Scattered mild degenerative changes involving the thumb CMC, triscaphe, MCP and interphalangeal joints. There is chondrocalcinosis scattered within the wrist and involving the metacarpophalangeal   joints most pronounced at the third MCP joint.     -There are radiocarpal and distal radioulnar joint effusions. There is a third MCP joint effusion as well.     -No CT evidence for osteomyelitis.     SOFT TISSUES:  -There is diffuse soft tissue edema over the dorsum of the hand and wrist, without a drainable soft tissue collection. There is second, third and fourth extensor compartment  tenosynovitis, nonspecific.                                                                      IMPRESSION:  1.  Diffuse soft tissue edema over the dorsum of the hand and wrist is nonspecific but could be seen with cellulitis in the appropriate clinical setting. No drainable soft tissue collection.  2.  There is second, third and fourth extensor compartment tenosynovitis, also nonspecific.  3.  Scattered degenerative changes of the hand and wrist. However, there is considerable chondrocalcinosis and there are radiocarpal, distal radioulnar and third MCP joint effusions. Findings could be seen with calcium pyrophosphate deposition   arthropathy and clinical correlation for symptoms of pseudogout would be helpful. Infection could have a similar appearance and should be excluded clinically.  4.  No CT evidence for osteomyelitis.    PERTINENT LABS:  Personally reviewed  Recent Labs   Lab Test 02/26/25  0656   HGB 15.7            ESPERANZA COLE PA-C  Date: 2/26/2025  Time: 12:22 PM  [facility]    CC1:   Mahi Tierney,*    CC2:   Mavis Bermudez Physician

## 2025-02-26 NOTE — PLAN OF CARE
Goal Outcome Evaluation:    Alert to self.  Calm and cooperative today. Was able to remove his restraints.  Tolerating diet.  No complaints. Elevate the left hand.  Bed alarm and one to one for safety.
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
## 2025-02-26 NOTE — PROGRESS NOTES
Care Management Follow Up    Length of Stay (days): 4    Expected Discharge Date: 02/27/2025    Anticipated Discharge Plan:   return to memory care    Transportation: TBD    PT Recommendations:    OT Recommendations:        Barriers to Discharge: medical stability, on 1:1    Prior Living Situation: assisted living (memory care) with child(ralf), adult    Advanced Directive on File: present on chart     Patient/Spokesperson Updated: No    Additional Information:  Per MD, Pt may be medically ready to discharge tomorrow 2/27. Pt is currently on 1:1 and four-point restraints. ROSIE spoke with nursing staff at Pt's memory care [facility] who report they likely will be able to take Pt back as his behaviors are probably due to being in the hospital and not being able to walk around; he is typically not physically aggressive towards staff at [facility] but does get agitated and will yell. If Pt is ready tomorrow, [facility] care needs him back before 1pm tomorrow as nursing staff is leaving for the afternoon. If Pt discharges on Friday, they can take Pt back anytime before 5pm. [facility] care does not accept Pts back on the weekend or after 5pm due to not having nursing staff available.     ROSIE scheduled tentative stretcher ride for tomorrow 2/27 between 10:40-11:20AM in case Pt is ready tomorrow. ROSIE confirmed discharge plan and transportation with Pt's son/guardian Bassem and daughter/guardian Deepali. PAS completed.     Nurses' station at [facility]: [phone]    Next steps:  Coordinate discharge with Pt's [facility] care and confirm in AM if they can take Pt back. Coordinate with son and daughter (guardians).     Radha Hunt, BSW      Counseled patient on inpatient medications. Addressed all questions and concerns.  Patient has 2 PRN constipation medications. Recommended making one standing.

## 2025-02-27 VITALS
TEMPERATURE: 97.8 F | HEART RATE: 89 BPM | HEIGHT: 70 IN | DIASTOLIC BLOOD PRESSURE: 71 MMHG | WEIGHT: 214.07 LBS | BODY MASS INDEX: 30.65 KG/M2 | RESPIRATION RATE: 18 BRPM | SYSTOLIC BLOOD PRESSURE: 159 MMHG | OXYGEN SATURATION: 94 %

## 2025-02-27 LAB
ANION GAP SERPL CALCULATED.3IONS-SCNC: 14 MMOL/L (ref 7–15)
BACTERIA BLD CULT: NO GROWTH
BACTERIA BLD CULT: NO GROWTH
BUN SERPL-MCNC: 27.6 MG/DL (ref 8–23)
CALCIUM SERPL-MCNC: 9.5 MG/DL (ref 8.8–10.4)
CHLORIDE SERPL-SCNC: 100 MMOL/L (ref 98–107)
CREAT SERPL-MCNC: 1.53 MG/DL (ref 0.67–1.17)
EGFRCR SERPLBLD CKD-EPI 2021: 45 ML/MIN/1.73M2
ERYTHROCYTE [DISTWIDTH] IN BLOOD BY AUTOMATED COUNT: 13.2 % (ref 10–15)
GLUCOSE BLDC GLUCOMTR-MCNC: 175 MG/DL (ref 70–99)
GLUCOSE BLDC GLUCOMTR-MCNC: 176 MG/DL (ref 70–99)
GLUCOSE BLDC GLUCOMTR-MCNC: 264 MG/DL (ref 70–99)
GLUCOSE BLDC GLUCOMTR-MCNC: 273 MG/DL (ref 70–99)
GLUCOSE SERPL-MCNC: 161 MG/DL (ref 70–99)
HCO3 SERPL-SCNC: 23 MMOL/L (ref 22–29)
HCT VFR BLD AUTO: 46.3 % (ref 40–53)
HGB BLD-MCNC: 15.2 G/DL (ref 13.3–17.7)
MCH RBC QN AUTO: 31.3 PG (ref 26.5–33)
MCHC RBC AUTO-ENTMCNC: 32.8 G/DL (ref 31.5–36.5)
MCV RBC AUTO: 96 FL (ref 78–100)
PLATELET # BLD AUTO: 241 10E3/UL (ref 150–450)
POTASSIUM SERPL-SCNC: 4.1 MMOL/L (ref 3.4–5.3)
RBC # BLD AUTO: 4.85 10E6/UL (ref 4.4–5.9)
SODIUM SERPL-SCNC: 137 MMOL/L (ref 135–145)
WBC # BLD AUTO: 10 10E3/UL (ref 4–11)

## 2025-02-27 PROCEDURE — 36415 COLL VENOUS BLD VENIPUNCTURE: CPT

## 2025-02-27 PROCEDURE — 82374 ASSAY BLOOD CARBON DIOXIDE: CPT

## 2025-02-27 PROCEDURE — 250N000013 HC RX MED GY IP 250 OP 250 PS 637

## 2025-02-27 PROCEDURE — 85048 AUTOMATED LEUKOCYTE COUNT: CPT

## 2025-02-27 PROCEDURE — 80048 BASIC METABOLIC PNL TOTAL CA: CPT

## 2025-02-27 PROCEDURE — 250N000011 HC RX IP 250 OP 636

## 2025-02-27 PROCEDURE — 99232 SBSQ HOSP IP/OBS MODERATE 35: CPT | Mod: GC

## 2025-02-27 PROCEDURE — 258N000003 HC RX IP 258 OP 636

## 2025-02-27 PROCEDURE — 250N000013 HC RX MED GY IP 250 OP 250 PS 637: Performed by: FAMILY MEDICINE

## 2025-02-27 PROCEDURE — 85018 HEMOGLOBIN: CPT

## 2025-02-27 PROCEDURE — 120N000001 HC R&B MED SURG/OB

## 2025-02-27 RX ORDER — HYDROCORTISONE 25 MG/G
CREAM TOPICAL 2 TIMES DAILY
Status: DISCONTINUED | OUTPATIENT
Start: 2025-02-27 | End: 2025-02-27

## 2025-02-27 RX ORDER — OLANZAPINE 10 MG/2ML
5 INJECTION, POWDER, FOR SOLUTION INTRAMUSCULAR DAILY PRN
Status: ACTIVE | OUTPATIENT
Start: 2025-02-27

## 2025-02-27 RX ORDER — TRIAMCINOLONE ACETONIDE 1 MG/G
CREAM TOPICAL 2 TIMES DAILY
Status: DISPENSED | OUTPATIENT
Start: 2025-02-27

## 2025-02-27 RX ORDER — OLANZAPINE 10 MG/2ML
5 INJECTION, POWDER, FOR SOLUTION INTRAMUSCULAR DAILY PRN
Status: DISCONTINUED | OUTPATIENT
Start: 2025-02-27 | End: 2025-02-27

## 2025-02-27 RX ORDER — SODIUM CHLORIDE 9 MG/ML
INJECTION, SOLUTION INTRAVENOUS CONTINUOUS
Status: ACTIVE | OUTPATIENT
Start: 2025-02-27

## 2025-02-27 RX ORDER — PENICILLIN G 3000000 [IU]/50ML
3 INJECTION, SOLUTION INTRAVENOUS EVERY 6 HOURS
Status: DISPENSED | OUTPATIENT
Start: 2025-02-27

## 2025-02-27 RX ADMIN — PENICILLIN G 3 MILLION UNITS: 3000000 INJECTION, SOLUTION INTRAVENOUS at 11:57

## 2025-02-27 RX ADMIN — SODIUM CHLORIDE: 0.9 INJECTION, SOLUTION INTRAVENOUS at 21:29

## 2025-02-27 RX ADMIN — ACETAMINOPHEN 650 MG: 325 TABLET ORAL at 20:01

## 2025-02-27 RX ADMIN — PENICILLIN G 3 MILLION UNITS: 3000000 INJECTION, SOLUTION INTRAVENOUS at 22:37

## 2025-02-27 RX ADMIN — SODIUM CHLORIDE: 0.9 INJECTION, SOLUTION INTRAVENOUS at 10:06

## 2025-02-27 RX ADMIN — QUETIAPINE FUMARATE 100 MG: 100 TABLET ORAL at 20:01

## 2025-02-27 RX ADMIN — HYDROCORTISONE: 25 CREAM TOPICAL at 12:00

## 2025-02-27 RX ADMIN — PENICILLIN V POTASSIUM 500 MG: 250 TABLET, FILM COATED ORAL at 05:58

## 2025-02-27 RX ADMIN — OLANZAPINE 5 MG: 10 INJECTION, POWDER, FOR SOLUTION INTRAMUSCULAR at 09:57

## 2025-02-27 RX ADMIN — DONEPEZIL HYDROCHLORIDE 5 MG: 5 TABLET ORAL at 22:37

## 2025-02-27 RX ADMIN — PENICILLIN G 3 MILLION UNITS: 3000000 INJECTION, SOLUTION INTRAVENOUS at 17:37

## 2025-02-27 RX ADMIN — ENOXAPARIN SODIUM 40 MG: 40 INJECTION SUBCUTANEOUS at 17:27

## 2025-02-27 RX ADMIN — TRIAMCINOLONE ACETONIDE: 1 CREAM TOPICAL at 20:12

## 2025-02-27 RX ADMIN — PREGABALIN 50 MG: 50 CAPSULE ORAL at 20:01

## 2025-02-27 ASSESSMENT — ACTIVITIES OF DAILY LIVING (ADL)
ADLS_ACUITY_SCORE: 75

## 2025-02-27 NOTE — UTILIZATION REVIEW
Admission Status; Secondary Review Determination   INSURANCE DENIAL  Under the authority of the Utilization Management Committee, the utilization review process indicated a secondary review on the above patient. The review outcome is based on review of the medical records, discussions with staff, and applying clinical experience noted on the date of the review.   (x) Inpatient Status Appropriate - This patient's medical care is consistent with medical management for inpatient care and reasonable inpatient medical practice.   RATIONALE FOR DETERMINATION   83 yo male with progressive Lewy Body dementia who presents from memory care with concern over worsening cellulitis of the left hand/wrist despite several days of oupatient antibiotics.  Elevated lactic acid and CRP on presentation. Required several days of broad-spectrum abx (IV vanco/IV zosyn).  CT imaging with significant effusions of the third MCP joint and radiocarpal joint.  Ortho consulted and recommended NSAIDS (cannot use d/t CRISTINA) or STEROIDS (cannot use d/t acute agitated delerium).  Increased agitation requiring 4-point restraints, IM zyprexa in the last 24 hours.  Pt is currently refusing to take po meds and so is remaining on IV abx for treatment of cellulitis.  Creatinine worse on lab results today and is remaining on IVF.  At the time of admission with the information available to the attending physician more than 2 nights Hospital complex care was anticipated, based on patient risk of adverse outcome if treated as outpatient and complex care required. Inpatient admission is appropriate based on the Medicare guidelines.   The information on this document is developed by the utilization review team in order for the business office to ensure compliance. This only denotes the appropriateness of proper admission status and does not reflect the quality of care rendered.   The definitions of Inpatient Status and Observation Status used in making the  determination above are those provided in the CMS Coverage Manual, Chapter 1 and Chapter 6, section 70.4.     Sincerely,     Tiffanie Anand, DO  Utilization Review  Physician Advisor
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
## 2025-02-27 NOTE — PLAN OF CARE
Problem: Pain Acute  Goal: Optimal Pain Control and Function  Outcome: Progressing  Intervention: Optimize Psychosocial Wellbeing  Recent Flowsheet Documentation  Taken 2/27/2025 0930 by Eliazar Asencio RN  Supportive Measures: active listening utilized  Intervention: Prevent or Manage Pain  Recent Flowsheet Documentation  Taken 2/27/2025 0930 by Eliazar Asencio RN  Sensory Stimulation Regulation: care clustered     Problem: Infection  Goal: Absence of Infection Signs and Symptoms  Outcome: Progressing     Problem: Hypertension Acute  Goal: Blood Pressure Within Desired Range  Outcome: Progressing  Intervention: Normalize Blood Pressure  Recent Flowsheet Documentation  Taken 2/27/2025 0930 by Eliazar Asencio RN  Sensory Stimulation Regulation: care clustered     Problem: Restraint, Nonviolent  Goal: Absence of Harm or Injury  Outcome: Progressing  Intervention: Protect Dignity, Rights and Personal Wellbeing  Recent Flowsheet Documentation  Taken 2/27/2025 0930 by Eliazar Asencio RN  Trust Relationship/Rapport:   care explained   choices provided  Intervention: Protect Skin and Joint Integrity  Recent Flowsheet Documentation  Taken 2/27/2025 0930 by Eliazar Asencio RN  Body Position: position changed independently     Problem: Comorbidity Management  Goal: Maintenance of Behavioral Health Symptom Control  Outcome: Progressing     Problem: Seclusion/Restraint, Behavioral  Goal: Seclusion/Behavioral Restraint Goal: Absence of Harm or Injury  Outcome: Progressing  Intervention: Protect Dignity, Rights, and Personal Wellbeing  Recent Flowsheet Documentation  Taken 2/27/2025 0930 by Eliazar Asencio RN  Trust Relationship/Rapport:   care explained   choices provided  Intervention: Protect Skin and Joint Integrity  Recent Flowsheet Documentation  Taken 2/27/2025 0930 by Eliazar Asencio RN  Body Position: position changed independently   Goal Outcome Evaluation:       Confused pt alert but disoriented. Code JULY called at  beginning of shift, see other note for details. On room air. Hypertensive in the 150s. Vital signs now PRN only due to behavioral and sleep disturbance. On IV abx. NS infusing at 125 ml/hr. Denies pain. Calmer and cooperative after PRN IM zyprexa given. Restraints now discontinued. Voiding, BM x 1.

## 2025-02-27 NOTE — PLAN OF CARE
Problem: Pain Acute  Goal: Optimal Pain Control and Function  Outcome: Progressing  Intervention: Prevent or Manage Pain  Recent Flowsheet Documentation  Taken 2/26/2025 2307 by Haley Inman RN  Medication Review/Management: medications reviewed     Problem: Infection  Goal: Absence of Infection Signs and Symptoms  Outcome: Progressing     Problem: Delirium  Goal: Improved Behavioral Control  Outcome: Progressing  Intervention: Minimize Safety Risk  Recent Flowsheet Documentation  Taken 2/26/2025 2307 by Haley Inman RN  Enhanced Safety Measures:   pain management   patient/family teach back on injury risk  Goal: Improved Sleep  Outcome: Progressing     Problem: Hypertension Acute  Goal: Blood Pressure Within Desired Range  Outcome: Progressing  Intervention: Normalize Blood Pressure  Recent Flowsheet Documentation  Taken 2/26/2025 2307 by Haley Inman RN  Medication Review/Management: medications reviewed   Goal Outcome Evaluation: Pt alert and oriented to self. Makes his needs known. Confused and restless at time but easily  redirectable. Slept most of the shift.  & 175. Denies pain.  Ambulating to bathroom. Voiding without difficulty. On 1:1 for safety. VSS.

## 2025-02-27 NOTE — PROGRESS NOTES
[facility]    Progress Note - Hospitalist Service       Date of Admission:  2025    Assessment & Plan   Nestor Peterson is a 84 year old male with past medical history of lewy body dementia, T2DM, HTN, BPH admitted on 2025 with cellulitis of the left hand/wrist +/- possible pseudogout vs. Pyrophosphate, no evidence of osteomyelitis. Behavioral agitation/delirium in setting of dementia.     Updates :  -Ortho consult placed due to lack of interval improvement in his L hand infection              -No further intervention per Ortho, CT scan not revealing significant swelling or joint effusion, consider pseuodogout and recommended anti inflammatories and consider PO prednisone however with patient's dementia and delirium would not favor this approach   -CRISTINA Cr 1.53, hold off on NSAIDs for now  -Was on PO  mg QID , however now refusing PO meds, will have to transition to IV PCN G 3 mil units QID , interval improvement in L hand swelling, effusion, and pain  -Add topical hydrocortisone for rash LUE, unclear if brusing from restraints vs. Contact dermatitis vs. Sequelae of cellulitis  -Scheduled Seroquel 50 mg AM, 100 mg at bedtime, please avoid additional antipsychotics  Code JULY  AM, required 4 point violent restraints, refusing PO meds, had to give IM zyprexa x1 dose   -Per Memory Care director, pt needs to be off of restraints x24 hours before can return to facility  -Seroquel 50 mg AM, 100 mg PM scheduled   -Hold cetirizine   -Carb count 1:7, lantus 18 units, HDSSI    -Change vital checks to PRN, avoid additional interruptions in setting of dementia/delirium   -No AM labs , vitals checks PRN  -Let violent restraints , discussed with 1:1 sitter, ok to trial off restraints again , however if he escalates again to resume     Suspected cellulitis L Hand/Wrist  Reported treatment for cellulitis this past week at an outside urgent care with oral  cephalexin. Patient's son notes that over the last week, swelling, redness, and pain have slowly progressed. XR on admission negative for acute fracture or osteomyelitis. Unfortunately, MRI was non-diagnostic due to profound motion artifact due agitation of the patient. CT on admission demonstrating diffuse soft tissue edema over dorsum of L hand and wrist most consistent with cellulitis and no drainable soft tissue collection. Some concern for poss. Pseudogout vs. Intraarticular infection however no clear evidence of osteomyelitis. Overall vitals stable, labs remarkable for CRP of 16, lactic acid of 2.2. WBC normal.  No clear lesions, pustules, or draining abscess. L hand exam improving, repeat L  Unclear if rash vs. Ecchymosis vs. Cellulitis at this point. Developed new leukocytosis 2/25 WBC 14.0>improved 11.3 2/26, afebrile, overall infectious source appears to be improving. Bcx NGTD. Consider repeat CT L hand.  -Orthopedic consult placed, recommendations appreciated   -No further intervention, consider anti-inflammatories (holding NSAIDs due to CRISTINA), not a good candidate for PO prednisone  - Discontinue vanc and zosyn 2/26          -Was on PO  mg QID 2/26, however now refusing PO meds, will have to transition to IV PCN G 3 mil units QID 2/27, interval improvement in L hand swelling, effusion, and pain   -Could consider adding clindamycin if no clinical improvement  - scheduled tylenol for pain control  - NS @ 125mL/hr      T2DM  Blood sugar on admission 262, most recent A1c of 9.7 over 6 months ago. PTA medications include 10 mg glipizide, 2000mg metformin daily.  BG has been 150-220, the past 24 hours, not yet controlled but closer to goal  -Cont. lantus to 18 units daily  - repeat A1c 9.1, slightly improved from prior  -HDSSI  -1:7 carb count TID w/meals  -Consistent carb count diabetic diet     Lewy-body dementia  Delirium  Oriented to self only, has been persistently aggressive towards staff while  "admitted. Olanzapine relatively contraindicated in LBD.   Got ativan and haldol 2/22 due to agitation, and then required restraints due to aggressive behavior 2/23-2/24, also required PRN haldol, zyprexa overnight  Code JULY 2/27 AM, required 4 point violent restraints, refusing PO meds, had to give IM zyprexa x1 dose   -Per Memory Care director, pt needs to be off of restraints x24 hours before can return to facility  -Seroquel 50 mg AM, 100 mg PM scheduled  -Care management consulted for disposition planning  -Hold cetirizine     Chronic Kidney Disease, stage 3a  Acute Kidney Injury  Cr. Interval worsening 1.26>1.36>1.53 2/27, BUN 28, GFR 45, near 20:1 BUN:Cr ratio and poor PO intake makes pre-renal more likley, will resume mIVF today and recheck BMP AM  -Encourage PO fluid intake  -Continue to monitor daily BMP  -Discontinued vancomycin as may be contributory to CRISTINA on CKD     Hypertension  -'s on PTA regimen  -IV hydralazine available for SBP >180, favor this over labetalol given HR 50's (appropriate if he continues to refuse PO metoprolol)        Diet: Moderate Consistent Carb (60 g CHO per Meal) Diet    DVT Prophylaxis: Enoxaparin (Lovenox) SQ  Rowe Catheter: Not present  Fluids: NS @ 125 mL/hr  Lines: None     Cardiac Monitoring: None  Code Status: No CPR- Do NOT Intubate      Clinically Significant Risk Factors                   # Hypertension: Noted on problem list     # Dementia: noted on problem list       # DMII: A1C = 9.1 % (Ref range: <5.7 %) within past 6 months   # Obesity: Estimated body mass index is 30.72 kg/m  as calculated from the following:    Height as of this encounter: 1.778 m (5' 10\").    Weight as of this encounter: 97.1 kg (214 lb 1.1 oz).   # Moderate Malnutrition: based on nutrition assessment           Social Drivers of Health   Food Insecurity: Unknown (2/24/2025)    Food Insecurity     Within the past 12 months, did you worry that your food would run out before you got money " to buy more?: Patient unable to answer     Within the past 12 months, did the food you bought just not last and you didn t have money to get more?: Patient unable to answer   Housing Stability: Unknown (2/24/2025)    Housing Stability     Do you have housing? : Patient unable to answer     Are you worried about losing your housing?: Patient unable to answer   Financial Resource Strain: Unknown (2/24/2025)    Financial Resource Strain     Within the past 12 months, have you or your family members you live with been unable to get utilities (heat, electricity) when it was really needed?: Patient unable to answer   Transportation Needs: Unknown (2/24/2025)    Transportation Needs     Within the past 12 months, has lack of transportation kept you from medical appointments, getting your medicines, non-medical meetings or appointments, work, or from getting things that you need?: Patient unable to answer   Physical Activity: Inactive (4/8/2024)    Exercise Vital Sign     Days of Exercise per Week: 0 days     Minutes of Exercise per Session: 0 min   Interpersonal Safety: High Risk (2/23/2025)    Interpersonal Safety     Do you feel physically and emotionally safe where you currently live?: No     Within the past 12 months, have you been hit, slapped, kicked or otherwise physically hurt by someone?: No     Within the past 12 months, have you been humiliated or emotionally abused in other ways by your partner or ex-partner?: No   Stress: Stress Concern Present (4/8/2024)    [employer] [place] [employer] - Occupational Stress Questionnaire     Feeling of Stress : Rather much   Social Connections: Unknown (4/8/2024)    Social Connection and Isolation Panel [NHANES]     Frequency of Social Gatherings with Friends and Family: Never         Disposition Plan     Medically Ready for Discharge: Anticipated Tomorrow         The patient's care was discussed with the Attending Physician, Dr. Fernandez .    Andrea Booker,  MD  Hospitalist Service  [facility]  Securely message with [employer] (more info)  Text page via [email or web address] / [email or web address] Paging/Directory   ______________________________________________________________________    Interval History   Code JULY responded to this AM, patient did very well yesterday evening and through the night off restraints but was once again attempting to strike staff this morning. Replaced in 4 point violent restraints, 1x dose IM zyprexa given due to refusing PO meds. IV PCN today since refusing PO. Will start fluids for CRISTINA. Per memory care pt needs to be off of restraints x24 hours before returning, director coming to [facility] this morning.     Physical Exam   Vital Signs: Temp: 98.4  F (36.9  C) Temp src: Axillary BP: (!) 158/75 Pulse: 79   Resp: 18 SpO2: 95 % O2 Device: None (Room air)    Weight: 214 lbs 1.07 oz    GEN: Drowsy, moderately cooperative on exam. No acute distress. In 4 point violent restraints. Quickly falls back asleep during exam  HEENT: Normocephalic, atraumatic.  NECK: Supple. No cervical or supraclavicular adenopathy.   PULM: Non-labored breathing. No use of accessory muscles. Clear to ausculation bilaterally. No wheezes or crackles.   CV: Regular rate and rhythm. Normal S1, S2. No rubs, murmurs, or gallops.    ABDOMEN: Normoactive bowel sounds. Non-tender to palpation. Non-distended.    EXTREMITES:  Left hand with continued erythema and swelling.  No longer tender to touch over dorsum L hand. Almost appears like ecchymosis vs rash over cellulitis vs. Interval improvement. Warm and  tender to palpation. Does not extend past previously drawn margins on admission. Dressing/sleeve in place over distal LUE  NEURO:  Awake. Oriented to person only.   PSYCH: Dementia. Pleasant and cooperative during my exam    Data     I have personally reviewed the following data over the past 24 hrs:    10.0  \   15.2   / 241     137 100 27.6 (H) /  161 (H)   4.1 23 1.53 (H) \       Imaging  results reviewed over the past 24 hrs:   No results found for this or any previous visit (from the past 24 hours).    Andrea Booker MD  PGY-2  [facility] Residency  [facility]  February 27, 2025

## 2025-02-27 NOTE — PLAN OF CARE
Problem: Adult Inpatient Plan of Care  Goal: Absence of Hospital-Acquired Illness or Injury  Intervention: Identify and Manage Fall Risk  Recent Flowsheet Documentation  Taken 2/26/2025 1720 by Chasity Trammell, RN  Safety Promotion/Fall Prevention:   activity supervised   bedside attendant   clutter free environment maintained   lighting adjusted   mobility aid in reach   nonskid shoes/slippers when out of bed   patient and family education     Problem: Pain Acute  Goal: Optimal Pain Control and Function  Intervention: Prevent or Manage Pain  Recent Flowsheet Documentation  Taken 2/26/2025 1720 by Chasity Trammell, RN  Medication Review/Management: medications reviewed   Goal Outcome Evaluation:       Pt alert to self. Assist 1 to bathroom. Pt has been cooperative and calm this shift, restraints remain off. Family was visiting. No signs of pain. 1:1 sitter at bedside for safety.

## 2025-02-27 NOTE — PROGRESS NOTES
"Orthopedic Progress Note      Assessment:    84-year-old male with left upper extremity swelling and erythema to the dorsal hand wrist and distal forearm. Patient is been on empiric vancomycin and Zosyn since 2/22, switched to penicillin today. Overall no signs concerning for septic arthritis of the wrist or any MCP joints, no palpable fluctuance indicative of abscess. Swelling and erythema seems to be most focal over the third MCP joint, CT scan from 2/22 does indicate effusion of the third MCP joint and the radiocarpal joint. Blood cultures negative. White blood cells trending down.  Possibly contact dermatitis    Plan:   -No indication for urgent surgical intervention at this time  -Could benefit from systemic antiinflammatories. NSAIDs contraindicated due to CRISTINA  -Continue antibiotics per primary team  -Would recommend continued monitoring at this time.  Today the extent of erythema seems more suspicious for a contact dermatitis.  Topical hydrocortisone added.  Would hold off on a aspiration and will monitor for clinical improvement        Subjective:    Patient is drowsy and not alert during evaluation      Objective:  BP (!) 158/75 (BP Location: Left arm)   Pulse 79   Temp 98.4  F (36.9  C) (Axillary)   Resp 18   Ht 1.778 m (5' 10\")   Wt 97.1 kg (214 lb 1.1 oz)   SpO2 95%   BMI 30.72 kg/m        General: On examination, the patient is resting comfortably, not alert to situation.  He is in restraints this morning.  SKIN: There is dorsal left hand wrist and distal forearm swelling and erythema.  Erythema is dark reddish purpleish.  Today the erythema extends further approximately but seems to be more consistently following the outline of his compression sleeve from yesterday  Pulses:  Brachial and radial pulse is intact and equal bilaterally  Sensation: intact and equal bilaterally to the distal upper extremities.  Tenderness: Unable to pinpoint any tenderness, patient is not awake for participation in " "exam but does not demonstrate signs of pain with palpation around the MCP joints or radiocarpal joint.  There is palpable swelling over the dorsal hand, does not seem to be fluctuant or indicative of an abscess  ROM: Does not demonstrate range of motion        Pertinent Labs   Lab Results: personally reviewed.   No results found for: \"INR\", \"PROTIME\"  Lab Results   Component Value Date    WBC 10.0 02/27/2025    HGB 15.2 02/27/2025    HCT 46.3 02/27/2025    MCV 96 02/27/2025     02/27/2025     Lab Results   Component Value Date     02/27/2025    CO2 23 02/27/2025         Report completed by:  ESPERANZA COLE PA-C  Date: 02/27/2025  [facility]              "

## 2025-02-27 NOTE — PROGRESS NOTES
Care Management Follow Up    Length of Stay (days): 5    Expected Discharge Date: 02/28/2025    Anticipated Discharge Plan:   return to memory care    Transportation: Confirmed Stretcher. Transportation costs discussed? Yes. Discussed with son/guardian, Bassem on 2/26    PT Recommendations:    OT Recommendations:        Barriers to Discharge: medical stability, 1:1, restraints    Prior Living Situation: assisted living (memory care) with child(ralf), adult    Discussed  Partnership in Safe Discharge Planning  document with patient/family: No     Handoff Completed: No, handoff not indicated or clinically appropriate    Patient/Spokesperson Updated: Yes. Who? Son Bassem (via voice mail)    Additional Information:  8:15 AM  Reviewed chart. Patient is scheduled for discharge back to [facility] between [id number] via [facility] stretcher transport. PCS completed. Message sent to MD to provide update of early transport.     ROSIE called and left voice mail for Nurses' station at [facility]: [phone] requesting call back to confirm they are ok with patient returning this morning.     8:32 AM  Per MD, patient is not medically appropriate for discharge today. ROSIE called and left voice mail for patient's facility to provide update. Requested call back from director of nursing to seek clarification related to if patient needs to be out of restraints for a certain amount of time before returning.     ROSIE called and left voice mail for pt's son/guardian, Bassem, to provide update.     Transport tentatively rescheduled for tomorrow, 2/28 between [id number].    10:39 AM  ROSIE spoke with Cortney, clinical director of nursing at [facility]. She reports plan to visit patient around 1100. Discussed patient's need for intermittent restraints. Cortney reports patient will have to be off of restraints for 24 hours prior to discharge. Discussed 1:1; Cortney reports if 1:1 is for behavior, they will  also need him to be off for 24 hours, if needing 1:1 for wondering then does not have to be off before returning. Updated MD and bedside RN.     11:01 AM  Cortney is here visiting. Provided Cortney with progress notes to review from the last few days, per her request.     11:24 AM  Provided MAR to Cortney. She reiterates patient has to be off of restraints and no IM injections for 24 hours before discharge. If discharging on Friday, would need to return before 1700. Cortney confirms facility could admit patient over the weekend if needed.     Called and left voice mail for son/guardian to provide update related to information above.     Cortney, clinical director at [facility]: ph-[phone]    Next steps: coordinate discharge back to memory care when medically appropriate.       JANE Zacarias on 02/27/25

## 2025-02-28 VITALS
HEART RATE: 74 BPM | WEIGHT: 214.07 LBS | SYSTOLIC BLOOD PRESSURE: 139 MMHG | TEMPERATURE: 97.8 F | HEIGHT: 70 IN | BODY MASS INDEX: 30.65 KG/M2 | OXYGEN SATURATION: 92 % | DIASTOLIC BLOOD PRESSURE: 79 MMHG | RESPIRATION RATE: 18 BRPM

## 2025-02-28 LAB
CREAT SERPL-MCNC: 1.31 MG/DL (ref 0.67–1.17)
EGFRCR SERPLBLD CKD-EPI 2021: 54 ML/MIN/1.73M2
GLUCOSE BLDC GLUCOMTR-MCNC: 183 MG/DL (ref 70–99)
GLUCOSE BLDC GLUCOMTR-MCNC: 267 MG/DL (ref 70–99)
PLATELET # BLD AUTO: 203 10E3/UL (ref 150–450)

## 2025-02-28 PROCEDURE — 250N000013 HC RX MED GY IP 250 OP 250 PS 637

## 2025-02-28 PROCEDURE — 82565 ASSAY OF CREATININE: CPT | Performed by: STUDENT IN AN ORGANIZED HEALTH CARE EDUCATION/TRAINING PROGRAM

## 2025-02-28 PROCEDURE — 36415 COLL VENOUS BLD VENIPUNCTURE: CPT | Performed by: STUDENT IN AN ORGANIZED HEALTH CARE EDUCATION/TRAINING PROGRAM

## 2025-02-28 PROCEDURE — 250N000011 HC RX IP 250 OP 636

## 2025-02-28 PROCEDURE — 85049 AUTOMATED PLATELET COUNT: CPT | Performed by: STUDENT IN AN ORGANIZED HEALTH CARE EDUCATION/TRAINING PROGRAM

## 2025-02-28 PROCEDURE — 258N000003 HC RX IP 258 OP 636

## 2025-02-28 RX ORDER — TRIAMCINOLONE ACETONIDE 5 MG/G
CREAM TOPICAL 2 TIMES DAILY
Qty: 15 G | Refills: 0 | Status: SHIPPED | OUTPATIENT
Start: 2025-02-28 | End: 2025-02-28

## 2025-02-28 RX ORDER — QUETIAPINE FUMARATE 50 MG/1
50 TABLET, FILM COATED ORAL EVERY MORNING
Qty: 30 TABLET | Refills: 0 | Status: SHIPPED | OUTPATIENT
Start: 2025-03-01

## 2025-02-28 RX ORDER — PENICILLIN V POTASSIUM 250 MG/1
500 TABLET, FILM COATED ORAL EVERY 6 HOURS SCHEDULED
Status: DISCONTINUED | OUTPATIENT
Start: 2025-02-28 | End: 2025-02-28 | Stop reason: HOSPADM

## 2025-02-28 RX ORDER — PENICILLIN V POTASSIUM 500 MG/1
500 TABLET, FILM COATED ORAL EVERY 6 HOURS
Qty: 28 TABLET | Refills: 0 | Status: SHIPPED | OUTPATIENT
Start: 2025-02-28 | End: 2025-03-07

## 2025-02-28 RX ORDER — TRIAMCINOLONE ACETONIDE 1 MG/G
CREAM TOPICAL 2 TIMES DAILY
Qty: 15 G | Refills: 0 | Status: SHIPPED | OUTPATIENT
Start: 2025-02-28 | End: 2025-03-14

## 2025-02-28 RX ORDER — QUETIAPINE FUMARATE 100 MG/1
100 TABLET, FILM COATED ORAL AT BEDTIME
Qty: 30 TABLET | Refills: 0 | Status: SHIPPED | OUTPATIENT
Start: 2025-02-28

## 2025-02-28 RX ADMIN — ACETAMINOPHEN 650 MG: 325 TABLET ORAL at 13:19

## 2025-02-28 RX ADMIN — PREGABALIN 50 MG: 50 CAPSULE ORAL at 08:37

## 2025-02-28 RX ADMIN — QUETIAPINE 50 MG: 25 TABLET ORAL at 08:33

## 2025-02-28 RX ADMIN — TRIAMCINOLONE ACETONIDE: 1 CREAM TOPICAL at 08:41

## 2025-02-28 RX ADMIN — PENICILLIN G 3 MILLION UNITS: 3000000 INJECTION, SOLUTION INTRAVENOUS at 05:19

## 2025-02-28 RX ADMIN — PENICILLIN V POTASSIUM 500 MG: 250 TABLET, FILM COATED ORAL at 11:50

## 2025-02-28 RX ADMIN — TAMSULOSIN HYDROCHLORIDE 0.8 MG: 0.4 CAPSULE ORAL at 08:37

## 2025-02-28 RX ADMIN — FINASTERIDE 5 MG: 5 TABLET, FILM COATED ORAL at 08:36

## 2025-02-28 RX ADMIN — ASPIRIN 81 MG: 81 TABLET, CHEWABLE ORAL at 08:36

## 2025-02-28 RX ADMIN — METOPROLOL SUCCINATE 25 MG: 25 TABLET, EXTENDED RELEASE ORAL at 08:35

## 2025-02-28 RX ADMIN — SODIUM CHLORIDE: 0.9 INJECTION, SOLUTION INTRAVENOUS at 09:09

## 2025-02-28 RX ADMIN — ACETAMINOPHEN 650 MG: 325 TABLET ORAL at 08:34

## 2025-02-28 RX ADMIN — FLUOXETINE HYDROCHLORIDE 10 MG: 10 CAPSULE ORAL at 08:35

## 2025-02-28 ASSESSMENT — ACTIVITIES OF DAILY LIVING (ADL)
ADLS_ACUITY_SCORE: 75
ADLS_ACUITY_SCORE: 70
ADLS_ACUITY_SCORE: 70
ADLS_ACUITY_SCORE: 75
ADLS_ACUITY_SCORE: 75
ADLS_ACUITY_SCORE: 70
ADLS_ACUITY_SCORE: 75
ADLS_ACUITY_SCORE: 70
ADLS_ACUITY_SCORE: 75
ADLS_ACUITY_SCORE: 70

## 2025-02-28 NOTE — DISCHARGE SUMMARY
"[facility]  Discharge Summary - Medicine & Pediatrics       Date of Admission:  2/22/2025  Date of Discharge:  2/28/2025  Discharging Provider: Tena Baires MD   Discharge Service: Hospitalist Service    Discharge Diagnoses   Active Problems:    Cellulitis of left hand    Date Noted: 2/22/2025    Clinically Significant Risk Factors     # DMII: A1C = 9.1 % (Ref range: <5.7 %) within past 6 months    # Obesity: Estimated body mass index is 30.72 kg/m  as calculated from the following:    Height as of this encounter: 1.778 m (5' 10\").    Weight as of this encounter: 97.1 kg (214 lb 1.1 oz).    # Moderate Malnutrition: based on nutrition assessment      Follow-ups Needed After Discharge   Follow-up Appointments       Hospital Follow-up with Existing Primary Care Provider (PCP)      Please see details below         Schedule Primary Care visit within: 7 Days             Unresulted Labs Ordered in the Past 30 Days of this Admission       No orders found from 1/23/2025 to 2/23/2025.        These results will be followed up by N/A.    Discharge Disposition   Discharged to nursing home  Condition at discharge: Stable    Hospital Course   Nestor Peterson with past medical history of lewy body dementia, T2DM, HTN, BPH who was admitted on 2/22/2025 for cellulitis of left upper extremity.  The following problems were addressed during his hospitalization:    Left upper extremity cellulitis   Left upper extremity contact dermatitis  Reported treatment for cellulitis this past week at an outside urgent care with oral cephalexin. Patient's son notes that over the last week, swelling, redness, and pain have slowly progressed. XR on admission negative for acute fracture or osteomyelitis. Unfortunately, MRI was non-diagnostic due to profound motion artifact due agitation of the patient. CT on admission demonstrating diffuse soft tissue edema over dorsum of L hand and wrist most consistent with cellulitis and " no drainable soft tissue collection. Some concern for poss. Pseudogout vs. Intraarticular infection however no clear evidence of osteomyelitis.  Orthopedic surgery was consulted and followed patient throughout admission. Cellulitis improving on antibiotics. Developed contact dermatitis, presumed due to sleeve/restraints. Improving with topical steroids.   - Continue PO penicillin V QID x 7 more days   - Continue topical triamcinolone BID x 14 days   - May need to prolong course if not resolved     Lewy body dementia with behavioral disturbance  Hospital induced delirium, improving  History of Lewy body dementia.  Lives in memory care.  Experienced hospital induced delirium with aggressive behaviors towards staff.  This has now resolved, and patient has been peaceful for over 24 hours.  Increased PTA Seroquel with good response.  -Seroquel 50 mg a.m. and 100 mg p.m.  -Discontinued PTA cetirizine due to concern this is contributing to delirium    T2DM  PTA medications include glipizide 10 mg, metformin 2000 mg daily.  A1c 9.1%.  Hyperglycemia during admission requiring initiation of insulin.  -Lantus 18 units daily  -Stopped mealtime NovoLog at discharge  -Continue PTA metformin, glipizide    CKD 3a  CRISTINA, resolved  Prerenal CRISTINA now resolved after fluids.  Cr 1.3 on day of discharge.    Consultations This Hospital Stay   PHARMACY TO DOSE VANCO  PHARMACY TO DOSE VANCO  CARE MANAGEMENT / SOCIAL WORK IP CONSULT  ORTHOPEDIC SURGERY IP CONSULT    Code Status   No CPR- Do NOT Intubate       The patient was discussed with Dr. Niall Baires MD  [address] [address]  Phone: [phone]  Fax: [phone]  ______________________________________________________________________    Physical Exam   Vital Signs: Temp: 97.8  F (36.6  C) Temp src: Axillary BP: 139/79 Pulse: 74   Resp: 18 SpO2: 92 % O2 Device: None (Room air)    Weight: 214 lbs 1.07 oz  GEN:  Awake, pleasant, no distress.   HEENT: Normocephalic, atraumatic.  NECK: Supple. No cervical or supraclavicular adenopathy.   PULM: Non-labored breathing. No use of accessory muscles. Clear to ausculation bilaterally. No wheezes or crackles.   CV: Regular rate and rhythm. Normal S1, S2. No rubs, murmurs, or gallops.    ABDOMEN: Normoactive bowel sounds. Non-tender to palpation. Non-distended.    EXTREMITES:  Left hand with continued erythema and swelling and overlying petechial appearing rash, overall improving from prior.   NEURO:  Awake. Oriented to person only.   PSYCH: Dementia. Pleasant and cooperative during my exam      Primary Care Physician   [facility]    Discharge Orders      Reason for your hospital stay    You were hospitalized for cellulitis.     Activity    Your activity upon discharge: activity as tolerated     Diet    Follow this diet upon discharge: regular adult diet     Hospital Follow-up with Existing Primary Care Provider (PCP)    Please see details below            Significant Results and Procedures   Most Recent 3 CBC's:  Recent Labs   Lab Test 02/28/25  0713 02/27/25  0631 02/26/25  0656 02/25/25  0616   WBC  --  10.0 11.3* 14.0*   HGB  --  15.2 15.7 15.5   MCV  --  96 96 94    241 249 243  243     Most Recent 3 BMP's:  Recent Labs   Lab Test 02/28/25  0833 02/28/25  0713 02/27/25 2006 02/27/25  1650 02/27/25  1138 02/27/25  0631 02/26/25  0813 02/26/25  0656 02/25/25  0729 02/25/25  0617   NA  --   --   --   --   --  137  --  138  --  140   POTASSIUM  --   --   --   --   --  4.1  --  4.1  --  4.2   CHLORIDE  --   --   --   --   --  100  --  101  --  104   CO2  --   --   --   --   --  23  --  24  --  25   BUN  --   --   --   --   --  27.6*  --  21.0  --  20.3   CR  --  1.31*  --   --   --  1.53*  --  1.36*  --  1.26*   ANIONGAP  --   --   --   --   --  14  --  13  --  11   ELLE  --   --   --   --   --  9.5  --  9.6  --  9.8   *  --  264* 273*   < > 161*   < >  224*   < > 239*    < > = values in this interval not displayed.   ,   Results for orders placed or performed during the hospital encounter of 02/22/25   Radius/Ulna XR,  PA &LAT, left    Narrative    EXAM: XR FOREARM LEFT 2 VIEWS  LOCATION: [facility]  DATE: 2/22/2025    INDICATION: swelling, possible infection  COMPARISON: None.      Impression    IMPRESSION: No acute fracture or dislocation. Wrist chondrocalcinosis. Distal humerus lateral epicondylar chronic ossicle versus enthesophyte. Mild soft tissue swelling in the forearm.   XR Hand Left G/E 3 Views    Narrative    EXAM: XR HAND LEFT G/E 3 VIEWS  LOCATION: [facility]  DATE: 2/22/2025    INDICATION: swelling, possible infection  COMPARISON: None.      Impression    IMPRESSION:     No acute fracture or dislocation. Mild degenerative changes scattered throughout the thumb CMC, STT, MCP, and interphalangeal joints.   Wrist chondrocalcinosis. Soft tissue swelling at the dorsal aspect of the hand. No radiographic evidence of osteomyelitis.   MR Hand Left w/o Contrast    Narrative    MRI ATTEMPTED BUT FAILED    The exam is nondiagnostic, as the patient was unable to complete the examination. Patient should reschedule when clinically appropriate.     Markedly motion degraded and off-center T2 fat sat axial, coronal and coronal PD images show soft tissue swelling along the visualized wrist and hand.    NOTE: ABNORMAL REPORT    THE DICTATION ABOVE DESCRIBES AN ABNORMALITY FOR WHICH FOLLOW-UP IS NEEDED.      CT Hand Left w Contrast    Narrative    EXAM: CT HAND LEFT W CONTRAST  LOCATION: [facility]  DATE: 2/22/2025    INDICATION: Left hand infection, concern for abscess, etc.  COMPARISON: None.  TECHNIQUE: IV contrast. Axial, sagittal and coronal thin-section reconstruction. Dose reduction techniques were used.   CONTRAST: 75 ml Isovue 370.    FINDINGS:    Images are degraded by patient motion  artifact despite repeat sequence acquisition, somewhat limiting evaluation.    BONES:   -Negative for acute fracture within the hand or wrist. Scattered mild degenerative changes involving the thumb CMC, triscaphe, MCP and interphalangeal joints. There is chondrocalcinosis scattered within the wrist and involving the metacarpophalangeal   joints most pronounced at the third MCP joint.    -There are radiocarpal and distal radioulnar joint effusions. There is a third MCP joint effusion as well.    -No CT evidence for osteomyelitis.    SOFT TISSUES:  -There is diffuse soft tissue edema over the dorsum of the hand and wrist, without a drainable soft tissue collection. There is second, third and fourth extensor compartment tenosynovitis, nonspecific.      Impression    IMPRESSION:  1.  Diffuse soft tissue edema over the dorsum of the hand and wrist is nonspecific but could be seen with cellulitis in the appropriate clinical setting. No drainable soft tissue collection.  2.  There is second, third and fourth extensor compartment tenosynovitis, also nonspecific.  3.  Scattered degenerative changes of the hand and wrist. However, there is considerable chondrocalcinosis and there are radiocarpal, distal radioulnar and third MCP joint effusions. Findings could be seen with calcium pyrophosphate deposition   arthropathy and clinical correlation for symptoms of pseudogout would be helpful. Infection could have a similar appearance and should be excluded clinically.  4.  No CT evidence for osteomyelitis.           Discharge Medications   Current Discharge Medication List        START taking these medications    Details   insulin glargine (LANTUS PEN) 100 UNIT/ML pen Inject 18 Units subcutaneously every morning (before breakfast).  Qty: 15 mL, Refills: 0    Comments: If Lantus is not covered by insurance, may substitute Basaglar or Semglee or other insulin glargine product per insurance preference at same dose and frequency.     Associated Diagnoses: Type 2 diabetes mellitus with other specified complication, without long-term current use of insulin (H)      insulin pen needle (32G X 4 MM) 32G X 4 MM miscellaneous Use 1 pen needles daily or as directed.  Qty: 100 each, Refills: 0    Associated Diagnoses: Type 2 diabetes mellitus with other specified complication, without long-term current use of insulin (H)      penicillin V (VEETID) 500 MG tablet Take 1 tablet (500 mg) by mouth every 6 hours for 7 days.  Qty: 28 tablet, Refills: 0    Associated Diagnoses: Cellulitis of left hand      !! QUEtiapine (SEROQUEL) 100 MG tablet Take 1 tablet (100 mg) by mouth at bedtime.  Qty: 30 tablet, Refills: 0    Associated Diagnoses: Lewy body dementia with behavioral disturbance (H)      !! QUEtiapine (SEROQUEL) 50 MG tablet Take 1 tablet (50 mg) by mouth every morning.  Qty: 30 tablet, Refills: 0    Associated Diagnoses: Lewy body dementia with behavioral disturbance (H)      triamcinolone (KENALOG) 0.1 % external cream Apply topically 2 times daily for 14 days.  Qty: 15 g, Refills: 0    Associated Diagnoses: Irritant contact dermatitis, unspecified trigger       !! - Potential duplicate medications found. Please discuss with provider.        CONTINUE these medications which have NOT CHANGED    Details   acetaminophen (TYLENOL) 500 MG tablet Take 1,000 mg by mouth 2 times daily.      aspirin (ASA) 81 MG chewable tablet TAKE ONE TABLET BY MOUTH ONCE DAILY  Qty: 14 tablet, Refills: 10    Associated Diagnoses: Encounter for medication refill      cetirizine (ZYRTEC) 10 MG tablet TAKE ONE TABLET BY MOUTH ONCE DAILY  Qty: 14 tablet, Refills: 10    Associated Diagnoses: Encounter for medication refill      donepezil (ARICEPT) 5 MG tablet TAKE 1 TABLET BY MOUTH EVERYDAY AT BEDTIME  Qty: 90 tablet, Refills: 2    Associated Diagnoses: Encounter for medication refill; Lewy body dementia (H)      finasteride (PROSCAR) 5 MG tablet TAKE 1 TABLET BY MOUTH EVERY  DAY  Qty: 90 tablet, Refills: 2    Associated Diagnoses: Encounter for medication refill      FLUoxetine (PROZAC) 10 MG capsule Take 1 capsule (10 mg) by mouth daily  Qty: 90 capsule, Refills: 1    Associated Diagnoses: Moderate episode of recurrent major depressive disorder (H)      glipiZIDE (GLUCOTROL XL) 10 MG 24 hr tablet Take 10 mg by mouth daily.      metFORMIN (GLUCOPHAGE) 1000 MG tablet TAKE 1 TABLET BY MOUTH TWICE A DAY WITH MEALS  Qty: 180 tablet, Refills: 1    Associated Diagnoses: Encounter for medication refill; DM2 (diabetes mellitus, type 2) (H)      metoprolol succinate ER (TOPROL XL) 25 MG 24 hr tablet Take 1 tablet (25 mg) by mouth daily  Qty: 90 tablet, Refills: 3    Associated Diagnoses: Encounter for medication refill      Multiple Vitamins-Minerals (CERTAVITE SENIOR) TABS TAKE ONE TABLET BY MOUTH ONCE DAILY  Qty: 14 tablet, Refills: 10    Associated Diagnoses: Encounter for medication refill      pregabalin (LYRICA) 50 MG capsule Take 50 mg by mouth 2 times daily.      tamsulosin (FLOMAX) 0.4 MG capsule TAKE 1 CAPSULE BY MOUTH EVERY DAY  Qty: 90 capsule, Refills: 2    Comments: DX Code Needed  .  Associated Diagnoses: BPH (benign prostatic hyperplasia); Encounter for medication refill      traZODone (DESYREL) 100 MG tablet Take 1 tablet (100 mg) by mouth at bedtime  Qty: 90 tablet, Refills: 0    Associated Diagnoses: Insomnia      blood glucose (ACCU-CHEK DAISHA PLUS) test strip USE TO TEST BLOOD SUGAR 1 TIMES DAILY OR AS DIRECTED.  Qty: 100 strip, Refills: 2    Comments: DX Code Needed  PLEASE SEND NEW RX. OUT OF REFILLS & NEEDS ASAP. THANK YOU.  Associated Diagnoses: Type 2 diabetes mellitus (H); Encounter for medication refill      Lancets MISC [LANCETS MISC] Use As Directed.           STOP taking these medications       cephALEXin (KEFLEX) 500 MG capsule Comments:   Reason for Stopping:             Allergies   Allergies   Allergen Reactions    Codeine Unknown

## 2025-02-28 NOTE — PROGRESS NOTES
Care Management Discharge Note    Discharge Date: 02/28/2025       Discharge Disposition: Assisted Living    Discharge Services:  memory care    Discharge DME: None    Discharge Transportation:      Private pay costs discussed: transportation costs    Does the patient's insurance plan have a 3 day qualifying hospital stay waiver?  Yes     Which insurance plan 3 day waiver is available? Alternative insurance waiver    Will the waiver be used for post-acute placement? No    PAS Confirmation Code: NA  Patient/family educated on Medicare website which has current facility and service quality ratings: no    Education Provided on the Discharge Plan: Yes  Persons Notified of Discharge Plans: son/guardian, Bassem  Patient/Family in Agreement with the Plan: yes    Handoff Referral Completed: No, handoff not indicated or clinically appropriate    Additional Information:  8:38 AM  ROSIE reviewed chart. Called and spoke with Cortney, clinical director at [facility]: [phone] provided update related to patient's care. ROSIE faxed MAR and progress notes to Cortney per her request at [phone].     9:17 AM  ROSIE spoke with Cortney, they have concerns about why patient needs to remain on 1:1. Discussed with nursing staff and updated Cortney, she plans to speak with her .     11:10 AM  After further discussion between care team and Cortney; plan to remove 1:1, ROSIE will update facility at 1300 on how patient is doing. If patient ok off of 1:1 by 1300, ok to proceed with planned discharge this afternoon. ROSIE met with patient's son, Bassem, who was present at bedside. Provided udpate related to discharge plan. He is understanding of plan and agreeable to patient returning back to facility this afternoon.     1:04 PM  Discussed updates with MD and RN; patient has been appropriate off of 1:1. ROSIE called HAYDEE Banks at [facility] (per direction from Cortney) ph: [phone]. Left voice mail for  Jocelyn providing update and plan for discharge. ROSIE faxed orders to Jocelyn at [phone].     Transport scheduled for today, 2/28 between 2444-0536 for discharge.     JANE Zacarias on 02/28/25

## 2025-02-28 NOTE — PROGRESS NOTES
1:1 sitter removed. Patient has been very pleasant this morning. Slept well overnight. No aggressive behaviors toward self or staff.     Plan:   - Care manager will discuss with memory care facility at 1pm. If patient's behaviors remain stable without 1:1, will discharge this afternoon to memory care.     Discussed with memory care staff member Cortney, bedside RN Bushra, and CM Cecile who are in agreement with plan.     Tena Baires MD, PGY-3  [facility]

## 2025-02-28 NOTE — PLAN OF CARE
Problem: Adult Inpatient Plan of Care  Goal: Absence of Hospital-Acquired Illness or Injury  Intervention: Identify and Manage Fall Risk  Recent Flowsheet Documentation  Taken 2/27/2025 1600 by Chasity Trammell RN  Safety Promotion/Fall Prevention:   activity supervised   bedside attendant     Problem: Adult Inpatient Plan of Care  Goal: Absence of Hospital-Acquired Illness or Injury  Intervention: Identify and Manage Fall Risk  Recent Flowsheet Documentation  Taken 2/27/2025 1600 by Chasity Trammell RN  Safety Promotion/Fall Prevention:   activity supervised   bedside attendant     Problem: Adult Inpatient Plan of Care  Goal: Optimal Comfort and Wellbeing  Outcome: Progressing  Intervention: Provide Person-Centered Care  Recent Flowsheet Documentation  Taken 2/27/2025 1600 by Chasity Trammell RN  Trust Relationship/Rapport:   care explained   choices provided   Goal Outcome Evaluation:       Pt alert to self, up to bathroom with assist 1. Pt has been cooperative this evening. Restraints remain off. Family was visiting at bedside. Had a good appetite. No signs of pain. NS running @125mL/hr. 1:1 sitter at bedside for safety. Chasity Trammell, RN

## 2025-02-28 NOTE — PROGRESS NOTES
[facility]    Progress Note - Hospitalist Service       Date of Admission:  2/22/2025    Assessment & Plan   Nestor Peterson is a 84 year old male with past medical history of lewy body dementia, T2DM, HTN, BPH admitted on 2/22/2025 with cellulitis of the left hand/wrist +/- possible pseudogout vs. Pyrophosphate, no evidence of osteomyelitis. Behavioral agitation/delirium in setting of dementia, now resolved.     Updates 2/28:  -Transition to PO penicillin as patient is tolerating PO intake  -Continue topical steroid for contact dermatitis   -CRISTINA resolved  -Behavior very pleasant this morning, slept well overnight   -Removed restraints at 1300 on 2/27  -Removed 1:1 sitter at 1100 on 2/28  -Ortho signed off due to improvement  -hopeful for discharge to [facility] this afternoon, awaiting approval from [facility], has 1400 ride, discharge orders in     Suspected cellulitis L Hand/Wrist  Reported treatment for cellulitis this past week at an outside urgent care with oral cephalexin. Patient's son notes that over the last week, swelling, redness, and pain have slowly progressed. XR on admission negative for acute fracture or osteomyelitis. Unfortunately, MRI was non-diagnostic due to profound motion artifact due agitation of the patient. CT on admission demonstrating diffuse soft tissue edema over dorsum of L hand and wrist most consistent with cellulitis and no drainable soft tissue collection. Some concern for poss. Pseudogout vs. Intraarticular infection however no clear evidence of osteomyelitis. Overall vitals stable, labs remarkable for CRP of 16, lactic acid of 2.2. WBC normal.  No clear lesions, pustules, or draining abscess. L hand exam improving, repeat L  Unclear if rash vs. Ecchymosis vs. Cellulitis at this point. Developed new leukocytosis 2/25 WBC 14.0>improved 11.3 2/26, afebrile, overall infectious source appears to be improving. Bcx NGTD. Consider repeat CT L  hand.  -Orthopedic consult placed, recommendations appreciated, now signed off   -No further intervention, consider anti-inflammatories (holding NSAIDs due to CRISTINA), not a good candidate for PO prednisone  - Discontinue vanc and zosyn 2/26  -Transition to PO penicillin  - scheduled tylenol for pain control  -stopped IVF     T2DM  Blood sugar on admission 262, most recent A1c of 9.7 over 6 months ago. PTA medications include 10 mg glipizide, 2000mg metformin daily.  BG has been 150-220, the past 24 hours, not yet controlled but closer to goal  -Cont. lantus to 18 units daily  - repeat A1c 9.1, slightly improved from prior  -HDSSI  -1:7 carb count TID w/meals  -Consistent carb count diabetic diet     Lewy-body dementia  Delirium  Oriented to self only, has been persistently aggressive towards staff while admitted. Olanzapine relatively contraindicated in LBD.   Got ativan and haldol 2/22 due to agitation, and then required restraints due to aggressive behavior 2/23-2/24, also required PRN haldol, zyprexa overnight  -Behavior very pleasant this morning, slept well overnight   -Removed restraints at 1300 on 2/27  -Removed 1:1 sitter at 1100 on 2/28  -Seroquel 50 mg AM, 100 mg PM scheduled  -Care management consulted for disposition planning  -Hold cetirizine     Chronic Kidney Disease, stage 3a  Acute Kidney Injury  Cr. Interval worsening 1.26>1.36>1.53 2/27, BUN 28, GFR 45, near 20:1 BUN:Cr ratio and poor PO intake makes pre-renal more likley, will resume mIVF today and recheck BMP AM  -Encourage PO fluid intake  -Continue to monitor daily BMP  -Discontinued vancomycin as may be contributory to CRISTINA on CKD     Hypertension  -'s on PTA regimen  -IV hydralazine available for SBP >180, favor this over labetalol given HR 50's (appropriate if he continues to refuse PO metoprolol)        Diet: Moderate Consistent Carb (60 g CHO per Meal) Diet  Diet    DVT Prophylaxis: Enoxaparin (Lovenox) SQ  Rowe Catheter: Not  "present  Fluids: NS @ 125 mL/hr  Lines: None     Cardiac Monitoring: None  Code Status: No CPR- Do NOT Intubate      Clinically Significant Risk Factors                   # Hypertension: Noted on problem list     # Dementia: noted on problem list       # DMII: A1C = 9.1 % (Ref range: <5.7 %) within past 6 months   # Obesity: Estimated body mass index is 30.72 kg/m  as calculated from the following:    Height as of this encounter: 1.778 m (5' 10\").    Weight as of this encounter: 97.1 kg (214 lb 1.1 oz).   # Moderate Malnutrition: based on nutrition assessment           Social Drivers of Health   Food Insecurity: Unknown (2/24/2025)    Food Insecurity     Within the past 12 months, did you worry that your food would run out before you got money to buy more?: Patient unable to answer     Within the past 12 months, did the food you bought just not last and you didn t have money to get more?: Patient unable to answer   Housing Stability: Unknown (2/24/2025)    Housing Stability     Do you have housing? : Patient unable to answer     Are you worried about losing your housing?: Patient unable to answer   Financial Resource Strain: Unknown (2/24/2025)    Financial Resource Strain     Within the past 12 months, have you or your family members you live with been unable to get utilities (heat, electricity) when it was really needed?: Patient unable to answer   Transportation Needs: Unknown (2/24/2025)    Transportation Needs     Within the past 12 months, has lack of transportation kept you from medical appointments, getting your medicines, non-medical meetings or appointments, work, or from getting things that you need?: Patient unable to answer   Physical Activity: Inactive (4/8/2024)    Exercise Vital Sign     Days of Exercise per Week: 0 days     Minutes of Exercise per Session: 0 min   Interpersonal Safety: High Risk (2/23/2025)    Interpersonal Safety     Do you feel physically and emotionally safe where you currently " live?: No     Within the past 12 months, have you been hit, slapped, kicked or otherwise physically hurt by someone?: No     Within the past 12 months, have you been humiliated or emotionally abused in other ways by your partner or ex-partner?: No   Stress: Stress Concern Present (4/8/2024)    Citizen of [place] [employer] - Occupational Stress Questionnaire     Feeling of Stress : Rather much   Social Connections: Unknown (4/8/2024)    Social Connection and Isolation Panel [NHANES]     Frequency of Social Gatherings with Friends and Family: Never         Disposition Plan     Medically Ready for Discharge: Anticipated Tomorrow         The patient's care was discussed with the Attending Physician, Dr. Fernandez .    Tena Baires MD  Hospitalist Service  [facility]  Securely message with [employer] (more info)  Text page via [facility] Paging/Directory   ______________________________________________________________________    Interval History   Very pleasant this morning, saying please/thank you to nursing staff. Slept well overnight. Arm improving.     Physical Exam   Vital Signs: Temp: 97.8  F (36.6  C) Temp src: Axillary BP: 139/79 Pulse: 74   Resp: 18 SpO2: 92 % O2 Device: None (Room air)    Weight: 214 lbs 1.07 oz    GEN: Awake, pleasant, no distress.   HEENT: Normocephalic, atraumatic.  NECK: Supple. No cervical or supraclavicular adenopathy.   PULM: Non-labored breathing. No use of accessory muscles. Clear to ausculation bilaterally. No wheezes or crackles.   CV: Regular rate and rhythm. Normal S1, S2. No rubs, murmurs, or gallops.    ABDOMEN: Normoactive bowel sounds. Non-tender to palpation. Non-distended.    EXTREMITES:  Left hand with continued erythema and swelling and overlying petechial appearing rash, overall improving from prior.   NEURO:  Awake. Oriented to person only.   PSYCH: Dementia. Pleasant and cooperative during my exam    Data     I have personally reviewed the  following data over the past 24 hrs:    N/A  \   N/A   / 203     N/A N/A N/A /  183 (H)   N/A N/A 1.31 (H) \       Imaging results reviewed over the past 24 hrs:   No results found for this or any previous visit (from the past 24 hours).    Tena Baires MD, PGY-3  [facility]

## 2025-02-28 NOTE — PLAN OF CARE
Goal Outcome Evaluation:                      Pt has remained off 1:1 since 11am. Discharging back to his memory care. Discharge packet sent with pt. Pt belongings returned and sent with pt. [facility] to transport.

## 2025-02-28 NOTE — PROGRESS NOTES
"Orthopedic Progress Note      Assessment:    84-year-old male with left upper extremity swelling and erythema to the dorsal hand wrist and distal forearm. Patient is been on empiric vancomycin and Zosyn since 2/22, switched to penicillin today. Overall no signs concerning for septic arthritis of the wrist or any MCP joints, no palpable fluctuance indicative of abscess. Swelling and erythema seems to be most focal over the third MCP joint, CT scan from 2/22 does indicate effusion of the third MCP joint and the radiocarpal joint. Blood cultures negative. White blood cells trending down.  Possibly contact dermatitis    Plan:   -No indication for urgent surgical intervention at this time  -Could benefit from systemic antiinflammatories. NSAIDs contraindicated due to CRISTINA  -Continue antibiotics per primary team  -Erythema seems consistent consistent with a contact dermatitis.  Would continue topical triamcinolone.  Will sign off for now, please let us know if there are further concerns over the weekend.        Subjective:    Patient is more alert during my evaluation today.  He is able to flex/extend his wrist with pain only assisted at extremes of range of motion.  Denies fever/chills.  He does not note much pain in his left wrist while at rest.      Objective:  /79 (BP Location: Right arm)   Pulse 74   Temp 97.8  F (36.6  C) (Axillary)   Resp 18   Ht 1.778 m (5' 10\")   Wt 97.1 kg (214 lb 1.1 oz)   SpO2 92%   BMI 30.72 kg/m        General: On examination, the patient is resting comfortably, not alert to situation.  He is in restraints this morning.  SKIN: There is dorsal left hand wrist and distal forearm swelling and erythema.  Erythema is dark reddish purpleish.  Today the erythema extends further approximately but seems to be more consistently following the outline of his compression sleeve  Pulses:  Brachial and radial pulse is intact and equal bilaterally  Sensation: intact and equal bilaterally to the " "distal upper extremities.  Tenderness: Tender to palpation over the radiocarpal joint.  No palpable fluctuance throughout the dorsal hand or wrist area.  He has generalized dorsal hand tenderness  ROM: Able to passively flex/extend the wrist and MCP joints, pain only in the wrist joint at extremes of range of motion.        Pertinent Labs   Lab Results: personally reviewed.   No results found for: \"INR\", \"PROTIME\"  Lab Results   Component Value Date    WBC 10.0 02/27/2025    HGB 15.2 02/27/2025    HCT 46.3 02/27/2025    MCV 96 02/27/2025     02/28/2025     Lab Results   Component Value Date     02/27/2025    CO2 23 02/27/2025         Report completed by:  ESPERANZA COLE PA-C  Date: 02/28/2025  [facility]              "

## 2025-02-28 NOTE — PLAN OF CARE
Problem: Adult Inpatient Plan of Care  Goal: Optimal Comfort and Wellbeing  Outcome: Progressing  Intervention: Provide Person-Centered Care  Recent Flowsheet Documentation  Taken 2/27/2025 2350 by Yobani Workman RN  Trust Relationship/Rapport:   care explained   choices provided     Problem: Pain Acute  Goal: Optimal Pain Control and Function  Outcome: Progressing  Intervention: Optimize Psychosocial Wellbeing  Recent Flowsheet Documentation  Taken 2/27/2025 2350 by Yobani Workman RN  Diversional Activities: television  Intervention: Prevent or Manage Pain  Recent Flowsheet Documentation  Taken 2/27/2025 2350 by Yobani Workman RN  Sensory Stimulation Regulation:   music on   television on   care clustered  Medication Review/Management: medications reviewed     Problem: Delirium  Goal: Improved Sleep  Outcome: Progressing     Problem: Hypertension Acute  Goal: Blood Pressure Within Desired Range  Outcome: Progressing  Intervention: Normalize Blood Pressure  Recent Flowsheet Documentation  Taken 2/27/2025 2350 by Yobani Workman RN  Sensory Stimulation Regulation:   music on   television on   care clustered  Medication Review/Management: medications reviewed     Problem: Seclusion/Restraint, Behavioral  Goal: Seclusion/Behavioral Restraint Goal: Absence of Harm or Injury  Outcome: Progressing  Intervention: Protect Dignity, Rights, and Personal Wellbeing  Recent Flowsheet Documentation  Taken 2/27/2025 2350 by Yobani Workman RN  Trust Relationship/Rapport:   care explained   choices provided  Intervention: Protect Skin and Joint Integrity  Recent Flowsheet Documentation  Taken 2/27/2025 2350 by Yobani Workman RN  Body Position: position changed independently   Goal Outcome Evaluation:       No signs of pain, pt slept most of night. Pt was cooperative and remained off restraints. NS running at 125ml/hr. 1:1 at bedside for safety.

## 2025-04-11 ENCOUNTER — TELEPHONE (OUTPATIENT)
Dept: FAMILY MEDICINE | Facility: CLINIC | Age: 85
End: 2025-04-11

## 2025-04-11 NOTE — TELEPHONE ENCOUNTER
Patient Quality Outreach    Patient is due for the following:   Diabetes -  A1C, Eye Exam, Microalbumin, and Foot Exam  Hypertension -  BP check  Physical Annual Wellness Visit    Action(s) Taken:   Schedule a office visit for DM and HTN follow up after 5/22/25 otherwise can schedule an Annual Wellness Visit (again after 5/22/25) and address it all at once and do split billing.     Type of outreach:    Sent [employer] message.    Questions for provider review:    None         Kady Grant MA  Chart routed to None.

## 2025-04-11 NOTE — LETTER
July 3, 2025          Nestor BRADEN Peterson  [address] [address]        Hello,     Your care team at [facility] values your health and well-being. After reviewing your chart, we have identified recommendation(s) to help you better manage your health.    It's time for your Annual Wellness visit. During your visit, we'll discuss your health, well-being, and any questions you may have related to preventive care. We'll also review any recommended tests, exams, or screenings you might need. To schedule please call your clinic [phone] or use your [employer] account.     If you recently had or are having any of these services soon, please contact the clinic via phone or [employer] so that your care team can update your records.    We look forward to seeing you at your upcoming visit.    If you have any questions or concerns, please contact our clinic. Thank you for continuing to trust us with your care.        Sincerely,        Your [facility] Care Team

## 2025-05-18 ENCOUNTER — HEALTH MAINTENANCE LETTER (OUTPATIENT)
Age: 85
End: 2025-05-18

## 2025-05-29 NOTE — TELEPHONE ENCOUNTER
Patient Quality Outreach    Patient is due for the following:   Diabetes -  Eye Exam, Microalbumin, and Foot Exam  Depression  -  PHQ-9 needed  Physical Annual Wellness Visit    Action(s) Taken:   Schedule a Annual Wellness Visit    Type of outreach:    Sent [employer] message.    Questions for provider review:    None         Kady Grant, [facility]  Chart routed to None.

## 2025-07-03 NOTE — TELEPHONE ENCOUNTER
Patient Quality Outreach    Patient is due for the following:   Diabetes -  A1C, Eye Exam, Microalbumin, and Foot Exam A1C due AFTER 8/22/25  Depression  -  PHQ-9 needed  Physical Annual Wellness Visit    Action(s) Taken:   No follow up needed at this time.    Type of outreach:    Sent letter.    Questions for provider review:    None         Kady Grant [facility]  Chart routed to None.

## 2025-08-06 ENCOUNTER — TELEPHONE (OUTPATIENT)
Dept: FAMILY MEDICINE | Facility: CLINIC | Age: 85
End: 2025-08-06
Payer: COMMERCIAL

## 2025-08-31 ENCOUNTER — HEALTH MAINTENANCE LETTER (OUTPATIENT)
Age: 85
End: 2025-08-31